# Patient Record
Sex: FEMALE | Race: WHITE | NOT HISPANIC OR LATINO | Employment: UNEMPLOYED | URBAN - METROPOLITAN AREA
[De-identification: names, ages, dates, MRNs, and addresses within clinical notes are randomized per-mention and may not be internally consistent; named-entity substitution may affect disease eponyms.]

---

## 2017-05-24 ENCOUNTER — HISTORICAL (OUTPATIENT)
Dept: ADMINISTRATIVE | Facility: HOSPITAL | Age: 77
End: 2017-05-24

## 2017-05-24 LAB
ABS NEUT (OLG): 5.69 X10(3)/MCL (ref 2.1–9.2)
ALBUMIN SERPL-MCNC: 4.6 GM/DL (ref 3.4–5)
ALBUMIN/GLOB SERPL: 1 RATIO (ref 1–2)
ALP SERPL-CCNC: 55 UNIT/L (ref 20–120)
ALT SERPL-CCNC: 21 UNIT/L
APPEARANCE, UA: CLEAR
AST SERPL-CCNC: 33 UNIT/L
BACTERIA #/AREA URNS AUTO: ABNORMAL /[HPF]
BASOPHILS # BLD AUTO: 0.06 X10(3)/MCL
BASOPHILS NFR BLD AUTO: 1 % (ref 0–1)
BILIRUB SERPL-MCNC: 0.8 MG/DL
BILIRUB UR QL STRIP: NEGATIVE
BILIRUBIN DIRECT+TOT PNL SERPL-MCNC: 0.2 MG/DL
BILIRUBIN DIRECT+TOT PNL SERPL-MCNC: 0.6 MG/DL
BUN SERPL-MCNC: 17 MG/DL (ref 7–25)
CALCIUM SERPL-MCNC: 10 MG/DL (ref 8.4–10.3)
CHLORIDE SERPL-SCNC: 102 MMOL/L (ref 96–110)
CHOLEST SERPL-MCNC: 232 MG/DL
CHOLEST/HDLC SERPL: 4.8 {RATIO} (ref 0–4.4)
CO2 SERPL-SCNC: 30 MMOL/L (ref 24–32)
COLOR UR: YELLOW
CREAT SERPL-MCNC: 0.72 MG/DL (ref 0.7–1.1)
EOSINOPHIL # BLD AUTO: 0.31 X10(3)/MCL
EOSINOPHIL NFR BLD AUTO: 3 % (ref 0–5)
ERYTHROCYTE [DISTWIDTH] IN BLOOD BY AUTOMATED COUNT: 12.8 % (ref 11.5–14.5)
GLOBULIN SER-MCNC: 3.3 GM/ML (ref 2.3–3.5)
GLUCOSE (UA): NORMAL
GLUCOSE SERPL-MCNC: 104 MG/DL (ref 65–99)
HCT VFR BLD AUTO: 45.3 % (ref 35–46)
HDLC SERPL-MCNC: 48 MG/DL
HGB BLD-MCNC: 15.4 GM/DL (ref 12–16)
HGB UR QL STRIP: NEGATIVE
HYALINE CASTS #/AREA URNS LPF: ABNORMAL /[LPF]
IMM GRANULOCYTES # BLD AUTO: 0.06 10*3/UL
IMM GRANULOCYTES NFR BLD AUTO: 1 %
KETONES UR QL STRIP: NEGATIVE
LDLC SERPL CALC-MCNC: 158 MG/DL (ref 0–130)
LEUKOCYTE ESTERASE UR QL STRIP: 250 LEU/UL
LYMPHOCYTES # BLD AUTO: 3.74 X10(3)/MCL
LYMPHOCYTES NFR BLD AUTO: 35 % (ref 15–40)
MCH RBC QN AUTO: 32.1 PG (ref 26–34)
MCHC RBC AUTO-ENTMCNC: 34 GM/DL (ref 31–37)
MCV RBC AUTO: 94.4 FL (ref 80–100)
MONOCYTES # BLD AUTO: 0.81 X10(3)/MCL
MONOCYTES NFR BLD AUTO: 8 % (ref 4–12)
NEUTROPHILS # BLD AUTO: 5.69 X10(3)/MCL
NEUTROPHILS NFR BLD AUTO: 53 X10(3)/MCL
NITRITE UR QL STRIP: NEGATIVE
PH UR STRIP: 6.5 [PH] (ref 4.5–8)
PLATELET # BLD AUTO: 238 X10(3)/MCL (ref 130–400)
PMV BLD AUTO: 12.5 FL (ref 7.4–10.4)
POTASSIUM SERPL-SCNC: 3.8 MMOL/L (ref 3.6–5.2)
PROT SERPL-MCNC: 7.9 GM/DL (ref 6–8)
PROT UR QL STRIP: 10 MG/DL
RBC # BLD AUTO: 4.8 X10(6)/MCL (ref 4–5.2)
RBC #/AREA URNS AUTO: ABNORMAL /[HPF]
SODIUM SERPL-SCNC: 139 MMOL/L (ref 135–146)
SP GR UR STRIP: 1.02 (ref 1–1.03)
SQUAMOUS #/AREA URNS LPF: ABNORMAL /[LPF]
TRIGL SERPL-MCNC: 131 MG/DL
UROBILINOGEN UR STRIP-ACNC: NORMAL
VLDLC SERPL CALC-MCNC: 26 MG/DL
WBC # SPEC AUTO: 10.7 X10(3)/MCL (ref 4.5–11)
WBC #/AREA URNS AUTO: ABNORMAL /HPF

## 2017-12-06 ENCOUNTER — HISTORICAL (OUTPATIENT)
Dept: ADMINISTRATIVE | Facility: HOSPITAL | Age: 77
End: 2017-12-06

## 2018-01-02 ENCOUNTER — HISTORICAL (OUTPATIENT)
Dept: ADMINISTRATIVE | Facility: HOSPITAL | Age: 78
End: 2018-01-02

## 2018-03-27 ENCOUNTER — HISTORICAL (OUTPATIENT)
Dept: ADMINISTRATIVE | Facility: HOSPITAL | Age: 78
End: 2018-03-27

## 2018-03-27 LAB
ALBUMIN SERPL-MCNC: 4.2 GM/DL (ref 3.4–5)
ALBUMIN/GLOB SERPL: 1 RATIO (ref 1–2)
ALP SERPL-CCNC: 53 UNIT/L (ref 45–117)
ALT SERPL-CCNC: 24 UNIT/L (ref 12–78)
AST SERPL-CCNC: 22 UNIT/L (ref 15–37)
BILIRUB SERPL-MCNC: 0.4 MG/DL (ref 0.2–1)
BILIRUBIN DIRECT+TOT PNL SERPL-MCNC: 0.1 MG/DL
BILIRUBIN DIRECT+TOT PNL SERPL-MCNC: 0.3 MG/DL
BUN SERPL-MCNC: 17 MG/DL (ref 7–18)
CALCIUM SERPL-MCNC: 10 MG/DL (ref 8.5–10.1)
CHLORIDE SERPL-SCNC: 107 MMOL/L (ref 98–107)
CHOLEST SERPL-MCNC: 203 MG/DL
CHOLEST/HDLC SERPL: 4.4 {RATIO} (ref 0–4.4)
CK SERPL-CCNC: 85 UNIT/L (ref 26–192)
CO2 SERPL-SCNC: 28 MMOL/L (ref 21–32)
CREAT SERPL-MCNC: 0.7 MG/DL (ref 0.6–1.3)
GLOBULIN SER-MCNC: 3.7 GM/ML (ref 2.3–3.5)
GLUCOSE SERPL-MCNC: 107 MG/DL (ref 74–106)
HDLC SERPL-MCNC: 46 MG/DL
LDLC SERPL CALC-MCNC: 117 MG/DL (ref 0–130)
POTASSIUM SERPL-SCNC: 4.2 MMOL/L (ref 3.5–5.1)
PROT SERPL-MCNC: 7.9 GM/DL (ref 6.4–8.2)
SODIUM SERPL-SCNC: 141 MMOL/L (ref 136–145)
TRIGL SERPL-MCNC: 198 MG/DL
VLDLC SERPL CALC-MCNC: 40 MG/DL

## 2018-09-25 ENCOUNTER — HISTORICAL (OUTPATIENT)
Dept: ADMINISTRATIVE | Facility: HOSPITAL | Age: 78
End: 2018-09-25

## 2018-09-25 LAB
ABS NEUT (OLG): 5.05 X10(3)/MCL (ref 2.1–9.2)
BASOPHILS # BLD AUTO: 0.05 X10(3)/MCL
BASOPHILS NFR BLD AUTO: 0 %
BUN SERPL-MCNC: 13 MG/DL (ref 7–18)
CALCIUM SERPL-MCNC: 10.4 MG/DL (ref 8.5–10.1)
CHLORIDE SERPL-SCNC: 102 MMOL/L (ref 98–107)
CO2 SERPL-SCNC: 29 MMOL/L (ref 21–32)
CREAT SERPL-MCNC: 0.7 MG/DL (ref 0.6–1.3)
CREAT/UREA NIT SERPL: 19
EOSINOPHIL # BLD AUTO: 0.31 X10(3)/MCL
EOSINOPHIL NFR BLD AUTO: 3 %
ERYTHROCYTE [DISTWIDTH] IN BLOOD BY AUTOMATED COUNT: 12.8 % (ref 11.5–14.5)
GLUCOSE SERPL-MCNC: 90 MG/DL (ref 74–106)
HCT VFR BLD AUTO: 42.1 % (ref 35–46)
HGB BLD-MCNC: 14.4 GM/DL (ref 12–16)
IMM GRANULOCYTES # BLD AUTO: 0.04 10*3/UL
IMM GRANULOCYTES NFR BLD AUTO: 0 %
LYMPHOCYTES # BLD AUTO: 3.73 X10(3)/MCL
LYMPHOCYTES NFR BLD AUTO: 38 % (ref 13–40)
MCH RBC QN AUTO: 32.9 PG (ref 26–34)
MCHC RBC AUTO-ENTMCNC: 34.2 GM/DL (ref 31–37)
MCV RBC AUTO: 96.1 FL (ref 80–100)
MONOCYTES # BLD AUTO: 0.73 X10(3)/MCL
MONOCYTES NFR BLD AUTO: 7 % (ref 4–12)
NEUTROPHILS # BLD AUTO: 5.05 X10(3)/MCL
NEUTROPHILS NFR BLD AUTO: 51 X10(3)/MCL
PLATELET # BLD AUTO: 180 X10(3)/MCL (ref 130–400)
PMV BLD AUTO: 13.2 FL (ref 7.4–10.4)
POTASSIUM SERPL-SCNC: 3.9 MMOL/L (ref 3.5–5.1)
RBC # BLD AUTO: 4.38 X10(6)/MCL (ref 4–5.2)
SODIUM SERPL-SCNC: 139 MMOL/L (ref 136–145)
WBC # SPEC AUTO: 9.9 X10(3)/MCL (ref 4.5–11)

## 2018-09-27 ENCOUNTER — HISTORICAL (OUTPATIENT)
Dept: SURGERY | Facility: HOSPITAL | Age: 78
End: 2018-09-27

## 2018-09-27 LAB — POTASSIUM SERPL-SCNC: 4.2 MMOL/L (ref 3.5–5.1)

## 2020-01-02 ENCOUNTER — HISTORICAL (OUTPATIENT)
Dept: INTERNAL MEDICINE | Facility: CLINIC | Age: 80
End: 2020-01-02

## 2020-01-02 LAB
ABS NEUT (OLG): 4.65 X10(3)/MCL (ref 2.1–9.2)
BASOPHILS # BLD AUTO: 0 X10(3)/MCL (ref 0–0.2)
BASOPHILS NFR BLD AUTO: 0 %
BUN SERPL-MCNC: 20 MG/DL (ref 7–18)
CALCIUM SERPL-MCNC: 9.5 MG/DL (ref 8.5–10.1)
CHLORIDE SERPL-SCNC: 109 MMOL/L (ref 98–107)
CHOLEST SERPL-MCNC: 169 MG/DL
CHOLEST/HDLC SERPL: 3.5 {RATIO} (ref 0–4.4)
CO2 SERPL-SCNC: 29 MMOL/L (ref 21–32)
CREAT SERPL-MCNC: 0.8 MG/DL (ref 0.6–1.3)
CREAT UR-MCNC: 180 MG/DL
CREAT/UREA NIT SERPL: 25
DEPRECATED CALCIDIOL+CALCIFEROL SERPL-MC: 59.28 NG/ML (ref 30–80)
EOSINOPHIL # BLD AUTO: 0.4 X10(3)/MCL (ref 0–0.9)
EOSINOPHIL NFR BLD AUTO: 4 %
ERYTHROCYTE [DISTWIDTH] IN BLOOD BY AUTOMATED COUNT: 13.1 % (ref 11.5–14.5)
EST. AVERAGE GLUCOSE BLD GHB EST-MCNC: 117 MG/DL
GLUCOSE SERPL-MCNC: 94 MG/DL (ref 74–106)
HBA1C MFR BLD: 5.7 % (ref 4.2–6.3)
HCT VFR BLD AUTO: 41.7 % (ref 35–46)
HDLC SERPL-MCNC: 48 MG/DL (ref 40–59)
HGB BLD-MCNC: 13.8 GM/DL (ref 12–16)
IMM GRANULOCYTES # BLD AUTO: 0.04 10*3/UL
IMM GRANULOCYTES NFR BLD AUTO: 0 %
LDLC SERPL CALC-MCNC: 94 MG/DL
LYMPHOCYTES # BLD AUTO: 2.6 X10(3)/MCL (ref 0.6–4.6)
LYMPHOCYTES NFR BLD AUTO: 31 %
MCH RBC QN AUTO: 32.9 PG (ref 26–34)
MCHC RBC AUTO-ENTMCNC: 33.1 GM/DL (ref 31–37)
MCV RBC AUTO: 99.5 FL (ref 80–100)
MICROALBUMIN UR-MCNC: 21.8 MG/L (ref 0–19)
MICROALBUMIN/CREAT RATIO PNL UR: 12.1 MCG/MG CR (ref 0–29)
MONOCYTES # BLD AUTO: 0.7 X10(3)/MCL (ref 0.1–1.3)
MONOCYTES NFR BLD AUTO: 8 %
NEUTROPHILS # BLD AUTO: 4.65 X10(3)/MCL (ref 2.1–9.2)
NEUTROPHILS NFR BLD AUTO: 55 %
PLATELET # BLD AUTO: 204 X10(3)/MCL (ref 130–400)
PMV BLD AUTO: 12.9 FL (ref 7.4–10.4)
POTASSIUM SERPL-SCNC: 3.8 MMOL/L (ref 3.5–5.1)
RBC # BLD AUTO: 4.19 X10(6)/MCL (ref 4–5.2)
SODIUM SERPL-SCNC: 139 MMOL/L (ref 136–145)
TRIGL SERPL-MCNC: 137 MG/DL
TSH SERPL-ACNC: 1.67 MIU/L (ref 0.36–3.74)
VLDLC SERPL CALC-MCNC: 27 MG/DL
WBC # SPEC AUTO: 8.4 X10(3)/MCL (ref 4.5–11)

## 2020-02-27 ENCOUNTER — HISTORICAL (OUTPATIENT)
Dept: RADIOLOGY | Facility: HOSPITAL | Age: 80
End: 2020-02-27

## 2021-03-04 ENCOUNTER — HISTORICAL (OUTPATIENT)
Dept: ADMINISTRATIVE | Facility: HOSPITAL | Age: 81
End: 2021-03-04

## 2021-03-04 ENCOUNTER — HISTORICAL (OUTPATIENT)
Dept: INTERNAL MEDICINE | Facility: CLINIC | Age: 81
End: 2021-03-04

## 2021-03-04 LAB
ABS NEUT (OLG): 5.07 X10(3)/MCL (ref 2.1–9.2)
ALBUMIN SERPL-MCNC: 4.2 GM/DL (ref 3.4–4.8)
ALBUMIN/GLOB SERPL: 1.4 RATIO (ref 1.1–2)
ALP SERPL-CCNC: 71 UNIT/L (ref 40–150)
ALT SERPL-CCNC: 19 UNIT/L (ref 0–55)
APPEARANCE, UA: CLEAR
AST SERPL-CCNC: 25 UNIT/L (ref 5–34)
BACTERIA #/AREA URNS AUTO: ABNORMAL /HPF
BASOPHILS # BLD AUTO: 0 X10(3)/MCL (ref 0–0.2)
BASOPHILS NFR BLD AUTO: 0 %
BILIRUB SERPL-MCNC: 0.4 MG/DL
BILIRUB UR QL STRIP: NEGATIVE
BILIRUBIN DIRECT+TOT PNL SERPL-MCNC: 0.2 MG/DL (ref 0–0.5)
BILIRUBIN DIRECT+TOT PNL SERPL-MCNC: 0.2 MG/DL (ref 0–0.8)
BUN SERPL-MCNC: 17.4 MG/DL (ref 9.8–20.1)
CALCIUM SERPL-MCNC: 10.7 MG/DL (ref 8.4–10.2)
CHLORIDE SERPL-SCNC: 101 MMOL/L (ref 98–107)
CHOLEST SERPL-MCNC: 200 MG/DL
CHOLEST/HDLC SERPL: 4 {RATIO} (ref 0–5)
CO2 SERPL-SCNC: 28 MMOL/L (ref 23–31)
COLOR UR: ABNORMAL
CREAT SERPL-MCNC: 0.78 MG/DL (ref 0.55–1.02)
DEPRECATED CALCIDIOL+CALCIFEROL SERPL-MC: 65 NG/ML (ref 30–80)
EOSINOPHIL # BLD AUTO: 0.3 X10(3)/MCL (ref 0–0.9)
EOSINOPHIL NFR BLD AUTO: 3 %
ERYTHROCYTE [DISTWIDTH] IN BLOOD BY AUTOMATED COUNT: 13 % (ref 11.5–14.5)
GLOBULIN SER-MCNC: 2.9 GM/DL (ref 2.4–3.5)
GLUCOSE (UA): NEGATIVE
GLUCOSE SERPL-MCNC: 93 MG/DL (ref 82–115)
HAV IGM SERPL QL IA: NONREACTIVE
HBV CORE IGM SERPL QL IA: NONREACTIVE
HBV SURFACE AG SERPL QL IA: NONREACTIVE
HCT VFR BLD AUTO: 39.6 % (ref 35–46)
HCV AB SERPL QL IA: NONREACTIVE
HDLC SERPL-MCNC: 51 MG/DL (ref 35–60)
HGB BLD-MCNC: 13.1 GM/DL (ref 12–16)
HGB UR QL STRIP: NEGATIVE
HIV 1+2 AB+HIV1 P24 AG SERPL QL IA: NONREACTIVE
HYALINE CASTS #/AREA URNS LPF: ABNORMAL /LPF
IMM GRANULOCYTES # BLD AUTO: 0.05 10*3/UL
IMM GRANULOCYTES NFR BLD AUTO: 0 %
KETONES UR QL STRIP: NEGATIVE
LDLC SERPL CALC-MCNC: 115 MG/DL (ref 50–140)
LEUKOCYTE ESTERASE UR QL STRIP: 75 LEU/UL
LYMPHOCYTES # BLD AUTO: 2.9 X10(3)/MCL (ref 0.6–4.6)
LYMPHOCYTES NFR BLD AUTO: 32 %
MCH RBC QN AUTO: 32.8 PG (ref 26–34)
MCHC RBC AUTO-ENTMCNC: 33.1 GM/DL (ref 31–37)
MCV RBC AUTO: 99.2 FL (ref 80–100)
MONOCYTES # BLD AUTO: 0.8 X10(3)/MCL (ref 0.1–1.3)
MONOCYTES NFR BLD AUTO: 9 %
NEUTROPHILS # BLD AUTO: 5.07 X10(3)/MCL (ref 2.1–9.2)
NEUTROPHILS NFR BLD AUTO: 55 %
NITRITE UR QL STRIP: NEGATIVE
PH UR STRIP: 6.5 [PH] (ref 4.5–8)
PLATELET # BLD AUTO: 202 X10(3)/MCL (ref 130–400)
PMV BLD AUTO: 13.4 FL (ref 7.4–10.4)
POTASSIUM SERPL-SCNC: 4.4 MMOL/L (ref 3.5–5.1)
PROT SERPL-MCNC: 7.1 GM/DL (ref 5.8–7.6)
PROT UR QL STRIP: NEGATIVE
RBC # BLD AUTO: 3.99 X10(6)/MCL (ref 4–5.2)
RBC #/AREA URNS AUTO: ABNORMAL /HPF
SODIUM SERPL-SCNC: 143 MMOL/L (ref 136–145)
SP GR UR STRIP: 1.01 (ref 1–1.03)
SQUAMOUS #/AREA URNS LPF: ABNORMAL /LPF
TRIGL SERPL-MCNC: 169 MG/DL (ref 37–140)
UROBILINOGEN UR STRIP-ACNC: NORMAL
VLDLC SERPL CALC-MCNC: 34 MG/DL
WBC # SPEC AUTO: 9.2 X10(3)/MCL (ref 4.5–11)
WBC #/AREA URNS AUTO: ABNORMAL /HPF

## 2021-07-22 ENCOUNTER — HISTORICAL (OUTPATIENT)
Dept: ADMINISTRATIVE | Facility: HOSPITAL | Age: 81
End: 2021-07-22

## 2021-07-22 LAB
ALBUMIN SERPL-MCNC: 4.6 GM/DL (ref 3.4–4.8)
ALBUMIN/GLOB SERPL: 1.5 RATIO (ref 1.1–2)
ALP SERPL-CCNC: 65 UNIT/L (ref 40–150)
ALT SERPL-CCNC: 18 UNIT/L (ref 0–55)
ANTINUCLEAR ANTIBODY SCREEN (OHS): NEGATIVE
AST SERPL-CCNC: 25 UNIT/L (ref 5–34)
BILIRUB SERPL-MCNC: 0.8 MG/DL
BILIRUBIN DIRECT+TOT PNL SERPL-MCNC: 0.3 MG/DL (ref 0–0.5)
BILIRUBIN DIRECT+TOT PNL SERPL-MCNC: 0.5 MG/DL (ref 0–0.8)
BUN SERPL-MCNC: 19.3 MG/DL (ref 9.8–20.1)
CALCIUM SERPL-MCNC: 11.6 MG/DL (ref 8.4–10.2)
CHLORIDE SERPL-SCNC: 103 MMOL/L (ref 98–107)
CO2 SERPL-SCNC: 28 MMOL/L (ref 23–31)
CREAT SERPL-MCNC: 0.81 MG/DL (ref 0.55–1.02)
CRP SERPL-MCNC: 0.36 MG/DL
DEPRECATED CALCIDIOL+CALCIFEROL SERPL-MC: 61.8 NG/ML (ref 30–80)
DSDNA ANTIBODY (OHS): NEGATIVE
ERYTHROCYTE [SEDIMENTATION RATE] IN BLOOD: 13 MM/HR (ref 0–20)
EST CREAT CLEARANCE SER (OHS): 47.39 ML/MIN
EST. AVERAGE GLUCOSE BLD GHB EST-MCNC: 108.3 MG/DL
FOLATE SERPL-MCNC: 18.6 NG/ML (ref 7–31.4)
GLOBULIN SER-MCNC: 3 GM/DL (ref 2.4–3.5)
GLUCOSE SERPL-MCNC: 97 MG/DL (ref 82–115)
HBA1C MFR BLD: 5.4 %
POTASSIUM SERPL-SCNC: 3.9 MMOL/L (ref 3.5–5.1)
PROT SERPL-MCNC: 7.6 GM/DL (ref 5.8–7.6)
PTH-INTACT SERPL-MCNC: 131.8 PG/ML (ref 8.7–77)
SODIUM SERPL-SCNC: 140 MMOL/L (ref 136–145)
T PALLIDUM AB SER QL: NONREACTIVE
VIT B12 SERPL-MCNC: 914 PG/ML (ref 213–816)

## 2021-08-16 ENCOUNTER — HISTORICAL (OUTPATIENT)
Dept: RADIOLOGY | Facility: HOSPITAL | Age: 81
End: 2021-08-16

## 2021-10-20 ENCOUNTER — HISTORICAL (OUTPATIENT)
Dept: INTERNAL MEDICINE | Facility: CLINIC | Age: 81
End: 2021-10-20

## 2021-10-20 LAB
BUN SERPL-MCNC: 14 MG/DL (ref 9.8–20.1)
CALCIUM SERPL-MCNC: 10.4 MG/DL (ref 8.4–10.2)
CHLORIDE SERPL-SCNC: 111 MMOL/L (ref 98–107)
CO2 SERPL-SCNC: 22 MMOL/L (ref 23–31)
CREAT SERPL-MCNC: 0.74 MG/DL (ref 0.55–1.02)
CREAT UR-MCNC: 188.8 MG/DL (ref 45–106)
CREAT/UREA NIT SERPL: 19
DEPRECATED CALCIDIOL+CALCIFEROL SERPL-MC: 58.4 NG/ML (ref 30–80)
GLUCOSE SERPL-MCNC: 98 MG/DL (ref 82–115)
MICROALBUMIN UR-MCNC: 22.5 MG/L
MICROALBUMIN/CREAT RATIO PNL UR: 11.9 MG/GM CR (ref 0–30)
POTASSIUM SERPL-SCNC: 3.9 MMOL/L (ref 3.5–5.1)
PTH-INTACT SERPL-MCNC: 107.4 PG/ML (ref 8.7–77)
SODIUM SERPL-SCNC: 143 MMOL/L (ref 136–145)
T3FREE SERPL-MCNC: 2.67 PG/ML (ref 1.71–3.71)
T4 FREE SERPL-MCNC: 0.92 NG/DL (ref 0.7–1.48)
TSH SERPL-ACNC: 0.72 UIU/ML (ref 0.35–4.94)

## 2022-02-24 ENCOUNTER — HISTORICAL (OUTPATIENT)
Dept: RADIOLOGY | Facility: HOSPITAL | Age: 82
End: 2022-02-24

## 2022-02-24 ENCOUNTER — HISTORICAL (OUTPATIENT)
Dept: ADMINISTRATIVE | Facility: HOSPITAL | Age: 82
End: 2022-02-24

## 2022-04-10 ENCOUNTER — HISTORICAL (OUTPATIENT)
Dept: ADMINISTRATIVE | Facility: HOSPITAL | Age: 82
End: 2022-04-10
Payer: MEDICARE

## 2022-04-26 VITALS
BODY MASS INDEX: 21.71 KG/M2 | DIASTOLIC BLOOD PRESSURE: 76 MMHG | WEIGHT: 127.19 LBS | SYSTOLIC BLOOD PRESSURE: 125 MMHG | HEIGHT: 64 IN

## 2022-04-30 NOTE — PROGRESS NOTES
Patient:   Guillermina Stewart             MRN: [id number]            FIN: [id number]               Age:   81 years     Sex:  Female     :  1940   Associated Diagnoses:   None   Author:   Cruz Almendarez MD      Reviewed Ms. Stewart's lab results and thyroid ultrasound. Informed her of the TIRAD-5 US results and the need for follow up ultrasound. Also informed her of her high calcium and hyperparathyroidism and the need to f/u with Endocrinology. She said she doesn't need followup for her calcium, PTH, or thyroid nodules. I am concerned that she doesn't have decision-making capacity based on my interaction with her during the last visit. She seemed confused last visit and was not oriented to the year. I'm concerned that she could have Alzeihmer's. After explaining the risks of not addressing the hyperparathyroidism, I asked her if her calcium would get worse or better if we ignored her high PTH. She said it would get better because she's feeling better.     Will need to more fully assess her decision-making capacity at next office visit in 1 month. May need to update her contact information to have her daughter's phone number as primary contact.    Calcium is chronic and moderate; asymptomatic, so doesn't need urgent attention. She's already on bisphosphonate.     Cruz Almendarez PGY 2

## 2022-04-30 NOTE — H&P
* Final Report *  Document Contains Addenda  Ophthalmic Examination Visit *     Patient:   Guillermina Stewart             MRN: [id number]            FIN: [id number]               Age:   78 years     Sex:  Female     :  1940   Associated Diagnoses:   None   Author:   Ralf Leonard MD      Chief Complaint   2018 14:16 CDT      RTC for pupil exam, no eye pain        Health Status   Allergies:    Allergic Reactions (Selected)  Severity Not Documented  Darvon- No reactions were documented.  TraMADol- Vomiting.,    Allergies (2) Active Reaction  Darvon None Documented  traMADol Vomiting     Current medications:  (Selected)   Prescriptions  Prescribed  Celebrate Multivitamin oral capsule: 1 cap(s), Oral, Daily, # 30 cap(s), 3 Refill(s), Pharmacy: [facility]  Celexa 10 mg oral tablet: 10 mg = 1 tab(s), Oral, Daily, # 30 tab(s), 3 Refill(s), Pharmacy: [facility]  Cod Liver Oil oral capsule: 1 cap(s), Oral, Daily, # 30 cap(s), 3 Refill(s), Pharmacy: [facility]  Pravachol 40 mg oral tablet: 40 mg = 1 tab(s), Oral, Daily, # 30 tab(s), 3 Refill(s), Pharmacy: [facility]  aspirin 81 mg oral tablet: 81 mg = 1 tab(s), Oral, Daily, # 30 tab(s), 3 Refill(s), Pharmacy: [facility]  denosumab 60 mg/mL subcutaneous solution: 60 mg = 1 mL, Subcutaneous, q6mo, # 1 mL, 1 Refill(s), Pharmacy: [facility]  hydrochlorothiazide-lisinopril 25 mg-20 mg oral tablet: 1 tab(s), Oral, Daily, # 90 tab(s), 3 Refill(s), Pharmacy: [facility]  syringes and needles: syringes and needles, See Instructions, prescribe 3 month supply of syringes and needles, # 1 EA, 2 Refill(s), Pharmacy: [facility]  Documented Medications  Documented  Centrum Silver oral tablet: 1 tab(s), Oral, Daily, # 30 tab(s), 0 Refill(s)  Vitamin D3 2000 intl units oral capsule: 2,000 IntUnit = 1 cap(s), Oral, BID, 0 Refill(s),     Home Medications (10) Active  aspirin 81 mg oral tablet 81 mg = 1 tab(s), Oral, Daily  Celebrate Multivitamin oral capsule 1 cap(s), Oral, Daily  Celexa 10 mg oral tablet 10 mg = 1 tab(s), Oral, Daily  Centrum Silver oral tablet 1 tab(s), Oral, Daily  Cod Liver Oil oral capsule 1 cap(s), Oral, Daily  denosumab 60 mg/mL subcutaneous solution 60 mg = 1 mL, Subcutaneous, q6mo  hydrochlorothiazide-lisinopril 25 mg-20 mg oral tablet 1 tab(s), Oral, Daily  Pravachol 40 mg oral tablet 40 mg = 1 tab(s), Oral, Daily  syringes and needles See Instructions  Vitamin D3 2000 intl units oral capsule 2,000 IntUnit = 1 cap(s), Oral, BID     Problem list:    All Problems  HTN (hypertension) / SNOMED CT [id number] / Confirmed  Hyperlipidemia / SNOMED CT 40471166 / Confirmed  Tobacco user(  Probable Diagnosis  ) / SNOMED CT 657910869 / Confirmed,    Active Problems (3)  HTN (hypertension)   Hyperlipidemia   Tobacco user(  Probable Diagnosis  )         Histories   Past Medical History:    No active or resolved past medical history items have been selected or recorded.   Family History:    Anxiety.  Father (Tiago Langford, )     Procedure history:    Appendectomy (226760890).  Hysterectomy (529690596).  Hemorrhoidectomy (50902587).   Social History        Social & Psychosocial Habits    Alcohol  2015 Risk Assessment: Denies Alcohol Use    2016  Use: Never    Employment/School  2016  Status: Retired    Exercise  2016  Times per week: 1-2 times/week    Exercise type: Walking    Home/Environment  2015  Lives with: Alone    Living situation: Home/Independent    Alcohol abuse in household: No    Substance abuse in household: No    Smoker in household: Yes    Injuries/Abuse/Neglect in household: No    Feels unsafe at home: No    Family/Friends available to help: Yes    Concern for family members at home: No    Major illness in household: No    Financial concerns: No     Concerns over TV/Computer/Game use: No    Comment: has 3 children - 09/03/2015 12:13 - Elmo BERG, Zheng Tan    Nutrition/Health  09/03/2015  Diet description: regular    Type of diet: Regular    Caffeine intake amount: coffee    Wants to lose weight: No    Sleeping concerns: No    Feels highly stressed: No    Substance Abuse  03/05/2015 Risk Assessment: Denies Substance Abuse    06/22/2016  Use: Never    Tobacco  09/19/2018  Use: Current every day smoker  .        Physical Examination   Measurements from flowsheet : Measurements   9/19/2018 14:16 CDT      Weight Dosing             59.3 kg                             Weight Measured           59.3 kg                             Weight Measured and Calculated in Lbs     130.73 lb                             Height/Length Dosing      165 cm                             Height/Length Measured    165 cm                             BSA Measured              1.65 m2                             Body Mass Index Measured  21.78 kg/m2        Health Maintenance      Health Maintenance     Pending (in the next year)        Due            Bone Density Screening due  07/20/18  and every 2  year(s)           ADL Screening due  09/19/18  and every 1  year(s)           Advance Directive due  09/19/18  and every 1  year(s)           Cognitive Screening due  09/19/18  and every 1  year(s)           Functional Assessment due  09/19/18  and every 1  year(s)        Refused            Pneumococcal Vaccine due  09/19/18  Variable frequency           Tetanus Vaccine due  09/19/18  and every 10  year(s)           Zoster Vaccine due  09/19/18  and every 100  year(s)        Due In Future            Hypertension Management-BMP not due until  03/27/19  and every 1  year(s)           Aspirin Therapy for CVD Prevention not due until  03/27/19  and every 1  year(s)           Hypertension Management-Blood Pressure not due until  07/19/19  and every 1  year(s)           Geriatric Depression  Screening not due until  07/19/19  and every 1  year(s)           Smoking Cessation not due until  09/10/19  and every 1  year(s)     Satisfied (in the past 1 year)        Satisfied            Aspirin Therapy for CVD Prevention on  03/27/18.  Satisfied by Honey Lau MD           Blood Pressure Screening on  07/19/18.  Satisfied by Cedric Garcia RN           Body Mass Index Check on  09/19/18.  Satisfied by Kitty Ulloa LPN           Depression Screening on  07/19/18.  Satisfied by Cedric Garcia RN           Diabetes Screening on  03/27/18.  Satisfied by Dede Feliz           Fall Risk Assessment on  09/19/18.  Satisfied by Kitty Ulloa LPN           Geriatric Depression Screening on  07/19/18.  Satisfied by Cedric Garcia RN           Hypertension Management-BMP on  03/27/18.  Satisfied by Dede Feliz           Hypertension Management-Blood Pressure on  07/19/18.  Satisfied by Cedric Garcia RN           Influenza Vaccine on  09/19/18.  Satisfied by Kitty Ulloa LPN           Lipid Screening on  03/27/18.  Satisfied by Dede Feliz           Obesity Screening on  09/19/18.  Satisfied by Kitty Ulloa LPN           Smoking Cessation on  09/10/18.  Satisfied by Cristina Gutierres LPN           Smoking Cessation (Coronary Artery Disease) on  09/10/18.  Satisfied by Cristina Gutierres LPN        Refused            Influenza Vaccine on  11/08/17.  Recorded for Honey Lau MD  Reason: Patient Refuses           Pneumococcal Vaccine on  07/19/18.  Recorded for Honey Lau MD  Reason: Patient Refuses           Tetanus Vaccine on  07/19/18.  Recorded for Honey Lau MD  Reason: Patient Refuses           Zoster Vaccine on  07/19/18.  Recorded for Honey Lau MD  Reason: Patient Refuses          Impression and Plan   VA cc  OD: CF PH 20/200  OS: 20/40 PH 20/25  Pupil: PERRL no APD OU    Mrx  -2.50 +1.00 x 118 20/100+1  +1.00 + 0.50 x 037  20/30    Tp: 11//11    CVF: Deferred  EOM: full OU, ortho  Pupils: Dilated OU    Slit lamp examination:   Lids/lash/Adnexae: wnl OU  Conjunctiva/sclera: W&Q OU  Cornea: clear OU  Anterior chamber: D&Q OU  Iris: R/R OU  Lens: 3+ NSC OU 1+ CC 1+ PSC // 2+ NSC     DFE: 9/18  Vitreous: wnl OU  Optic disc: CDR 0.2 // 0.3, P/S/H OU  Macula: flat OU  Vessels: WNL OU  Periphery: flat 360 OU; no H/T/D      Testing    OCT Mac: 266 // 281, wnl OU    OCT RNFL: 78//83 all green    K camden:   1.38 D irreg astig at 122  0.68 D irreg astig at 020    Assessment and Plan:    1) Visually significant NSC, OD>OS  - BCVA 20/100 // 20/30 (unable to perform BAT today)  - Risks/benefits/alternatives of surgery discussed with patient; Discussed option of waiting for cataract to worsen prior to surgery.  - denies any trauma   -denies any flomax use   -on aspirin, but no other blood thinners   - does not need trypan  -dilates well   -paperwork done today  -plan for iSert 17.5D for final refraction of -0.27   -plan CE/IOL OD on 9/27/18  -RTC POD #1 9/28/18    RTC POD #1 9/28/18        Addendum by José Miguel Steward MD on September 19, 2018 17:19 CDT (Verified)  Patient seen/discussed  agree with all findings and plan as above    Result type: Ophthalmology Office/Clinic Note  Result date: September 19, 2018 16:36 CDT  Result status: Modified  Result title: Ophthalmic Examination Visit *  Performed by: Ralf Leonard MD on September 19, 2018 16:57 CDT  Verified by: Ralf Leonard MD on September 19, 2018 16:57 CDT  Encounter info: [id number], [facility], Clinic Visit, 9/19/2018 - 9/19/2018    Doc has been moved by HIM specialist.

## 2022-04-30 NOTE — OP NOTE
* Final Report *  Operative Note [facility]     Patient:   Guillermina Stewart             MRN: [id number]            FIN: [id number]               Age:   78 years     Sex:  Female     :  1940   Associated Diagnoses:   None   Author:   Ralf Leonard MD      Operative Note   DATE OF PROCEDURE: 18       PREOPERATIVE DIAGNOSIS: Visually significant cataract of right eye       POSTOPERATIVE DIAGNOSIS: Same       PROCEDURE PERFORMED: Cataract extraction with phacoemulsification and posterior chamber intraocular lens placement right eye       SURGEON: Ralf Leonard MD       STAFF: Arnaud Barroso MD       COMPLICATIONS: None.       BLOOD LOSS: Less than 5 mL.       ANESTHESIA: Monitored anesthesia care.       IMPLANT: iSert 17.5D       PROCEDURE IN DETAIL: After informed consent including the risks, benefits and alternatives, the patient was brought to the Operating Room table and placed in a supine position. Monitored anesthesia care was used throughout the entire procedure without any complications. Once adequate anesthesia was achieved with topical tetracaine, the patient was then prepped and draped in usual sterile fashion for intraocular surgery. A sideport blade was used to make a paracentesis wound. Viscoat was used to fill the anterior chamber. A 2.4 mm keratome blade was then used to make a clear corneal cataract wound.  Cystotome was used to make a tear in the anterior capsular flap, which was continued around with Utrata forceps for continuous curvilinear capsulorrhexis. BSS was then used for hydrodissection and hydrodelineation of lens. The lens was noted to spin freely in the bag. Lens was then removed in a divide and conquer manner with the phacoemulsification handpiece. Irrigation and aspiration handpiece was then used to remove the remaining cortical material. Provisc was then used to fill the capsular bag and the lens as mentioned above was placed in the bag without any complications. Irrigation  aspiration handpiece removed the remaining Provisc and the wounds were hydrated with BSS.The eye was noted to have a good physiological pressure. The lid speculum was removed under microscope without any shallowing. Topical Vigamox and pred fortewere placed in the eye. The eye was then covered with a shield. The patient tolerated the procedure well without any complication. The patient will follow-up with ophthalmology the next day.        Result type: Progress Note-Physician  Result date: September 27, 2018 13:12 CDT  Result status: Auth (Verified)  Result title: Operative Note [facility]  Performed by: Ralf Leonard MD on September 27, 2018 13:15 CDT  Verified by: Ralf Leonard MD on September 27, 2018 13:15 CDT  Encounter info: [id number], [facility], Day Surgery, 9/27/2018 - 9/27/2018

## 2022-04-30 NOTE — H&P
Patient:   Guillermina Stewart             MRN: [id number]            FIN: [id number]               Age:   78 years     Sex:  Female     :  1940   Associated Diagnoses:   None   Author:   Vitor Beverly MD      HPI: PT here for CEIOL OD. Denies changes in health or medications since the patient was last seen in clinic.     ROS: Negative x10    General NAP  HENT atraumatic  Eyes: Cataract OD  Neck: nontender, no masses or thyromegaly  Resp: no distress on room air  Cardio: regular rate  Gastro: no hepatomegaly  Lymph: no lymphadenopathy  MSK: WNL    SLE:Slit Lamp Exam:  L/L/A: wnl OD.  C/S: white and quiet ou  K: clear ou  AC: d/q ou  I: r/r ou,   L: Cataract OD  Ant vit: wnl OU  A/P  1. Visually significant cataract OD  -Pt complains of decreased vision and significant glare OD  -Calcs obatined previously, review calc before selecting lens  -After extensive discussion of RBA, informed consent was signed in clinic and reviewed today  -Plan for CEIOL OD today

## 2022-05-02 NOTE — HISTORICAL [facility] CERNER
This is a historical note converted from Maranda. Formatting and pictures may have been removed.  Please reference Maranda for original formatting and attached multimedia. Chief Complaint  FOLLOW UP AND REFILL OF MEDICATIONS  History of Present Illness  ?Miss Stewart is a 75 yo WF is seen for follow-up visit. ?Patient has a history of osteoporosis. DEXA scan showed severe osteoporosis with maximum T score of -3.4. She was prescribed Prolia in 7/27/16.? She got her 2nd injection in January 2017. ?Her 3rd injection is due in July 2017. Reports improvement in her sciatica. ?States she stopped taking her gabapentin and adjusting multivitamin that it has worked very well for her. States she measures her blood pressure at home and her grandson keeps her blood pressure log. ?However she did not bring her blood pressure log?with her to clinic today. ?Reports compliance with her?blood pressure medication. ?Denies chest pain,?shortness of breath,?palpitations, syncope, claudication,?fever, chills,?bowel or bladder symptoms.? Patient still smokes.? She states she smokes less than a pack?a day. ?Has been smoking since she was 30 years old.? She is not willing to quit.  Review of Systems  Constitutional:?No fever, no chills, no sweats  HEENT:?No blurry vision, no sore throat,?no ear pain, no throat pain  CVS:?No chest pain,?no palpitations,?no ?syncope  RESP:?No cough, no shortness of breath  GI:?No diarrhea, no constipation  :?No dysuria, no hematuria  Musculoskeletal:?no muscle pain, no joint pain  Integumentary:?No rash, no itching  Neuro:?No headaches,?no ?seizures  Psychiatry:?No anxiety, depression  Physical Exam  Vitals & Measurements  T:?36.6? ?C ?(Oral)? HR:?68?(Peripheral)? RR:?20? BP:?143/70? HT:?165?cm? HT:?165?cm? WT:?59.5?kg? WT:?59.5?kg? BMI:?21.85?  Gen:?Alert oriented ?4,?no acute distress  HEENT:?PERRLA, EOMI,?moist oral mucosa,?no pharyngeal erythema,?no carotid bruit, no JVD, no lymphadenopathy  CVS:?Regular  rate, rhythm, no murmur, no peripheral?edema  RESP:?Chest clear to auscultation bilaterally ,?nonlabored respirations  Abdomen:?Soft, nontender, nondistended  Musculoskeletal:?normal range of motion?of the back?and all the limbs,?no deformity  Integumentary:?No rash  Neuro:?Alert oriented ?4,?normal sensory, normal motor function, no focal deficits, cranial nerves II through XII intact,?normal deep tendon reflexes  psychiatry:?Cooperative, appropriate mood and affect  Assessment/Plan  HTN (hypertension)  Ordered:  CBC w/ Auto Diff, Routine collect, 05/24/17 11:18:00 CDT, Blood, Stop date 05/24/17 11:18:00 CDT, Lab Collect, Osteoporosis  HTN (hypertension)  Left sided sciatica, 05/24/17 11:18:00 CDT  Clinic Follow up, *Est. 09/24/17 3:00:00 CDT, Order for future visit, Osteoporosis  HTN (hypertension)  Left sided sciatica, [facility]  Comprehensive Metabolic Panel, Routine collect, 05/24/17 11:18:00 CDT, Blood, Stop date 05/24/17 11:18:00 CDT, Lab Collect, Osteoporosis  HTN (hypertension)  Left sided sciatica, 05/24/17 11:18:00 CDT  Lipid Panel, Routine collect, 05/24/17 11:18:00 CDT, Blood, Stop date 05/24/17 11:18:00 CDT, Lab Collect, Osteoporosis  HTN (hypertension)  Left sided sciatica, 05/24/17 11:18:00 CDT  Urinalysis with Microscopic if Indicated, Routine collect, Urine, 05/24/17 11:18:00 CDT, Stop date 05/24/17 11:18:00 CDT, Nurse collect, Osteoporosis  HTN (hypertension)  Left sided sciatica, 05/24/17 11:18:00 CDT  ?  Orders:  denosumab, 60 mg = 1 mL, Subcutaneous, q6mo, # 1 mL, 1 Refill(s)  hydroCHLOROthiazide-lisinopril, 1 tab(s), Oral, Daily, # 90 tab(s), 3 Refill(s)  pravastatin, 20 mg = 1 tab(s), Oral, Daily, # 30 tab(s), 3 Refill(s)  1. Osteoporosis  -likely secondary to smoking history, age and history of hysterectomy  -first injection of Prolia on 7/27/16,?second injection on 1/27/17 , third injection due?on 7/27/17, gave prescription refill to the patient today  -on Caltrate bid  ?   2.  Left sided sciatica- improved  -able to ambulate well with cane  -stopped taking gabapentin  -states it is well controlled with her multivitamins  ?   3. HTN  -advised to bring her BP log on next visit  -low salt diet  -prescription refill for HCTZ-lisinopril was given to the patient  ?   4. Tobacco use  -advised on cessation  -not willing to quit smoking  ?   5. Hyperlipidemia  - continue pravastatin, prescription refill given to patient  -ordered lipid panel for today  ?  6. Actinic keratoses  -patient not willing to have lesions excised  ?  7. Prevention  -patient does not need MMG, advised on self breast exam  -patient refused pneumococcal vaccine  -patient refused zoster vaccine  -recommend annual flu vaccine, patient refused  -patient has >30 pack year smoking history and is currently smoking, will need imaging for lung cancer screening (no weight loss, cough, hemoptysis, family history of lung cancer)  -takes aspirin daily  -does not need any GYN screening unless she develops any symptoms?like vaginal bleeding or discharge  ?  Patient will go to the lab today for CBC, CMP, lipid panel, UA  Follow up in 4 months  ?   Problem List/Past Medical History  HTN (hypertension)  Hyperlipidemia  Tobacco user(  Probable Diagnosis  )  Historical  No historical problems  Procedure/Surgical History  Appendectomy, Hemorrhoidectomy, Hysterectomy.  Medications  aspirin 81 mg oral tablet, 81 mg, 1 tab(s), Oral, Daily  Caltrate 600 + D oral tablet, 1 tab(s), Oral, BID, 3 refills  Celebrate Multivitamin, Daily  Cod Liver Oil oral capsule, 2 cap(s), Oral, Daily  denosumab 60 mg/mL subcutaneous solution, 60 mg, 1 mL, Subcutaneous, q6mo, 1 refills  hydrochlorothiazide-lisinopril 25 mg-20 mg oral tablet, 1 tab(s), Oral, Daily, 3 refills  Pravastatin 20 mg Oral Tab, 20 mg, 1 tab(s), Oral, Daily, 3 refills  syringes and needles, See Instructions, 2 refills  Allergies  Darvon  traMADol?(Vomiting)  Social History  Alcohol - Denies  Alcohol Use  Never  Employment/School  Retired  Exercise  Exercise frequency: 1-2 times/week. Exercise type: Walking.  Home/Environment  Lives with Alone. Living situation: Home/Independent. Alcohol abuse in household: No. Substance abuse in household: No. Smoker in household: Yes. Injuries/Abuse/Neglect in household: No. Feels unsafe at home: No. Family/Friends available for support: Yes. Concern for family members at home: No. Major illness in household: No. Financial concerns: No. TV/Computer concerns: No.  Nutrition/Health  Type of diet: regular. Regular, Caffeine intake amount: coffee. Wants to lose weight: No. Sleeping concerns: No. Feels highly stressed: No.  Substance Abuse - Denies Substance Abuse  Never  Tobacco  Current every day smoker, Cigarettes  Family History  Anxiety.: Father.      Follow up visit for clinic today. H&P reviewed as above as well as available labs/imaging test results.  Elderly pt doing well overall. Reports compliance with medical rx. On Prolia for severe Osteoporosis.  Still smoking and not ready to quit. Does have AK lesions on skin exam.  Discussed management plans with resident at time of clinic visit.

## 2022-05-02 NOTE — HISTORICAL [facility] CERNER
This is a historical note converted from [employer]. Formatting and pictures may have been removed.  Please reference Cerceline for original formatting and attached multimedia. Chief Complaint  follow up with medication refills  History of Present Illness  Ms. Gauthieris a 80-year-old female with past medical history of osteoporosis, tobacco use, hypertension, depression presented to medicine clinic for a follow-up visit.?Last clinic visit was on 3/2021. At that time she had no complaints, but her daughter was concerned about confusion which the patient denied. She was cutting back on smoking from 1/2 ppd to 5 cigarettes per day.  ?   Today, the patient has no complaints. The patients other daughter is with her today. Says shes noticed intermittent confusion, sometimes forgetting relatives names, forgetting where she puts things in the house.?No gait problems, visual/auditory hallucinations, tremors.  ?  Review of Systems  Constitutional: no fever/chills  ENMT: no nasal congestion/drainage  Respiratory: no cough or shortness of breath  Cardiovascular: no chest pain, no palpitations, no edema  Gastrointestinal: no nausea, vomiting, or diarrhea, no constipation. No abdominal pain  Genitourinary: no dysuria, no urinary frequency or urgency  Hema/Lymph: no abnormal bruising  Integumentary: no skin rash  Musculoskeletal: no muscle or joint pain, no joint swelling  Neurologic: no weakness, numbness  ?  Physical Exam  Vitals & Measurements  T:?36.7? ?C (Oral)? HR:?77(Peripheral)? RR:?16? BP:?131/79?  HT:?162.00?cm? WT:?59.600?kg? BMI:?22.71?  General - Appears comfortable, appropriatley conversive  Mental Status - doesnt remember month and day of her birthday. Doesnt remember the current year. Alert to location and name.  HEENT - No pre/post-auricular, anterior/posterior cervical, or supraclavicular?lymph nodes appreciated; no rhinorrhea  Cardiac - RRR, no murmurs, rubs, or gallops; no edema in LE  Respiratory - breathing  comfortably; clear to ascultation bilaterally  Abdominal - nondistended,?soft, nontender to palpation  Extremities - LE, UE, and joints are nonerythematous and nonswollen  Skin - no rashes or bruises seen on skin  Assessment/Plan  1. Osteoporosis  -likely secondary to smoking history, age and history of hysterectomy  -Prolia injections 7/2016-->1/2017-->7/2017-->1/2018-->7/2018-->1/2019-->12/2019-->5/2020?-->3/2021-->next one for August  -on?OTC vit D supplements, 1000 units daily?cholecalciferol,?and calcium supplements  -lowered 2000-->1000 IU Vitamin D previously  -repeat DEXA scan 2/2020 showed bone mineral density of the lumbar vertebrae in osteopenic range and?of the femurs consistent with osteoporosis; improved from DEXA in 2016  -ordering vitamin D level  ?   Hypercalcemia  -ordering PTH  -if she has hyperparathyroidism, it would be indication for surgery and would refer to endocrinology  ?   Prediabetes  -ordering A1C  ?   2. Left sided sciatica- improved  -able to ambulate well with cane  -stopped taking gabapentin  ?   3. Ischiogluteal bursitis  - taking Tylenol with relief  ?   4. HTN  -advised keep BP log  -low salt diet  -cont with HCTZ-lisinopril  ?   5. Tobacco use  -trying to quit smoking, 5/2020-at 5 cigarettes/day  -not addressed this visit  ?   6. Hyperlipidemia  -continue pravastatin 40mg  -no need to check lipid panel today  ?  7. Psoriasis,?scaly R upper arm/shoulder lesion (possibly seborrheic keratosis vs Actinic keratoses)  -doesnt want to be referred to derm/minor procedure clinic, will monitor  -topical calcitriol didnt help; rash isnt bothering patient  ?  8. Difficulty hearing  -s/p cerumen disimpaction , audiology revealed mild impairment at very high frequencies  -improved  ?  9. Depression-resolved  -has not had depression since about 2015  -denies suicidal and homicidal ideation  -gets angry easily  -discontinued Celexa in past visit per patient request  ?  Forgetfulness  -concern  for early Alzeihmers  -spoke with two different daughters who both raised concern over forgetfullness of relatives names and of where she puts things around her house  -patient not oriented to the month and day of her birthday or to the current year  -no visual/auditory hallucinations, tremors, or gait disturbances  -does have mood swings per daughter  -starting donepezil 5mg daily; will increase dose to 10mg daily at next visit  -ordering B12, Folate, Syphilis oracio, LATOYA, RF, ESR, CRP  -patient refusing MRI Brain  ?  10. Health maintenance  -refuses vaccines; due for tdap, pneumovax, shingrix  -doesnt need routine cancer screenings given age (mammogram, pap smear, low-dose CT). Will discuss with patient next visit to see if she desires screenings or not.  -patient has >30 pack year smoking history  ?  ?  Other patient information  -lives in [place]. Her  passed away over 15 years ago. Grandson passed away 6 years ago. Used to arm-wrestle and likes to watch wrestling on TV. 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 Is stable on her feet.  ?  Patient seen and examined,?reviewed with the resident,?agree with?assessment?and?plan.  ?  Cruz Arshad PGY 2  ?  f/u?3 months  Referrals  Clinic Follow up, *Est. 10/22/21 3:00:00 CDT, Order for future visit, HTN (hypertension)  Osteoporosis  Hyperlipidemia  Forgetfulness  Hypercalcemia, [facility]   Problem List/Past Medical History  Ongoing  Forgetfulness  HTN (hypertension)  Hypercalcemia  Hyperlipidemia  Osteoporosis  Tobacco user(  Probable Diagnosis  )  Historical  No qualifying data  Procedure/Surgical History  Cataract Extraction Phacoemulsification (Right) (09/27/2018)  Extracapsular cataract removal with insertion of intraocular lens prosthesis (1 stage procedure), manual or mechanical technique (eg, irrigation and aspiration or phacoemulsification) (09/27/2018)  IOL Placement (None) (09/27/2018)  Replacement  of Right Lens with Synthetic Substitute, Percutaneous Approach (09/27/2018)  Appendectomy  Hemorrhoidectomy  Hysterectomy   Medications  aspirin 81 mg oral tablet, 81 mg= 1 tab(s), Oral, Daily, 6 refills  calcitriol 3 mcg/g topical ointment, 1 wang, TOP, BID  Centrum Silver oral tablet, 1 tab(s), Oral, Daily  denosumab 60 mg/mL subcutaneous solution, 60 mg= 1 mL, Subcutaneous, q6mo, 5 refills,? ?Not taking  donepezil 5 mg oral tablet, 5 mg= 1 tab(s), Oral, Once a day (at bedtime), 5 refills  hydrochlorothiazide-lisinopril 25 mg-20 mg oral tablet, See Instructions  pravastatin 40 mg oral tablet, See Instructions, 3 refills  syringes and needles, See Instructions, 2 refills  Allergies  Darvon  traMADol?(Vomiting)  Social History  Abuse/Neglect  No, No, Yes, 05/28/2020  Alcohol - Denies Alcohol Use, 03/05/2015  Never, 05/28/2020  Employment/School  Retired, 01/08/2020  Exercise  Exercise duration: 60. Exercise frequency: Daily. Exercise type: Walking., 01/08/2020  Home/Environment  Lives with Children. Living situation: Home/Independent. TV/Computer concerns: No. Single family house, 01/08/2020  Nutrition/Health  Regular, Good, 01/08/2020  Sexual  Gender Identity Identifies as female., 04/22/2019  Substance Use - Denies Substance Abuse, 03/05/2015  Never, 05/28/2020  Tobacco  5-9 cigarettes (between 1/4 to 1/2 pack)/day in last 30 days, Cigarettes, No, 07/22/2021  5-9 cigarettes (between 1/4 to 1/2 pack)/day in last 30 days, Cigarettes, No, 5 per day. 40 year(s). Household tobacco concerns: No., 05/28/2020  Family History  Anxiety.: Father.  Health Maintenance  Health Maintenance  ???Pending?(in the next year)  ??? ??OverDue  ??? ? ? ?Smoking Cessation due??01/01/21??and every 1??year(s)  ??? ??Due?  ??? ? ? ?Depression Screening due??05/28/21??and every 1??year(s)  ??? ? ? ?Medicare Annual Wellness Exam due??07/22/21??and every 1??year(s)  ??? ? ? ?Pneumococcal Vaccine due??07/22/21??Unknown Frequency  ??? ? ? ?Zoster  Vaccine due??07/22/21??Unknown Frequency  ??? ??Refused?  ??? ? ? ?Tetanus Vaccine due??07/22/21??and every 10??year(s)  ??? ??Due In Future?  ??? ? ? ?Obesity Screening not due until??01/01/22??and every 1??year(s)  ??? ? ? ?Advance Directive not due until??01/02/22??and every 1??year(s)  ??? ? ? ?Cognitive Screening not due until??01/02/22??and every 1??year(s)  ??? ? ? ?Fall Risk Assessment not due until??01/02/22??and every 1??year(s)  ??? ? ? ?Functional Assessment not due until??01/02/22??and every 1??year(s)  ??? ? ? ?Diabetes Screening not due until??03/04/22??and every 1??year(s)  ??? ? ? ?Hypertension Management-BMP not due until??03/04/22??and every 1??year(s)  ???Satisfied?(in the past 1 year)  ??? ??Satisfied?  ??? ? ? ?ADL Screening on??07/22/21.??Satisfied by Kade Costello LPN  ??? ? ? ?Advance Directive on??03/04/21.??Satisfied by Kade Costello LPN  ??? ? ? ?Blood Pressure Screening on??07/22/21.??Satisfied by Kade Costello LPN  ??? ? ? ?Body Mass Index Check on??07/22/21.??Satisfied by Kade Costello LPN  ??? ? ? ?Cognitive Screening on??03/04/21.??Satisfied by Kade Costello LPN  ??? ? ? ?Diabetes Screening on??03/04/21.??Satisfied by Cris Gilliam  ??? ? ? ?Fall Risk Assessment on??07/22/21.??Satisfied by Kade Costello LPN  ??? ? ? ?Functional Assessment on??03/04/21.??Satisfied by Kade Costello LPN  ??? ? ? ?Hypertension Management-Blood Pressure on??07/22/21.??Satisfied by Kade Costello LPN  ??? ? ? ?Influenza Vaccine on??03/04/21.??Satisfied by Kade Costello LPN  ??? ? ? ?Lipid Screening on??03/04/21.??Satisfied by Cris Gilliam  ??? ? ? ?Obesity Screening on??07/22/21.??Satisfied by Kade Costello LPN  ??? ??Refused?  ??? ? ? ?Influenza Vaccine on??03/04/21.??Recorded by Kade Costello LPN??Reason: Patient Refuses  ??? ? ? ?Pneumococcal Vaccine on??07/22/21.??Recorded by Kade Costello LPN??Reason: Patient Refuses  ??? ? ? ?Tetanus Vaccine on??07/22/21.??Recorded by  Kade Costello LPN??Reason: Patient Refuses  ??? ? ? ?Zoster Vaccine on??07/22/21.??Recorded by Kade Costello LPN??Reason: Patient Refuses  ?

## 2022-05-02 NOTE — HISTORICAL [facility] CERNER
This is a historical note converted from [employer]. Formatting and pictures may have been removed.  Please reference Cerceline for original formatting and attached multimedia. History of Present Illness  Prior History:  ?   Patient is a 80-year-old female with past medical history of osteoporosis, tobacco use, hypertension, depression presented to medicine clinic for a follow-up visit.?Last clinic visit was on 05/2020. At that time she had no complaints. She was cutting back on smoking from 1/2 ppd to 5 cigarettes per day.  ?   6/2020 Recently went to ED for dizziness; diagnosed with orthostatic hypotension and cystitis based on UA; told to drink more water at home and given Macrobid course.  ?  ?  ?   Interval History  ?   Ms. Kaye daughter?had called the week prior to appointment to ask for a urinalysis since patient was acting confused. Upon further questioning today, daughter says patient seems to have chronic forgetfulness. Denies getting lost, disorientation, losing things. Denies dysuria or urgency; endorses frequency. Patient gets angry easily but has not been having any depression for over 6 years since grandson passed away. No appetite changes, no trouble sleeping, no trouble concentrating. She doesnt remember if she took her Prolia injection last May. Has rash on left shoulder, itches sometimes, does not really bother patient. Walks steadily, is very stable on feet  ?  Review of Systems  Constitutional: no fever/chills  ENMT: no nasal congestion/drainage  Respiratory: no cough or shortness of breath  Cardiovascular: no chest pain, no palpitations, no edema  Gastrointestinal: no nausea, vomiting, or diarrhea, no consitpation. No abdominal pain  Genitourinary: no dysuria, no urinary frequency or urgency  Hema/Lymph: no abnormal bruising  Integumentary: has skin rash on shoulder  Musculoskeletal: no muscle or joint pain, no joint swelling  Neurologic: no weakness, numbness  ?  Physical Exam  Vitals &  Measurements  T:?36.8? ?C (Oral)? HR:?87(Peripheral)? RR:?18? BP:?113/76?  HT:?162.00?cm? WT:?56.900?kg? BMI:?21.68?  General - Appears comfortable, appropriatley conversive  Mental Status - alert and oriented x 3, speaking in logical, relevant sentences  HEENT - No pre/post-auricular, anterior/posterior cervical, or supraclavicular?lymph nodes appreciated; no rhinorrhea  Cardiac - RRR, no murmurs, rubs, or gallops; no edema in LE  Respiratory - breathing comfortably; clear to ascultation bilaterally  Abdominal - nondistended,?soft, nontender to palpation  Extremities - LE, UE, and joints are nonerythematous and nonswollen  Skin - erythematous plaque on left shoulder about [id number]  Assessment/Plan  1. Osteoporosis  -likely secondary to smoking history, age and history of hysterectomy  -Prolia injections 7/2016-->1/2017-->7/2017-->1/2018-->7/2018-->1/2019-->12/2019-->5/2020?-->3/2021  -on?OTC vit D supplements, 2000 units daily?cholecalciferol,?and calcium supplements  -5/2020-lowered vitamin D to 2000 U  -repeat DEXA scan 2/2020 showed bone mineral density of the lumbar vertebrae in osteopenic range and?of the femurs consistent with osteoporosis; improved from DEXA in 2016  -ordering vitamin D level today  -refilling prolia today  ?   2. Left sided sciatica- improved  -able to ambulate well with cane  -stopped taking gabapentin  ?   3. Ischiogluteal bursitis  - taking Tylenol with relief  ?   4. HTN  -advised keep BP log  -low salt diet  -cont with HCTZ-lisinopril  ?   5. Tobacco use  -trying to quit smoking, 5/2020-at 5 cigarettes/day  -not adressed this visit  ?   6. Hyperlipidemia  -continue pravastatin 40mg  -no need to check lipid panel today  -ordering lipid panel today  ?  7. Psoriasis,?scaly R upper arm/shoulder lesion (possibly seborrheic keratosis vs Actinic keratoses)  -doesnt want to be referred to derm/minor procedure clinic, will monitor  -prescribing calcitriol course to see if it helps. Looks like  psoriasis.  ?  8. Difficulty hearing  -s/p cerumen disimpaction , audiology revealed mild impairment at very high frequencies  -improved  ?  9. Depression-resolved  -has not had depression since about 2015  -denies suicidal and homicidal ideation  -gets angry easily  -per patient request, will discontinue Celexa  ?  10. Health maintenance  -patient does not need MMG  -patient refused pneumococcal vaccine  -patient refused zoster vaccine  -patient has >30 pack year smoking history and is currently smoking, refusing lung cancer screening with low dose CT  -does not need any GYN screening unless she develops any symptoms?like vaginal bleeding or discharge  -refusing all vaccines  -will order CBC, CMP,?HIV, Hep C  ?  Other patient information  -lives in [place]. Her  passed away over 15 years ago. Grandson passed away 6 years ago. Used to arm-wrestle and likes to watch wrestling on TV. 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  ?  Cruz Arshad PGY 1  ?  f/u 4 months   Problem List/Past Medical History  Ongoing  HTN (hypertension)  Hyperlipidemia  Osteoporosis  Tobacco user(  Probable Diagnosis  )  Historical  No qualifying data  Procedure/Surgical History  Cataract Extraction Phacoemulsification (Right) (09/27/2018)  Extracapsular cataract removal with insertion of intraocular lens prosthesis (1 stage procedure), manual or mechanical technique (eg, irrigation and aspiration or phacoemulsification) (09/27/2018)  IOL Placement (None) (09/27/2018)  Replacement of Right Lens with Synthetic Substitute, Percutaneous Approach (09/27/2018)  Appendectomy  Hemorrhoidectomy  Hysterectomy   Medications  aspirin 81 mg oral tablet, 81 mg= 1 tab(s), Oral, Daily, 6 refills  Centrum Silver oral tablet, 1 tab(s), Oral, Daily  citalopram 10 mg oral tablet, See Instructions, 6 refills  clindamycin 300 mg oral capsule, 300 mg= 1 cap(s), Oral, TID  Cod Liver Oil oral capsule, 1 cap(s), Oral, Daily, 3  refills  denosumab 60 mg/mL subcutaneous solution, 60 mg= 1 mL, Subcutaneous, q6mo, 5 refills  fluconazole 150 mg oral tablet, 150 mg= 1 tab(s), Oral, Once  hydrochlorothiazide-lisinopril 25 mg-20 mg oral tablet, 1 tab(s), Oral, Daily, 6 refills  Macrobid 100 mg oral capsule, 100 mg= 1 cap(s), Oral, BID  mupirocin 2% topical ointment, 1 wang, TOP, TID  pravastatin 40 mg oral tablet, See Instructions, 2 refills  sulfamethoxazole-trimethoprim 800 mg-160 mg oral tablet, 1 tab(s), Oral, BID  syringes and needles, See Instructions, 2 refills  Vitamin D3 2000 intl units (50 mcg) oral capsule, 2000 IntUnit= 1 cap(s), Oral, Daily, 4 refills  Allergies  Darvon  traMADol?(Vomiting)  Social History  Abuse/Neglect  No, 06/17/2020  No, No, Yes, 05/28/2020  No, 02/21/2020  Alcohol - Denies Alcohol Use, 03/05/2015  Never, 05/28/2020  Employment/School  Retired, 01/08/2020  Exercise  Exercise duration: 60. Exercise frequency: Daily. Exercise type: Walking., 01/08/2020  Home/Environment  Lives with Children. Living situation: Home/Independent. TV/Computer concerns: No. Single family house, 01/08/2020  Nutrition/Health  Regular, Good, 01/08/2020  Sexual  Gender Identity Identifies as female., 04/22/2019  Substance Use - Denies Substance Abuse, 03/05/2015  Never, 05/28/2020  Tobacco  5-9 cigarettes (between 1/4 to 1/2 pack)/day in last 30 days, No, 06/17/2020  5-9 cigarettes (between 1/4 to 1/2 pack)/day in last 30 days, Cigarettes, No, 5 per day. 40 year(s). Household tobacco concerns: No., 05/28/2020  Family History  Anxiety.: Father.  Health Maintenance  Health Maintenance  ???Pending?(in the next year)  ??? ??OverDue  ??? ? ? ?Influenza Vaccine due??10/01/20??and every 1??day(s)  ??? ? ? ?Obesity Screening due??01/01/21??and every 1??year(s)  ??? ? ? ?Smoking Cessation due??01/01/21??and every 1??year(s)  ??? ? ? ?Advance Directive due??01/02/21??and every 1??year(s)  ??? ? ? ?Cognitive Screening due??01/02/21??and every  1??year(s)  ??? ? ? ?Fall Risk Assessment due??01/02/21??and every 1??year(s)  ??? ? ? ?Functional Assessment due??01/02/21??and every 1??year(s)  ??? ??Due?  ??? ? ? ?Medicare Annual Wellness Exam due??03/04/21??and every 1??year(s)  ??? ? ? ?Pneumococcal Vaccine due??03/04/21??Unknown Frequency  ??? ? ? ?Zoster Vaccine due??03/04/21??Unknown Frequency  ??? ??Refused?  ??? ? ? ?Tetanus Vaccine due??03/04/21??and every 10??year(s)  ??? ??Due In Future?  ??? ? ? ?Blood Pressure Screening not due until??05/28/21??and every 1??year(s)  ??? ? ? ?Body Mass Index Check not due until??05/28/21??and every 1??year(s)  ??? ? ? ?Hypertension Management-Blood Pressure not due until??05/28/21??and every 1??year(s)  ??? ? ? ?ADL Screening not due until??05/28/21??and every 1??year(s)  ??? ? ? ?Diabetes Screening not due until??06/17/21??and every 1??year(s)  ??? ? ? ?Hypertension Management-BMP not due until??06/17/21??and every 1??year(s)  ???Satisfied?(in the past 1 year)  ??? ??Satisfied?  ??? ? ? ?ADL Screening on??05/28/20.??Satisfied by Ayesha Luna RN  ??? ? ? ?Blood Pressure Screening on??06/17/20.??Satisfied by Sunitha Dean  ??? ? ? ?Body Mass Index Check on??06/17/20.??Satisfied by Jayla Guillen RN  ??? ? ? ?Depression Screening on??05/28/20.??Satisfied by Ayesha Luna RN  ??? ? ? ?Diabetes Screening on??06/17/20.??Satisfied by Cas Smith Jr.  ??? ? ? ?Fall Risk Assessment on??05/28/20.??Satisfied by Ayesha Luna RN  ??? ? ? ?Functional Assessment on??05/28/20.??Satisfied by Ayesha Luna RN  ??? ? ? ?Hypertension Management-Blood Pressure on??06/17/20.??Satisfied by Sunitha Dean  ??? ? ? ?Obesity Screening on??06/17/20.??Satisfied by Jayla Guillen RN  ??? ? ? ?Smoking Cessation on??05/28/20.??Satisfied by Tami PASTOR, Mark MAYER??Reason: Expectation Satisfied Elsewhere  ?      I have reviewed the patients history, residents ?findings on physical  examination, diagnosis and treatment plan. Care provided was reasonable and necessary.  Restart probably up,?had been off since?May 2020  Hepatitis C screening, vitamin D, CMP, lipid panel?due  Also due for low-dose CT for lung cancer screening  Referred to Saturday Medicare wellness clinic   Called patient. Spoke with her daughter and informed her UA and urine culture did not show any infection. Patient has not had any problems. Daughter mentions, as she did last visit, concern about short-term memory. Will continue to monitor this clinically. Also given persistent leukocyte esterase with negative cultures, will consider doing urine chlamydia test next visit

## 2022-05-02 NOTE — HISTORICAL [facility] CERNER
This is a historical note converted from Cerceline. Formatting and pictures may have been removed.  Please reference Maranda for original formatting and attached multimedia. Chief Complaint  4 MONTH RTC  History of Present Illness  Miss Stewart is a 76 yo WF is seen for follow-up visit. ?Patient has a history of osteoporosis. DEXA scan showed severe osteoporosis with maximum T score of -3.4. She was prescribed Prolia in 7/27/16.? She got her 2nd injection in January 2017. ?Her 4th injection was in Jan 2018. Reports improvement in her sciatica.???Reports compliance with her?blood pressure medication. ?Denies chest pain,?shortness of breath,?palpitations, syncope, claudication,?fever, chills,?bowel or bladder symptoms.? Patient still smokes.? She states she smokes less than a pack?a day. ?Has been smoking since she was 30 years old.? She is not willing to quit. Pt was referred to ENT/audiology for hearing difficulty on last visit. Pt had cerumen disimpaction and audiometry done. Pt states her hearing is much improved now. Reports some muscle aches after her statin dose was increased on her last visit. No other complains.  Review of Systems  Constitutional:?No fever, no chills, no sweats  HEENT:?No blurry vision, no sore throat,?no ear pain, no throat pain  CVS:?No chest pain,?no palpitations,?no ?syncope  RESP:?No cough, no shortness of breath  GI:?No diarrhea, no constipation  :?No dysuria, no hematuria  Musculoskeletal:?no muscle pain, no joint pain  Integumentary:?No rash, no itching  Neuro:?No headaches,?no ?seizures  Psychiatry:?No anxiety, depression  Physical Exam  Vitals & Measurements  T:?37.0? ?C (Oral)? HR:?79(Peripheral)? RR:?18? BP:?135/62?  HT:?165?cm? HT:?165?cm? WT:?61.2?kg? WT:?61.2?kg? BMI:?22.48?  Gen:?Alert oriented ?4,?no acute distress  HEENT:?PERRLA, EOMI,?moist oral mucosa,?no pharyngeal erythema,?no carotid bruit, no JVD, no lymphadenopathy  CVS:?Regular rate, rhythm, no murmur, no  peripheral?edema  RESP:?Chest clear to auscultation bilaterally ,?nonlabored respirations  Abdomen:?Soft, nontender, nondistended  Musculoskeletal:?normal range of motion?of the back?and all the limbs,?no deformity  Integumentary:?No rash, actinic keratosis lesions on the face  Neuro:?Alert oriented ?4,?normal sensory, normal motor function, no focal deficits, cranial nerves II through XII intact,?normal deep tendon reflexes  psychiatry:?Cooperative, appropriate mood and affect  Assessment/Plan  Osteoporosis  Ordered:  Clinic Follow up, *Est. 07/27/18 3:00:00 CDT, Order for future visit, Osteoporosis  HLD (hyperlipidemia), [facility]  Comprehensive Metabolic Panel, Routine collect, 03/27/18 14:07:00 CDT, Blood, Order for future visit, Stop date 03/27/18 14:07:00 CDT, Lab Collect, Osteoporosis  HLD (hyperlipidemia), 03/27/18 14:07:00 CDT  Creatine Kinase, Routine collect, 03/27/18 14:08:00 CDT, Blood, Order for future visit, Stop date 03/27/18 14:08:00 CDT, Lab Collect, Osteoporosis  HLD (hyperlipidemia), 03/27/18 14:08:00 CDT  Lipid Panel, Routine collect, 03/27/18 14:08:00 CDT, Blood, Order for future visit, Stop date 03/27/18 14:08:00 CDT, Lab Collect, Osteoporosis  HLD (hyperlipidemia), 03/27/18 14:08:00 CDT  ?  Orders:  aspirin, 81 mg = 1 tab(s), Oral, Daily, # 30 tab(s), 3 Refill(s), Pharmacy: [facility]  denosumab, 60 mg = 1 mL, Subcutaneous, q6mo, # 1 mL, 1 Refill(s), Pharmacy: [facility]  hydrochlorothiazide-lisinopril, 1 tab(s), Oral, Daily, # 90 tab(s), 3 Refill(s), Pharmacy: [facility] Prescription, syringes and needles, See Instructions, prescribe 3 month supply of syringes and needles, # 1 EA, 2 Refill(s), Pharmacy: [facility]  pravastatin, 40 mg = 1 tab(s), Oral, Daily, # 30 tab(s), 3 Refill(s), Pharmacy: [facility]  ?  1. Osteoporosis  -likely secondary to smoking history, age and history of  hysterectomy  -first injection of Prolia on 7/27/16,?second injection on 1/27/17 , third injection??on 7/27/17, 4th injection January 2018, next injection due in July 2018  -on Caltrate bid  ?   2. Left sided sciatica- improved  -able to ambulate well with cane  -stopped taking gabapentin  -states it is well controlled with her multivitamins  ?   3. HTN  -advised to bring her BP log on next visit  -low salt diet  -cont with HCTZ-lisinopril  ?   4. Tobacco use  -advised on cessation  -not willing to quit smoking  ?   5. Hyperlipidemia  - cont pravastatin 40mg  --5/2017  -repeat lipid panel today along with CK  ?   6. Actinic keratoses  -patient not willing to have lesions excised  ?   7. Difficulty hearing  ???? -s/p cerumen disimpaction , audiology revealed mild impairment at very high frequencies  ???? -improved  ?   8. Health maintainence  -patient does not need MMG, advised on self breast exam  -patient refused pneumococcal vaccine  -patient refused zoster vaccine  -recommend annual flu vaccine, patient refused  -patient has >30 pack year smoking history and is currently smoking, will need imaging for lung cancer screening (no weight loss, cough, hemoptysis, family history of lung cancer)  -takes aspirin daily  -does not need any GYN screening unless she develops any symptoms?like vaginal bleeding or discharge  -patient refused flu shot  ?  ?  Follow up in 4 months   Problem List/Past Medical History  Ongoing  HTN (hypertension)  Hyperlipidemia  Tobacco user(  Probable Diagnosis  )  Historical  No qualifying data  Procedure/Surgical History  Appendectomy, Hemorrhoidectomy, Hysterectomy.  Medications  aspirin 81 mg oral tablet, 81 mg= 1 tab(s), Oral, Daily, 3 refills  Caltrate 600 + D oral tablet, 1 tab(s), Oral, BID, 3 refills  Celebrate Multivitamin oral capsule, 1 cap(s), Oral, Daily, 3 refills  Cod Liver Oil oral capsule, 1 cap(s), Oral, Daily, 3 refills  denosumab 60 mg/mL subcutaneous solution, 60  mg= 1 mL, Subcutaneous, q6mo, 1 refills  hydrochlorothiazide-lisinopril 25 mg-20 mg oral tablet, 1 tab(s), Oral, Daily, 3 refills  Pravachol 40 mg oral tablet, 40 mg= 1 tab(s), Oral, Daily, 3 refills  syringes and needles, See Instructions, 2 refills  Allergies  Darvon  traMADol?(Vomiting)  Social History  Alcohol - Denies Alcohol Use, 03/05/2015  Never, 06/22/2016  Employment/School  Retired, 06/22/2016  Exercise  Self assessment: Fair condition., 11/08/2017  Exercise frequency: 1-2 times/week. Exercise type: Walking., 08/03/2016  Home/Environment  Lives with Alone. Living situation: Home/Independent. Alcohol abuse in household: No. Substance abuse in household: No. Smoker in household: Yes. Injuries/Abuse/Neglect in household: No. Feels unsafe at home: No. Family/Friends available for support: Yes. Concern for family members at home: No. Major illness in household: No. Financial concerns: No. TV/Computer concerns: No., 09/03/2015  Nutrition/Health  Type of diet: regular. Regular, Caffeine intake amount: coffee. Wants to lose weight: No. Sleeping concerns: No. Feels highly stressed: No., 09/03/2015  Substance Abuse - Denies Substance Abuse, 03/05/2015  Never, 06/22/2016  Tobacco  Current every day smoker, Cigarettes, 20 per day., 03/27/2018  Family History  Anxiety.: Father.      PT d/w MEDICINE resident-agree with above assessment and plan-care provided today is appropriate

## 2022-05-14 NOTE — PROGRESS NOTES
Patient:   Guillermina Stewart            MRN: [id number]            FIN: [id number]               Age:   81 years     Sex:  Female     :  1940   Associated Diagnoses:   None   Author:   Tanesha PASTOR, Cruz STINSON      Ordering Urine Ca and Cr to investigate into FHH vs primary hyperparathyroidism. If has primary hyperparathyroidism, then will refer to ENT for parathyroidectomy. She also has apnt with Endocrinology in 2022    Cruz Almendarez PGY 2

## 2022-06-02 ENCOUNTER — OFFICE VISIT (OUTPATIENT)
Dept: FAMILY MEDICINE | Facility: CLINIC | Age: 82
End: 2022-06-02
Payer: MEDICARE

## 2022-06-02 VITALS
SYSTOLIC BLOOD PRESSURE: 118 MMHG | WEIGHT: 130.5 LBS | DIASTOLIC BLOOD PRESSURE: 79 MMHG | HEIGHT: 62 IN | OXYGEN SATURATION: 96 % | TEMPERATURE: 98 F | RESPIRATION RATE: 20 BRPM | BODY MASS INDEX: 24.01 KG/M2 | HEART RATE: 70 BPM

## 2022-06-02 DIAGNOSIS — F02.80 ALZHEIMER DISEASE: Primary | ICD-10-CM

## 2022-06-02 DIAGNOSIS — G30.9 ALZHEIMER DISEASE: Primary | ICD-10-CM

## 2022-06-02 PROBLEM — I10 HYPERTENSION: Status: ACTIVE | Noted: 2022-06-02

## 2022-06-02 PROBLEM — Z72.0 TOBACCO USER: Status: ACTIVE | Noted: 2022-06-02

## 2022-06-02 PROBLEM — R68.89 FORGETFULNESS: Status: ACTIVE | Noted: 2022-06-02

## 2022-06-02 PROBLEM — E78.5 HYPERLIPIDEMIA: Status: ACTIVE | Noted: 2022-06-02

## 2022-06-02 PROBLEM — E83.52 HYPERCALCEMIA: Status: ACTIVE | Noted: 2022-06-02

## 2022-06-02 PROBLEM — M81.0 OSTEOPOROSIS: Status: ACTIVE | Noted: 2022-06-02

## 2022-06-02 PROCEDURE — 99213 OFFICE O/P EST LOW 20 MIN: CPT | Mod: PBBFAC

## 2022-06-02 RX ORDER — DONEPEZIL HYDROCHLORIDE 10 MG/1
10 TABLET, FILM COATED ORAL NIGHTLY
COMMUNITY
Start: 2022-04-01 | End: 2022-10-13 | Stop reason: SDUPTHER

## 2022-06-02 RX ORDER — DENOSUMAB 60 MG/ML
INJECTION SUBCUTANEOUS
COMMUNITY
Start: 2022-02-03 | End: 2023-05-31 | Stop reason: SDUPTHER

## 2022-06-02 RX ORDER — AMLODIPINE BESYLATE 5 MG/1
5 TABLET ORAL DAILY
COMMUNITY
Start: 2022-04-01 | End: 2022-10-13

## 2022-06-02 RX ORDER — LISINOPRIL 20 MG/1
20 TABLET ORAL DAILY
COMMUNITY
Start: 2022-04-01 | End: 2022-10-13 | Stop reason: SDUPTHER

## 2022-06-02 RX ORDER — PRAVASTATIN SODIUM 40 MG/1
40 TABLET ORAL DAILY
COMMUNITY
Start: 2022-04-01 | End: 2023-01-20 | Stop reason: SDUPTHER

## 2022-06-02 NOTE — PROGRESS NOTES
Subjective:       Patient ID: Guillermina Stewart is a 81 y.o. female.    Chief Complaint: Memory Loss and Hypertension    HPI   82yo female PMH osteoporosis, HTN, depression, hyperparathyroidism, hypercalcemia, HLD, memory deficit, tobacco use. Presents to the [facility] for initial visit after referral from PCP Tanesha in [facility]. Per Dr Almendarez's documentation, over last few visits there was concern for developing Alzeihmer's dementia. She was started on donepezil 7/22/21 which was increased 10mg qD. Daughter, Patricia, can be reached by phone number [phone]    Memory deficit:   - two different daughters raised concern over forgetfullness of relatives' names, location of things around her house; pt does not feel she has memory issues but does admit to forgetting where she places things   - patient not oriented to birthdate or current year at last PCP visit , nor does she now   - pt and daughter denied visual/auditory hallucinations, tremors, or gait disturbances at last PCP visit not today   - reported mood swings by daughter that are worsening and has worsened since last visit   -  passes away 15y ago; lives in [place] with her son  -  B12 elevated at 914, Folate WNL, Syphilis/HIV/Hep panel negative, LATOYA negative, RF IgA 1 IgG 3 IgM 0 , ESR 13 WNL, CRP 0.336 WNL, all 7/2021  - no side effects of donepezil 10mg qD    US thyroid 8/2021:   IMPRESSION  - Multiple bilateral thyroid nodules, none of which meet biopsy criteria secondary to size below threshold. Annual surveillance thyroid ultrasound recommended for the subcentimeter TI-RADS 5 nodules.  - Hypoechoic posterior right nodule may be extrathyroidal and could represent parathyroid gland, otherwise relatively indistinct and limited characterization. Additional assessment with nuclear medicine imaging and/or parathyroid 4D protocol CT recommended  - pt refused further work-up of of thyroid and parathyroidism; PCP doubts her decision-making  capacity considering memory and mood issues. Has not followed up with Endocrinology regarding her hyperparathyroidism; per daughter visit is rescheduled in 2022      MRI Brain 2022:   - no restricted diffusion, hemorrhage or edema moderate scattered and patchy T2/FLAIR hyperintensities in the subcortical and periventricular white matter, with prior lacunar infarcts in the basal ganglia, thalami and leland.  - no mass effect or midline shift  - moderate parenchymal volume loss   - basal cisterns are patent  - no hydrocephalus or abnormal extra-axial fluid collection  - major intracranial flow voids are patent  - fluid layering in the left maxillary sinus.  IMPRESSION:  1.  No acute intracranial abnormality identified.  2.  Moderate chronic microvascular ischemic changes and parenchymal  volume loss.    SLUMS , pos screen for dementia  Education level: 8th grade    Nidhi assessment:  No recent falls; 2m ago slipped on wet cemetn, did not go to ER   Appetite is good  Sleep is good; daughter reports 3w ago she was outside at 3am with an unlit cigarette and was unsure of what she was doing, another time wa getting a pen for   11m ago  Bowel/bladder normal and has no complaints  ADLs/iADLS - able to dress herself, needs assistance with bathing, independent of transferring, cleaning - son cooks but she is able to do so for herself, daughter manages finances but always has   No driving issues, no wrecks or tickets  ambulates without issue  Vision is good  memory similar to previous (daughter present for entire exam), christie  lives with sonJacob      Medications:   Centrum MV   Amlodipine 5mg qD   Prolia q6m   Donepezil 10mg   Lisinopril 20mg qD  Pravastatin 40mg qD     Review of Systems    Denies HA, dizziness, vision changes, edema, chest pain/congestion, palpitations, GI/ issues  Objective:      Physical Exam    Blood pressure 118/79, pulse 70, temperature 98.4 °F (36.9 °C), temperature source Oral,  "resp. rate 20, height 5' 1.81" (1.57 m), weight 59.2 kg (130 lb 8.2 oz), SpO2 96 %.    General: no acute distress, accompanied by daughter   CVS: RRR, no murmurs. No carotid bruits Radial pulses 2+ BL , no edema  Resp: intermittent, inspiratory wheezes at all lung areas  GI: normoactive BS in all 4 quadrants, nonTTP  Psych: appropriate and cooperative    MME:   President - does know   Serial 2s - cannot   W-O-R-L-D - doesn't read well   5 word recall - 0/5    Assessment:       1. Forgetfulness        Plan:     -  B12 elevated at 914, Folate WNL, Syphilis/HIV/Hep panel negative, LATOYA negative, RF IgA 1 IgG 3 IgM 0 , ESR 13 WNL, CRP 0.336 WNL, all 7/2021  - MRI brain, US thyroid  as above in HPI  - consider Nemenda XR 7; pt and family will like to discuss, PCP may Rx    RTC on GAC 6m FU     HILDA Stewart MD HOII   [employer]  resident     "

## 2022-06-08 ENCOUNTER — APPOINTMENT (OUTPATIENT)
Dept: LAB | Facility: HOSPITAL | Age: 82
End: 2022-06-08
Attending: STUDENT IN AN ORGANIZED HEALTH CARE EDUCATION/TRAINING PROGRAM
Payer: MEDICARE

## 2022-06-08 DIAGNOSIS — E83.52 HYPERCALCEMIA: Primary | ICD-10-CM

## 2022-06-08 LAB — CALCIUM UR-MCNC: 16.5 MG/DL

## 2022-06-08 PROCEDURE — 82340 ASSAY OF CALCIUM IN URINE: CPT

## 2022-06-23 ENCOUNTER — OFFICE VISIT (OUTPATIENT)
Dept: INTERNAL MEDICINE | Facility: CLINIC | Age: 82
End: 2022-06-23
Payer: MEDICARE

## 2022-06-23 ENCOUNTER — LAB VISIT (OUTPATIENT)
Dept: LAB | Facility: HOSPITAL | Age: 82
End: 2022-06-23
Attending: STUDENT IN AN ORGANIZED HEALTH CARE EDUCATION/TRAINING PROGRAM
Payer: MEDICARE

## 2022-06-23 VITALS
HEIGHT: 64 IN | BODY MASS INDEX: 22.35 KG/M2 | WEIGHT: 130.94 LBS | DIASTOLIC BLOOD PRESSURE: 73 MMHG | HEART RATE: 67 BPM | RESPIRATION RATE: 18 BRPM | TEMPERATURE: 98 F | SYSTOLIC BLOOD PRESSURE: 130 MMHG

## 2022-06-23 DIAGNOSIS — R79.9 ABNORMAL FINDING OF BLOOD CHEMISTRY, UNSPECIFIED: ICD-10-CM

## 2022-06-23 DIAGNOSIS — E83.52 HYPERCALCEMIA: Primary | ICD-10-CM

## 2022-06-23 DIAGNOSIS — E04.1 THYROID NODULE: ICD-10-CM

## 2022-06-23 DIAGNOSIS — I10 HYPERTENSION, UNSPECIFIED TYPE: ICD-10-CM

## 2022-06-23 DIAGNOSIS — R93.3 ABNORMAL FINDINGS ON DIAGNOSTIC IMAGING OF OTHER PARTS OF DIGESTIVE TRACT: ICD-10-CM

## 2022-06-23 DIAGNOSIS — E21.3 HYPERPARATHYROIDISM: ICD-10-CM

## 2022-06-23 DIAGNOSIS — M81.0 OSTEOPOROSIS, UNSPECIFIED OSTEOPOROSIS TYPE, UNSPECIFIED PATHOLOGICAL FRACTURE PRESENCE: ICD-10-CM

## 2022-06-23 DIAGNOSIS — R68.89 FORGETFULNESS: ICD-10-CM

## 2022-06-23 DIAGNOSIS — E78.5 HYPERLIPIDEMIA, UNSPECIFIED HYPERLIPIDEMIA TYPE: ICD-10-CM

## 2022-06-23 DIAGNOSIS — E83.52 HYPERCALCEMIA: ICD-10-CM

## 2022-06-23 LAB
ALBUMIN SERPL-MCNC: 4 GM/DL (ref 3.4–4.8)
ALBUMIN/GLOB SERPL: 1.2 RATIO (ref 1.1–2)
ALP SERPL-CCNC: 60 UNIT/L (ref 40–150)
ALT SERPL-CCNC: 15 UNIT/L (ref 0–55)
APPEARANCE UR: CLEAR
AST SERPL-CCNC: 27 UNIT/L (ref 5–34)
BACTERIA #/AREA URNS AUTO: ABNORMAL /HPF
BASOPHILS # BLD AUTO: 0.05 X10(3)/MCL (ref 0–0.2)
BASOPHILS NFR BLD AUTO: 0.6 %
BILIRUB UR QL STRIP.AUTO: NEGATIVE MG/DL
BILIRUBIN DIRECT+TOT PNL SERPL-MCNC: 0.4 MG/DL
BUN SERPL-MCNC: 18.2 MG/DL (ref 9.8–20.1)
CALCIUM SERPL-MCNC: 9.9 MG/DL (ref 8.4–10.2)
CHLORIDE SERPL-SCNC: 110 MMOL/L (ref 98–107)
CHOLEST SERPL-MCNC: 175 MG/DL
CHOLEST/HDLC SERPL: 4 {RATIO} (ref 0–5)
CO2 SERPL-SCNC: 26 MMOL/L (ref 23–31)
COLOR UR AUTO: ABNORMAL
CREAT SERPL-MCNC: 0.79 MG/DL (ref 0.55–1.02)
CREAT UR-MCNC: 145.9 MG/DL (ref 47–110)
DEPRECATED CALCIDIOL+CALCIFEROL SERPL-MC: 46.7 NG/ML (ref 30–80)
EOSINOPHIL # BLD AUTO: 0.33 X10(3)/MCL (ref 0–0.9)
EOSINOPHIL NFR BLD AUTO: 4 %
ERYTHROCYTE [DISTWIDTH] IN BLOOD BY AUTOMATED COUNT: 13.2 % (ref 11.5–17)
EST. AVERAGE GLUCOSE BLD GHB EST-MCNC: 111.2 MG/DL
GLOBULIN SER-MCNC: 3.4 GM/DL (ref 2.4–3.5)
GLUCOSE SERPL-MCNC: 90 MG/DL (ref 82–115)
GLUCOSE UR QL STRIP.AUTO: NORMAL MG/DL
HBA1C MFR BLD: 5.5 %
HCT VFR BLD AUTO: 43.5 % (ref 37–47)
HDLC SERPL-MCNC: 39 MG/DL (ref 35–60)
HGB BLD-MCNC: 14.1 GM/DL (ref 12–16)
HYALINE CASTS #/AREA URNS LPF: ABNORMAL /LPF
IMM GRANULOCYTES # BLD AUTO: 0.04 X10(3)/MCL (ref 0–0.02)
IMM GRANULOCYTES NFR BLD AUTO: 0.5 % (ref 0–0.43)
KETONES UR QL STRIP.AUTO: NEGATIVE MG/DL
LDLC SERPL CALC-MCNC: 87 MG/DL (ref 50–140)
LEUKOCYTE ESTERASE UR QL STRIP.AUTO: 250 UNIT/L
LYMPHOCYTES # BLD AUTO: 2.89 X10(3)/MCL (ref 0.6–4.6)
LYMPHOCYTES NFR BLD AUTO: 35.4 %
MCH RBC QN AUTO: 31.4 PG (ref 27–31)
MCHC RBC AUTO-ENTMCNC: 32.4 MG/DL (ref 33–36)
MCV RBC AUTO: 96.9 FL (ref 80–94)
MONOCYTES # BLD AUTO: 0.79 X10(3)/MCL (ref 0.1–1.3)
MONOCYTES NFR BLD AUTO: 9.7 %
MUCOUS THREADS URNS QL MICRO: ABNORMAL /LPF
NEUTROPHILS # BLD AUTO: 4.1 X10(3)/MCL (ref 2.1–9.2)
NEUTROPHILS NFR BLD AUTO: 49.8 %
NITRITE UR QL STRIP.AUTO: NEGATIVE
NRBC BLD AUTO-RTO: 0 %
PH UR STRIP.AUTO: 5.5 [PH]
PLATELET # BLD AUTO: 208 X10(3)/MCL (ref 130–400)
PMV BLD AUTO: 12.3 FL (ref 9.4–12.4)
POTASSIUM SERPL-SCNC: 3.9 MMOL/L (ref 3.5–5.1)
PROT SERPL-MCNC: 7.4 GM/DL (ref 5.8–7.6)
PROT UR QL STRIP.AUTO: NEGATIVE MG/DL
PTH-INTACT SERPL-MCNC: 146.6 PG/ML (ref 8.7–77)
RBC # BLD AUTO: 4.49 X10(6)/MCL (ref 4.2–5.4)
RBC #/AREA URNS AUTO: ABNORMAL /HPF
RBC UR QL AUTO: NEGATIVE UNIT/L
SODIUM SERPL-SCNC: 143 MMOL/L (ref 136–145)
SP GR UR STRIP.AUTO: 1.03
SQUAMOUS #/AREA URNS LPF: ABNORMAL /HPF
TRIGL SERPL-MCNC: 246 MG/DL (ref 37–140)
UROBILINOGEN UR STRIP-ACNC: ABNORMAL MG/DL
VLDLC SERPL CALC-MCNC: 49 MG/DL
WBC # SPEC AUTO: 8.2 X10(3)/MCL (ref 4.5–11.5)
WBC #/AREA URNS AUTO: ABNORMAL /HPF

## 2022-06-23 PROCEDURE — 99214 PR OFFICE/OUTPT VISIT, EST, LEVL IV, 30-39 MIN: ICD-10-PCS | Mod: S$PBB,,, | Performed by: PSYCHIATRY & NEUROLOGY

## 2022-06-23 PROCEDURE — 85025 COMPLETE CBC W/AUTO DIFF WBC: CPT

## 2022-06-23 PROCEDURE — 36415 COLL VENOUS BLD VENIPUNCTURE: CPT

## 2022-06-23 PROCEDURE — 99214 OFFICE O/P EST MOD 30 MIN: CPT | Mod: S$PBB,,, | Performed by: PSYCHIATRY & NEUROLOGY

## 2022-06-23 PROCEDURE — 82570 ASSAY OF URINE CREATININE: CPT

## 2022-06-23 PROCEDURE — 83970 ASSAY OF PARATHORMONE: CPT

## 2022-06-23 PROCEDURE — 80053 COMPREHEN METABOLIC PANEL: CPT

## 2022-06-23 PROCEDURE — 82306 VITAMIN D 25 HYDROXY: CPT

## 2022-06-23 PROCEDURE — 87088 URINE BACTERIA CULTURE: CPT

## 2022-06-23 PROCEDURE — 80061 LIPID PANEL: CPT

## 2022-06-23 PROCEDURE — 83036 HEMOGLOBIN GLYCOSYLATED A1C: CPT

## 2022-06-23 PROCEDURE — 81001 URINALYSIS AUTO W/SCOPE: CPT

## 2022-06-23 PROCEDURE — 99214 OFFICE O/P EST MOD 30 MIN: CPT | Mod: PBBFAC

## 2022-06-23 NOTE — PROGRESS NOTES
INTERNAL MEDICINE RESIDENT CLINIC  CLINIC NOTE    Patient Name: Guillermina Stewart  YOB: 1940    PRESENTING HISTORY       History of Present Illness:  Ms. Guillermina Stewart is a 81 y.o. female w/ an active medical problem list including osteoporosis, tobacco use, HTN, depression, hyperparathyroidism who presents to the IM clinic for follow-up visit. Last seen on 02/2022. Since last visit, she was seen in Geriatrics clinic and diagnosed with early Alzheimer's. Consider adding Namenda 7mg XR.    Today:  Ms. Stewart is doing well. Occasionallly has trouble remembering names. Is a little forgetful with household objects. Is independent with bathing, eating, clothing herself, and managing her money. Daughter helps with checkbook b/c patient doesn't know how to write a check. Had 1 fall after slipping on wet concrete but overall steady on her feet. No CP, SOB, Abdominal pain, diarrhea    ROS   As above    PAST HISTORY:     No past medical history on file.    No past surgical history on file.    No family history on file.    Social History     Socioeconomic History    Marital status:     Number of children: 3   Occupational History    Occupation: retired   Tobacco Use    Smoking status: Heavy Tobacco Smoker     Packs/day: 0.50     Years: 60.00     Pack years: 30.00     Types: Cigarettes    Smokeless tobacco: Never Used   Substance and Sexual Activity    Alcohol use: Not Currently    Drug use: Not Currently    Sexual activity: Not Currently     Partners: Male       MEDICATIONS & ALLERGIES:     Current Outpatient Medications on File Prior to Visit   Medication Sig    amLODIPine (NORVASC) 5 MG tablet Take 5 mg by mouth once daily.    donepeziL (ARICEPT) 10 MG tablet Take 10 mg by mouth nightly.    folic acid/multivit-min/lutein (CENTRUM SILVER ORAL) Take by mouth.    lisinopriL (PRINIVIL,ZESTRIL) 20 MG tablet Take 20 mg by mouth once daily.    pravastatin (PRAVACHOL) 40 MG tablet Take 40 mg by mouth  once daily.    PROLIA 60 mg/mL Syrg SMARTSI Milliliter(s) SUB-Q Twice a Year     No current facility-administered medications on file prior to visit.       Review of patient's allergies indicates:   Allergen Reactions    Propoxyphene     Tramadol Nausea And Vomiting       OBJECTIVE:   Vital Signs:  There were no vitals filed for this visit.    No results found for this or any previous visit (from the past 24 hour(s)).      Physical Exam    General - Appears comfortable, appropriatley conversive   Mental Status - alert and oriented x 3, speaking in logical, relevant sentences   HEENT - no rhinorrhea   Cardiac - RRR, no murmurs, rubs, or gallops; no edema in LE   Respiratory - breathing comfortably; clear to ascultation bilaterally   Abdominal - nondistended, soft, nontender to palpation   Extremities - LE, UE, and joints are nonerythematous and nonswollen   Skin - no rashes or bruises seen on skin      Laboratory  Lab Results   Component Value Date    WBC 9.2 2021    HGB 13.1 2021    HCT 39.6 2021    MCV 99.2 2021     2021     @[id number](GLU,NA,K,Cl,CO2,BUN,Creatinine,Calcium,MG)@  No results found for: INR, PROTIME   Lab Results   Component Value Date    HGBA1C 5.4 2021     No results for input(s): POCTGLUCOSE in the last 72 hours.      ASSESSMENT & PLAN:     Osteoporosis  -likely secondary to smoking history, age and history of hysterectomy; now also has hyperparathyroidism  -Prolia injections started 2016, q6months. No need for drug holiday for Prolia  -continue Vit D 800U daily. In prior d/c'd her OTC vit D b/c it may have had calcium in it  -ordering DEXA 2022 overall the same as DEXA 2 years ago. Shows osteoporosis. Overall, improved from   -Continue Prolia    Hyperparathyroidism  -has osteoporosis so meets criteria for surgery  -Has Endocrinology appointment scheduled in 2022  -CT Parathyroid study - right-sided 7mm parathryoid nodule  -ordering  urine Cr and 24-hour Ca to check for FHH. If normal, will refer to ENT for parathyroidectomy    TIRADS 5 thyroid nodules  -8/2021 - has TIRADS 5 subcentimeter nodules to small to biopsy-rec is for repeat US in 1 year. Will order repeat US  -has Endocrinology appointment in 8/2022    Forgetfulness, Early Alzheimer's  -Evaluated in Geriatrics clinic. Has early Alzheimer's   -spoke with two different daughters who both raised concern over forgetfulness of relatives' names and of where she puts things around her house  -patient not oriented to the month and day of her birthday or to the current year; no visual/auditory hallucinations, tremors, or gait disturbances  -does have mood swings per daughter  -continue donepazil 10mg daily; consider adding memantine 7mg XL-family wishes to hold off  -B12, Folate, Syphilis oracio, LATOYA, RF, ESR, CRP are normal  -hypercalcemia may be contributing to confusion  -MRI Brain- no reversible, biological causes of her forgetfullness. Microvascular changes  -Assessed medical decision-making capacity and she does not have capacity. Her oldest daughter, Patricia, would be the medical decision maker. Her number is in the chart    Prediabetes  -ordering A1C    HTN  -advised keep BP log  -low salt diet  -Continue lisionpril 20mg daily and amlodipine 5mg daily  -d/c'd HCTZ in past b/c hypercalcemia    Tobacco use  -trying to quit smoking, has cut back from 1 ppd to 1/2 ppd  -not addressed this visit    Hyperlipidemia  -continue pravastatin 40mg    Psoriasis, scaly R upper arm/shoulder lesion (possibly seborrheic keratosis vs Actinic keratoses)  -doesn't want to be referred to derm/minor procedure clinic, will monitor  -topical calcitriol didn't help; rash isn't bothering patient    Difficulty hearing  -s/p cerumen disimpaction , audiology revealed mild impairment at very high frequencies  -improved    Depression-resolved  -has not had depression since about 2015  -denies suicidal and homicidal  ideation  -gets angry easily  -discontinued Celexa in past visit per patient request    Health maintenance  -refuses vaccines; due for tdap, pneumovax, shingrix, flu - patient refusing vaccines  -doesn't need routine cancer screenings given age (mammogram, pap smear, low-dose CT).  -patient has >30 pack year smoking history  -ordering CBC, CMP, A1C, Vit D, Lipid panel (non-fasting), PTH, Urine Ca, Urine Cr, UA    Other patient information  [id number]- lives in [place]. Her  passed away over 15 years ago. Grandson passed away. Used to arm-wrestle and likes to watch wrestling on TV. Was working still, cleaning peoples houses. She has 2 daughters and 1 son.  [id number] -her son is now living with her. She is active around the house doing chores. She goes to her friend's house and helps with the dishes there too.    RTC in 4 months with labs    Staff attestation to follow    Cruz Almendarez MD  Internal Medicine PGY-2

## 2022-06-25 LAB — BACTERIA UR CULT: NORMAL

## 2022-08-07 DIAGNOSIS — E83.52 HYPERCALCEMIA: Primary | ICD-10-CM

## 2022-08-08 NOTE — PROGRESS NOTES
Called and spoke with pts daughter. She is listed in pts chart as her main contact. Pt needed to cancel her Endo appt, but will call them today to reschedule. Pt will complete 24 urine on Sunday and turn in on Monday morning. Informed her of all results and she verbalized 100% understanding.

## 2022-08-29 ENCOUNTER — HOSPITAL ENCOUNTER (OUTPATIENT)
Dept: RADIOLOGY | Facility: HOSPITAL | Age: 82
Discharge: HOME | End: 2022-08-29
Attending: STUDENT IN AN ORGANIZED HEALTH CARE EDUCATION/TRAINING PROGRAM
Payer: MEDICARE

## 2022-08-29 DIAGNOSIS — E04.1 THYROID NODULE: ICD-10-CM

## 2022-08-29 PROCEDURE — 76536 US EXAM OF HEAD AND NECK: CPT | Mod: TC

## 2022-09-05 DIAGNOSIS — E83.52 HYPERCALCEMIA: Primary | ICD-10-CM

## 2022-09-07 ENCOUNTER — TELEPHONE (OUTPATIENT)
Dept: FAMILY MEDICINE | Facility: CLINIC | Age: 82
End: 2022-09-07
Payer: MEDICARE

## 2022-09-07 NOTE — TELEPHONE ENCOUNTER
----- Message from Cruz Almendarez MD sent at 9/5/2022  3:42 PM CDT -----  Thyroid ultrasound is the same as last year. There are some nodules that simply need to be monitored with repeat ultrasound later. I had also ordered a 24-hour urine sample, but I don't see that it was collected. I will place another 24-hour urine collection order; please call patient and remind her to get it done. Thanks.

## 2022-09-07 NOTE — TELEPHONE ENCOUNTER
"Pt's daughter called, mom's  and name verified. Pt results given to Leila and she verbalized 100% understanding. Informed about 24 hour urine collection. Leila reported her mom-"Guillermina is refusing to do it and she can't make her do it." Leila reported Mrs. Sarmiento is not going to be collecting an 24 hour urine. No further questions or concerns noted. Call ended.  "

## 2022-10-10 ENCOUNTER — LAB VISIT (OUTPATIENT)
Dept: LAB | Facility: HOSPITAL | Age: 82
End: 2022-10-10
Attending: STUDENT IN AN ORGANIZED HEALTH CARE EDUCATION/TRAINING PROGRAM
Payer: MEDICARE

## 2022-10-10 DIAGNOSIS — R79.9 ABNORMAL FINDING OF BLOOD CHEMISTRY, UNSPECIFIED: ICD-10-CM

## 2022-10-10 DIAGNOSIS — E83.52 HYPERCALCEMIA: ICD-10-CM

## 2022-10-10 DIAGNOSIS — I10 HYPERTENSION, UNSPECIFIED TYPE: ICD-10-CM

## 2022-10-10 DIAGNOSIS — M81.0 OSTEOPOROSIS, UNSPECIFIED OSTEOPOROSIS TYPE, UNSPECIFIED PATHOLOGICAL FRACTURE PRESENCE: ICD-10-CM

## 2022-10-10 DIAGNOSIS — E78.5 HYPERLIPIDEMIA, UNSPECIFIED HYPERLIPIDEMIA TYPE: ICD-10-CM

## 2022-10-10 LAB
ALBUMIN SERPL-MCNC: 4 GM/DL (ref 3.4–4.8)
ALBUMIN/GLOB SERPL: 1.2 RATIO (ref 1.1–2)
ALP SERPL-CCNC: 103 UNIT/L (ref 40–150)
ALT SERPL-CCNC: 14 UNIT/L (ref 0–55)
APPEARANCE UR: CLEAR
AST SERPL-CCNC: 22 UNIT/L (ref 5–34)
BACTERIA #/AREA URNS AUTO: ABNORMAL /HPF
BASOPHILS # BLD AUTO: 0.04 X10(3)/MCL (ref 0–0.2)
BASOPHILS NFR BLD AUTO: 0.6 %
BILIRUB UR QL STRIP.AUTO: NEGATIVE MG/DL
BILIRUBIN DIRECT+TOT PNL SERPL-MCNC: 0.7 MG/DL
BUN SERPL-MCNC: 12.6 MG/DL (ref 9.8–20.1)
CALCIUM SERPL-MCNC: 9.3 MG/DL (ref 8.4–10.2)
CHLORIDE SERPL-SCNC: 110 MMOL/L (ref 98–107)
CHOLEST SERPL-MCNC: 168 MG/DL
CHOLEST/HDLC SERPL: 4 {RATIO} (ref 0–5)
CO2 SERPL-SCNC: 24 MMOL/L (ref 23–31)
COLOR UR AUTO: YELLOW
CREAT SERPL-MCNC: 0.7 MG/DL (ref 0.55–1.02)
DEPRECATED CALCIDIOL+CALCIFEROL SERPL-MC: 39.4 NG/ML (ref 30–80)
EOSINOPHIL # BLD AUTO: 0.28 X10(3)/MCL (ref 0–0.9)
EOSINOPHIL NFR BLD AUTO: 3.9 %
ERYTHROCYTE [DISTWIDTH] IN BLOOD BY AUTOMATED COUNT: 13.2 % (ref 11.5–17)
GFR SERPLBLD CREATININE-BSD FMLA CKD-EPI: >60 MLS/MIN/1.73/M2
GLOBULIN SER-MCNC: 3.3 GM/DL (ref 2.4–3.5)
GLUCOSE SERPL-MCNC: 93 MG/DL (ref 82–115)
GLUCOSE UR QL STRIP.AUTO: NORMAL MG/DL
HCT VFR BLD AUTO: 46 % (ref 37–47)
HDLC SERPL-MCNC: 46 MG/DL (ref 35–60)
HGB BLD-MCNC: 15.3 GM/DL (ref 12–16)
HYALINE CASTS #/AREA URNS LPF: ABNORMAL /LPF
IMM GRANULOCYTES # BLD AUTO: 0.03 X10(3)/MCL (ref 0–0.04)
IMM GRANULOCYTES NFR BLD AUTO: 0.4 %
KETONES UR QL STRIP.AUTO: NEGATIVE MG/DL
LDLC SERPL CALC-MCNC: 98 MG/DL (ref 50–140)
LEUKOCYTE ESTERASE UR QL STRIP.AUTO: 250 UNIT/L
LYMPHOCYTES # BLD AUTO: 2.33 X10(3)/MCL (ref 0.6–4.6)
LYMPHOCYTES NFR BLD AUTO: 32.9 %
MCH RBC QN AUTO: 31.4 PG (ref 27–31)
MCHC RBC AUTO-ENTMCNC: 33.3 MG/DL (ref 33–36)
MCV RBC AUTO: 94.5 FL (ref 80–94)
MONOCYTES # BLD AUTO: 0.59 X10(3)/MCL (ref 0.1–1.3)
MONOCYTES NFR BLD AUTO: 8.3 %
MUCOUS THREADS URNS QL MICRO: ABNORMAL /LPF
NEUTROPHILS # BLD AUTO: 3.8 X10(3)/MCL (ref 2.1–9.2)
NEUTROPHILS NFR BLD AUTO: 53.9 %
NITRITE UR QL STRIP.AUTO: NEGATIVE
NRBC BLD AUTO-RTO: 0 %
PH UR STRIP.AUTO: 6.5 [PH]
PLATELET # BLD AUTO: 222 X10(3)/MCL (ref 130–400)
PMV BLD AUTO: 12.9 FL (ref 7.4–10.4)
POTASSIUM SERPL-SCNC: 3.9 MMOL/L (ref 3.5–5.1)
PROT SERPL-MCNC: 7.3 GM/DL (ref 5.8–7.6)
PROT UR QL STRIP.AUTO: ABNORMAL MG/DL
PTH-INTACT SERPL-MCNC: 236.8 PG/ML (ref 8.7–77)
RBC # BLD AUTO: 4.87 X10(6)/MCL (ref 4.2–5.4)
RBC #/AREA URNS AUTO: ABNORMAL /HPF
RBC UR QL AUTO: NEGATIVE UNIT/L
SODIUM SERPL-SCNC: 142 MMOL/L (ref 136–145)
SP GR UR STRIP.AUTO: 1.02
SQUAMOUS #/AREA URNS LPF: ABNORMAL /HPF
TRIGL SERPL-MCNC: 120 MG/DL (ref 37–140)
UROBILINOGEN UR STRIP-ACNC: NORMAL MG/DL
VLDLC SERPL CALC-MCNC: 24 MG/DL
WBC # SPEC AUTO: 7.1 X10(3)/MCL (ref 4.5–11.5)
WBC #/AREA URNS AUTO: ABNORMAL /HPF

## 2022-10-10 PROCEDURE — 82306 VITAMIN D 25 HYDROXY: CPT

## 2022-10-10 PROCEDURE — 80061 LIPID PANEL: CPT

## 2022-10-10 PROCEDURE — 80053 COMPREHEN METABOLIC PANEL: CPT

## 2022-10-10 PROCEDURE — 36415 COLL VENOUS BLD VENIPUNCTURE: CPT

## 2022-10-10 PROCEDURE — 85025 COMPLETE CBC W/AUTO DIFF WBC: CPT

## 2022-10-10 PROCEDURE — 83970 ASSAY OF PARATHORMONE: CPT

## 2022-10-10 PROCEDURE — 81001 URINALYSIS AUTO W/SCOPE: CPT

## 2022-10-13 ENCOUNTER — OFFICE VISIT (OUTPATIENT)
Dept: INTERNAL MEDICINE | Facility: CLINIC | Age: 82
End: 2022-10-13
Payer: MEDICARE

## 2022-10-13 VITALS
HEART RATE: 67 BPM | TEMPERATURE: 98 F | DIASTOLIC BLOOD PRESSURE: 67 MMHG | BODY MASS INDEX: 21.62 KG/M2 | SYSTOLIC BLOOD PRESSURE: 116 MMHG | HEIGHT: 64 IN | WEIGHT: 126.63 LBS

## 2022-10-13 DIAGNOSIS — E83.52 HYPERCALCEMIA: ICD-10-CM

## 2022-10-13 DIAGNOSIS — R68.89 FORGETFULNESS: ICD-10-CM

## 2022-10-13 DIAGNOSIS — E04.1 THYROID NODULE: ICD-10-CM

## 2022-10-13 DIAGNOSIS — I10 HYPERTENSION, UNSPECIFIED TYPE: ICD-10-CM

## 2022-10-13 DIAGNOSIS — E21.3 HYPERPARATHYROIDISM: ICD-10-CM

## 2022-10-13 DIAGNOSIS — E78.5 HYPERLIPIDEMIA, UNSPECIFIED HYPERLIPIDEMIA TYPE: Primary | ICD-10-CM

## 2022-10-13 DIAGNOSIS — R73.03 PREDIABETES: ICD-10-CM

## 2022-10-13 DIAGNOSIS — Z00.00 HEALTHCARE MAINTENANCE: ICD-10-CM

## 2022-10-13 DIAGNOSIS — Z72.0 TOBACCO USER: ICD-10-CM

## 2022-10-13 DIAGNOSIS — M81.0 OSTEOPOROSIS, UNSPECIFIED OSTEOPOROSIS TYPE, UNSPECIFIED PATHOLOGICAL FRACTURE PRESENCE: ICD-10-CM

## 2022-10-13 PROCEDURE — 99213 OFFICE O/P EST LOW 20 MIN: CPT | Mod: PBBFAC

## 2022-10-13 RX ORDER — LISINOPRIL 10 MG/1
10 TABLET ORAL DAILY
Qty: 90 TABLET | Refills: 2 | Status: SHIPPED | OUTPATIENT
Start: 2022-10-13 | End: 2022-12-01 | Stop reason: DRUGHIGH

## 2022-10-13 RX ORDER — MULTIVIT-MIN/FOLIC ACID/LUTEIN 400-250MCG
1 TABLET,CHEWABLE ORAL DAILY
Qty: 90 TABLET | Refills: 3 | Status: SHIPPED | OUTPATIENT
Start: 2022-10-13 | End: 2023-03-30

## 2022-10-13 RX ORDER — CALCIUM CARB/VITAMIN D3/VIT K1 500-500-40
400 TABLET,CHEWABLE ORAL DAILY
Qty: 90 CAPSULE | Refills: 3 | Status: SHIPPED | OUTPATIENT
Start: 2022-10-13 | End: 2023-03-30

## 2022-10-13 RX ORDER — DONEPEZIL HYDROCHLORIDE 10 MG/1
10 TABLET, FILM COATED ORAL NIGHTLY
Qty: 90 TABLET | Refills: 2 | Status: SHIPPED | OUTPATIENT
Start: 2022-10-13 | End: 2022-12-01

## 2022-10-13 NOTE — PROGRESS NOTES
[facility]  CLINIC NOTE    Patient Name: Guillermina Stewart  YOB: 1940    PRESENTING HISTORY       History of Present Illness:  Ms. Guillermina Stewart is a 82 y.o. female who is coming in for a follow up visit. Today she is doing well. Her son, who lives with her, and daughter have noticed some progression in her forgetfullness. She is bathing and clothing herself; has good hygeine. She has her baseline appetitie. She drives and doesn't get lost. She is forgetful though. Has trouble remembering names. Today she did not remember the year she was born and didn't know the current year. Mentions some dysuria and is currently being treated for a UTI by an urgent care. She did have two falls last month and had felt dizzy that day. The family has stopped her norvasc.    ROS  No CP, SOB, abdominal pain. Does have some dysuria    PAST HISTORY:     No past medical history on file.    No past surgical history on file.    No family history on file.    Social History     Socioeconomic History    Marital status:     Number of children: 3   Occupational History    Occupation: retired   Tobacco Use    Smoking status: Light Smoker     Packs/day: 0.25     Years: 60.00     Pack years: 15.00     Types: Cigarettes    Smokeless tobacco: Never   Substance and Sexual Activity    Alcohol use: Not Currently    Drug use: Never    Sexual activity: Not Currently     Partners: Male     Social Determinants of Health     Financial Resource Strain: Low Risk     Difficulty of Paying Living Expenses: Not hard at all   Food Insecurity: No Food Insecurity    Worried About Running Out of Food in the Last Year: Never true    Ran Out of Food in the Last Year: Never true   Transportation Needs: No Transportation Needs    Lack of Transportation (Medical): No    Lack of Transportation (Non-Medical): No   Physical Activity: Inactive    Days of Exercise per Week: 0 days    Minutes of Exercise per Session: 0 min   Stress: No  "Stress Concern Present    Feeling of Stress : Not at all   Social Connections: Socially Isolated    Frequency of Communication with Friends and Family: More than three times a week    Frequency of Social Gatherings with Friends and Family: More than three times a week    Attends Voodoo Services: Never    Active Member of Clubs or Organizations: No    Attends Club or Organization Meetings: Never    Marital Status:    Housing Stability: Low Risk     Unable to Pay for Housing in the Last Year: No    Number of Places Lived in the Last Year: 1    Unstable Housing in the Last Year: No       MEDICATIONS & ALLERGIES:     Current Outpatient Medications on File Prior to Visit   Medication Sig    donepeziL (ARICEPT) 10 MG tablet Take 10 mg by mouth nightly.    lisinopriL (PRINIVIL,ZESTRIL) 20 MG tablet Take 20 mg by mouth once daily.    pravastatin (PRAVACHOL) 40 MG tablet Take 40 mg by mouth once daily.    PROLIA 60 mg/mL Syrg SMARTSI Milliliter(s) SUB-Q Twice a Year    folic acid/multivit-min/lutein (CENTRUM SILVER ORAL) Take by mouth.    [DISCONTINUED] amLODIPine (NORVASC) 5 MG tablet Take 5 mg by mouth once daily.     No current facility-administered medications on file prior to visit.       Review of patient's allergies indicates:   Allergen Reactions    Propoxyphene     Tramadol Nausea And Vomiting       OBJECTIVE:   Vital Signs:  Vitals:    10/13/22 1051   BP: 116/67   Pulse: 67   Temp: 98.3 °F (36.8 °C)   Weight: 57.4 kg (126 lb 9.6 oz)   Height: 5' 4" (1.626 m)       No results found for this or any previous visit (from the past 24 hour(s)).      Physical Exam    General - Appears comfortable, appropriatley conversive   Mental Status - alert and oriented x 2  HEENT - no rhinorrhea   Cardiac - RRR, no murmurs, rubs, or gallops; no edema in LE   Respiratory - breathing comfortably; clear to ascultation bilaterally   Abdominal - nondistended, soft, nontender to palpation   Extremities - LE, UE, and joints " are nonerythematous and nonswollen   Skin - no rashes or bruises seen on skin        Laboratory  Lab Results   Component Value Date    WBC 7.1 10/10/2022    HGB 15.3 10/10/2022    HCT 46.0 10/10/2022    MCV 94.5 (H) 10/10/2022     10/10/2022     @[id number](GLU,NA,K,Cl,CO2,BUN,Creatinine,Calcium,MG)@  No results found for: INR, PROTIME  Lab Results   Component Value Date    HGBA1C 5.5 06/23/2022     No results for input(s): POCTGLUCOSE in the last 72 hours.        ASSESSMENT & PLAN:       Osteoporosis  -likely secondary to smoking history, age and history of hysterectomy; now also has hyperparathyroidism  -Prolia injections started 7/2016, q6months. Continue  -continue Vit D 800U daily.   -repeat DEXA scan 2/2020, improved from DEXA 2016   -had two falls last month. Will decrease BP meds    Hyperparathyroidism  -has osteoporosis so meets criteria for surgery  -Had denied Endocrinology appointment before. I told her children that they should bring her in for an Endocrinology appointment even if she doesn't want to since she has dementia. They are agreeable. Will message Endocrinology clinic  -Ordering CMP, Mg, Ph, PTH, Vitamin D level    TIRADS 5 thyroid nodules  -has TIRADS 5 subcentimeter nodules to small to biopsy-rec is for repeat US in 1 year.  -ordering repeat thyroid US    Concern for early Alzheimer's  -continue donepazil 10mg daily  -B12, Folate, Syphilis oracio, LATOYA, RF, ESR, CRP are normal  -Assessed medical decision-making capacity and she does not have capacity. Her oldest daughter, Patricia, would be the medical decision maker. Her number is in the chart  -follow up with Geriatric clinic in December    Prediabetes  -A1C most recently not in prediabetic range  -will recheck A1C next visit    HTN  -advised keep BP log  -low salt diet  -She is no longer taking norvasc  -Will decrease lisinopril from 20 to 10mg daily  -d/c'd HCTZ in past b/c hypercalcemia    Tobacco use  -is smokiing < 1/2 ppd  -not  addressed this visit    Hyperlipidemia  -continue pravastatin 40mg    Depression-resolved  -has not had depression since about 2015  -denies suicidal and homicidal ideation  -gets angry easily  -discontinued Celexa in past visit per patient request    Left sided sciatica- improved  -able to ambulate well with cane  -stopped taking gabapentin    Ischiogluteal bursitis - improved  - taking Tylenol with relief  -not mentioned today    Health maintenance  -due for tdap, pneumovax, shingrix, flu - patient refusing vaccines  -doesn't need routine cancer screenings given age (mammogram, pap smear, low-dose CT). Will discuss pros/cons of mammogram with her children next visit  -patient has >30 pack year smoking history    Other patient information  2021-lives in [place]. Her  passed away over 15 years ago. Grandson passed away. Used to arm-wrestle and likes to watch wrestling on TV. 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 She has 2 daughters and 1 son.  2022-currently lives with her son. She does drive and visit friends' houses.        RTC in 4    Discussed with Dr. Garrett  - staff attestation to follow    Cruz Almendarez MD  Internal Medicine PGY-3

## 2022-10-21 ENCOUNTER — HOSPITAL ENCOUNTER (OUTPATIENT)
Dept: RADIOLOGY | Facility: HOSPITAL | Age: 82
Discharge: HOME | End: 2022-10-21
Attending: STUDENT IN AN ORGANIZED HEALTH CARE EDUCATION/TRAINING PROGRAM
Payer: MEDICARE

## 2022-10-21 DIAGNOSIS — E04.1 THYROID NODULE: ICD-10-CM

## 2022-10-21 PROCEDURE — 76536 US EXAM OF HEAD AND NECK: CPT | Mod: TC

## 2022-11-13 ENCOUNTER — OFFICE VISIT (OUTPATIENT)
Dept: URGENT CARE | Facility: CLINIC | Age: 82
End: 2022-11-13
Payer: MEDICARE

## 2022-11-13 ENCOUNTER — HOSPITAL ENCOUNTER (OUTPATIENT)
Dept: RADIOLOGY | Facility: HOSPITAL | Age: 82
Discharge: HOME | End: 2022-11-13
Attending: NURSE PRACTITIONER
Payer: MEDICARE

## 2022-11-13 ENCOUNTER — HOSPITAL ENCOUNTER (EMERGENCY)
Facility: HOSPITAL | Age: 82
Discharge: HOME | End: 2022-11-14
Attending: EMERGENCY MEDICINE
Payer: MEDICARE

## 2022-11-13 VITALS
DIASTOLIC BLOOD PRESSURE: 73 MMHG | HEART RATE: 73 BPM | TEMPERATURE: 98 F | WEIGHT: 127.63 LBS | OXYGEN SATURATION: 98 % | HEIGHT: 64 IN | RESPIRATION RATE: 20 BRPM | SYSTOLIC BLOOD PRESSURE: 169 MMHG | BODY MASS INDEX: 21.79 KG/M2

## 2022-11-13 VITALS
SYSTOLIC BLOOD PRESSURE: 130 MMHG | WEIGHT: 127 LBS | BODY MASS INDEX: 21.79 KG/M2 | RESPIRATION RATE: 18 BRPM | TEMPERATURE: 98 F | HEART RATE: 64 BPM | DIASTOLIC BLOOD PRESSURE: 77 MMHG | OXYGEN SATURATION: 95 %

## 2022-11-13 DIAGNOSIS — M25.551 RIGHT HIP PAIN: ICD-10-CM

## 2022-11-13 DIAGNOSIS — M54.50 ACUTE LEFT-SIDED LOW BACK PAIN WITHOUT SCIATICA: Primary | ICD-10-CM

## 2022-11-13 DIAGNOSIS — M54.50 LOW BACK PAIN, UNSPECIFIED BACK PAIN LATERALITY, UNSPECIFIED CHRONICITY, UNSPECIFIED WHETHER SCIATICA PRESENT: ICD-10-CM

## 2022-11-13 DIAGNOSIS — R53.1 WEAKNESS: ICD-10-CM

## 2022-11-13 DIAGNOSIS — M54.9 BACK PAIN, UNSPECIFIED BACK LOCATION, UNSPECIFIED BACK PAIN LATERALITY, UNSPECIFIED CHRONICITY: Primary | ICD-10-CM

## 2022-11-13 LAB
ALBUMIN SERPL-MCNC: 3.4 GM/DL (ref 3.4–4.8)
ALBUMIN/GLOB SERPL: 1.1 RATIO (ref 1.1–2)
ALP SERPL-CCNC: 96 UNIT/L (ref 40–150)
ALT SERPL-CCNC: 11 UNIT/L (ref 0–55)
APPEARANCE UR: CLEAR
AST SERPL-CCNC: 17 UNIT/L (ref 5–34)
BACTERIA #/AREA URNS AUTO: ABNORMAL /HPF
BASOPHILS # BLD AUTO: 0.04 X10(3)/MCL (ref 0–0.2)
BASOPHILS NFR BLD AUTO: 0.3 %
BILIRUB UR QL STRIP.AUTO: NEGATIVE MG/DL
BILIRUBIN DIRECT+TOT PNL SERPL-MCNC: 1 MG/DL
BUN SERPL-MCNC: 12.1 MG/DL (ref 9.8–20.1)
CALCIUM SERPL-MCNC: 8.7 MG/DL (ref 8.4–10.2)
CHLORIDE SERPL-SCNC: 105 MMOL/L (ref 98–107)
CO2 SERPL-SCNC: 24 MMOL/L (ref 23–31)
COLOR UR AUTO: YELLOW
CREAT SERPL-MCNC: 0.7 MG/DL (ref 0.55–1.02)
EOSINOPHIL # BLD AUTO: 0.06 X10(3)/MCL (ref 0–0.9)
EOSINOPHIL NFR BLD AUTO: 0.5 %
ERYTHROCYTE [DISTWIDTH] IN BLOOD BY AUTOMATED COUNT: 13.2 % (ref 11.5–17)
FLUAV AG UPPER RESP QL IA.RAPID: NOT DETECTED
FLUBV AG UPPER RESP QL IA.RAPID: NOT DETECTED
GFR SERPLBLD CREATININE-BSD FMLA CKD-EPI: >60 MLS/MIN/1.73/M2
GLOBULIN SER-MCNC: 3 GM/DL (ref 2.4–3.5)
GLUCOSE SERPL-MCNC: 120 MG/DL (ref 82–115)
GLUCOSE UR QL STRIP.AUTO: NEGATIVE MG/DL
HCT VFR BLD AUTO: 40.4 % (ref 37–47)
HGB BLD-MCNC: 13.6 GM/DL (ref 12–16)
IMM GRANULOCYTES # BLD AUTO: 0.07 X10(3)/MCL (ref 0–0.04)
IMM GRANULOCYTES NFR BLD AUTO: 0.5 %
KETONES UR QL STRIP.AUTO: NEGATIVE MG/DL
LEUKOCYTE ESTERASE UR QL STRIP.AUTO: ABNORMAL UNIT/L
LYMPHOCYTES # BLD AUTO: 2.53 X10(3)/MCL (ref 0.6–4.6)
LYMPHOCYTES NFR BLD AUTO: 19.7 %
MCH RBC QN AUTO: 31.4 PG (ref 27–31)
MCHC RBC AUTO-ENTMCNC: 33.7 MG/DL (ref 33–36)
MCV RBC AUTO: 93.3 FL (ref 80–94)
MONOCYTES # BLD AUTO: 1.3 X10(3)/MCL (ref 0.1–1.3)
MONOCYTES NFR BLD AUTO: 10.1 %
NEUTROPHILS # BLD AUTO: 8.8 X10(3)/MCL (ref 2.1–9.2)
NEUTROPHILS NFR BLD AUTO: 68.9 %
NITRITE UR QL STRIP.AUTO: NEGATIVE
NRBC BLD AUTO-RTO: 0 %
PH UR STRIP.AUTO: 6 [PH]
PLATELET # BLD AUTO: 205 X10(3)/MCL (ref 130–400)
PMV BLD AUTO: 12.2 FL (ref 7.4–10.4)
POTASSIUM SERPL-SCNC: 3.6 MMOL/L (ref 3.5–5.1)
PROT SERPL-MCNC: 6.4 GM/DL (ref 5.8–7.6)
PROT UR QL STRIP.AUTO: ABNORMAL MG/DL
RBC # BLD AUTO: 4.33 X10(6)/MCL (ref 4.2–5.4)
RBC #/AREA URNS AUTO: <5 /HPF
RBC UR QL AUTO: NEGATIVE UNIT/L
SARS-COV-2 RNA RESP QL NAA+PROBE: NOT DETECTED
SODIUM SERPL-SCNC: 137 MMOL/L (ref 136–145)
SP GR UR STRIP.AUTO: 1.02 (ref 1–1.03)
SQUAMOUS #/AREA URNS AUTO: <5 /HPF
TROPONIN I SERPL-MCNC: <0.01 NG/ML (ref 0–0.04)
UROBILINOGEN UR STRIP-ACNC: 1 MG/DL
WBC # SPEC AUTO: 12.8 X10(3)/MCL (ref 4.5–11.5)
WBC #/AREA URNS AUTO: 14 /HPF

## 2022-11-13 PROCEDURE — 99214 PR OFFICE/OUTPT VISIT, EST, LEVL IV, 30-39 MIN: ICD-10-PCS | Mod: S$PBB,,, | Performed by: NURSE PRACTITIONER

## 2022-11-13 PROCEDURE — 0240U COVID/FLU A&B PCR: CPT | Performed by: EMERGENCY MEDICINE

## 2022-11-13 PROCEDURE — 80053 COMPREHEN METABOLIC PANEL: CPT | Performed by: EMERGENCY MEDICINE

## 2022-11-13 PROCEDURE — 84484 ASSAY OF TROPONIN QUANT: CPT | Performed by: EMERGENCY MEDICINE

## 2022-11-13 PROCEDURE — 72100 X-RAY EXAM L-S SPINE 2/3 VWS: CPT | Mod: TC

## 2022-11-13 PROCEDURE — 99215 OFFICE O/P EST HI 40 MIN: CPT | Mod: PBBFAC,25 | Performed by: NURSE PRACTITIONER

## 2022-11-13 PROCEDURE — 81001 URINALYSIS AUTO W/SCOPE: CPT | Performed by: EMERGENCY MEDICINE

## 2022-11-13 PROCEDURE — 99214 OFFICE O/P EST MOD 30 MIN: CPT | Mod: S$PBB,,, | Performed by: NURSE PRACTITIONER

## 2022-11-13 PROCEDURE — 99285 EMERGENCY DEPT VISIT HI MDM: CPT | Mod: 25,27

## 2022-11-13 PROCEDURE — 73502 X-RAY EXAM HIP UNI 2-3 VIEWS: CPT | Mod: TC,RT

## 2022-11-13 PROCEDURE — 85025 COMPLETE CBC W/AUTO DIFF WBC: CPT | Performed by: EMERGENCY MEDICINE

## 2022-11-13 PROCEDURE — 87088 URINE BACTERIA CULTURE: CPT | Performed by: EMERGENCY MEDICINE

## 2022-11-13 NOTE — PROGRESS NOTES
"Subjective:       Patient ID: Guillermina Stewart is a 82 y.o. female.    Vitals:  height is 5' 4.02" (1.626 m) and weight is 57.9 kg (127 lb 9.6 oz). Her oral temperature is 98.2 °F (36.8 °C). Her blood pressure is 169/73 (abnormal) and her pulse is 73. Her respiration is 20 and oxygen saturation is 98%.     Chief Complaint: Back Pain (Pt sts started yesterday. Daughter sts she asked pt if she had fallen pt sts she does not know/remember )    Patient is an 82-year-old female, here today for back pain.  Daughter brought patient to the office, helped answer questions.  States that patient is not accurate historian, history of dementia.  Patient's daughter voices concern of fall, states patient has told family members yes and no when asked if she fell.  Family members have not witnessed to fall.  Patient's daughter states that she thinks she is limping on her right side. Unsure if pt is having any issues urinating.  not taking anything for pain.       Constitution: Negative.   Cardiovascular: Negative.    Respiratory: Negative.     Genitourinary: Negative.    Musculoskeletal:  Positive for back pain.   Neurological:  Negative for numbness and tingling.     Objective:      Physical Exam   Constitutional: She is oriented to person, place, and time. She appears well-developed.   HENT:   Head: Normocephalic.   Eyes: Conjunctivae and EOM are normal. Pupils are equal, round, and reactive to light.   Neck: Neck supple.   Cardiovascular: Normal rate, regular rhythm and normal heart sounds.   Pulmonary/Chest: Effort normal and breath sounds normal.   Abdominal: Bowel sounds are normal. She exhibits no distension. Soft. There is no abdominal tenderness. There is no left CVA tenderness and no right CVA tenderness.   Musculoskeletal: Normal range of motion.         General: Normal range of motion.        Arms:    Neurological: She is alert and oriented to person, place, and time.   Skin: Skin is warm and dry.   Psychiatric: Her " behavior is normal.   Vitals reviewed.      Assessment:       1. Back pain, unspecified back location, unspecified back pain laterality, unspecified chronicity    2. Low back pain, unspecified back pain laterality, unspecified chronicity, unspecified whether sciatica present    3. Right hip pain               XR LUMBAR SPINE 2 OR 3 VIEWS     CLINICAL HISTORY:  Low back pain, unspecified     TECHNIQUE:  Three views of the lumbar spine.     COMPARISON:  None     FINDINGS:  Degenerative changes with facet joint hypertrophy.  Vertebral body height appears relatively well maintained.  There is loss of disc space at L3-L4.  Further evaluation is limited secondary to overlying osseous structures and calcific disease within the aorta.     Impression:     Moderate to severe degenerative changes with no acute fracture identified.Further evaluation is limited secondary to overlying osseous structures and calcific disease within the aorta.        Electronically signed by: Melo Guzman  Date:                                            11/13/2022  Time:                                           17:20     Plan:           XR R hip pending, will notify of abnormal results.   May take tylenol otc as directed for pain.   Pt unable to provide urine sample in office. Given supplies to obtain urine specimen at home. Pt's daughter understands she is to push fluids this evening and if pt is unable to urinate within 6 hours she is to take her to the ER.       Back pain, unspecified back location, unspecified back pain laterality, unspecified chronicity  -     Cancel: POCT Urinalysis, Dipstick, Automated, W/O Scope    Low back pain, unspecified back pain laterality, unspecified chronicity, unspecified whether sciatica present  -     XR LUMBAR SPINE 2 OR 3 VIEWS  -     Urinalysis, Reflex to Urine Culture Urine, Clean Catch; Future; Expected date: 11/13/2022    Right hip pain  -     Cancel: X-Ray Hip 2 or 3 views Right (with Pelvis when  performed)  -     X-Ray Hip 2 or 3 views Right (with Pelvis when performed); Future; Expected date: 11/13/2022  -     X-Ray Hip 2 or 3 views Right (with Pelvis when performed)

## 2022-11-14 NOTE — ED PROVIDER NOTES
Encounter Date: 11/13/2022    SCRIBE #1 NOTE: I, Ronaldo Johnson, am scribing for, and in the presence of,  Dr. Lewis. I have scribed the following portions of the note - Other sections scribed: HPI, ROS, Physical Exam, MDM, Attending.     History     Chief Complaint   Patient presents with    Weakness    Altered Mental Status     Was seen at [facility] clinic for weakness, back pain, increasing confusion.  Was unable to obtain urine sample, suspected UTI      83 y/o CF with history of HTN, HLD and dementia presents to ED for back pain onset yesterday.  Pt's family member reports she needed to be held up while ambulating today.  Pt says she does not have any back pain currently.  Family member also reports generalized weakness and confusion.  Pt was seen at [facility] earlier, but urine was unable to be obtained.  Pt took 2x Tylenol just PTA.  She denies abdominal pain, and family member denies any bladder incontinence.    The history is provided by the patient and a relative.   Altered Mental Status  This is a new problem. The current episode started yesterday. The problem occurs constantly. The problem has not changed since onset.Pertinent negatives include no chest pain, no abdominal pain, no headaches and no shortness of breath.   Review of patient's allergies indicates:   Allergen Reactions    Propoxyphene     Tramadol Nausea And Vomiting     Past Medical History:   Diagnosis Date    Hyperlipidemia     Hypertension      No past surgical history on file.  Family History   Family history unknown: Yes     Social History     Tobacco Use    Smoking status: Light Smoker     Packs/day: 0.25     Years: 60.00     Pack years: 15.00     Types: Cigarettes    Smokeless tobacco: Never   Substance Use Topics    Alcohol use: Not Currently    Drug use: Never     Review of Systems   Constitutional:  Negative for activity change, chills, diaphoresis, fatigue and fever.        Generalized weakness   HENT:  Negative for congestion, ear pain,  sinus pain and sore throat.    Eyes:  Negative for visual disturbance.   Respiratory:  Negative for cough, shortness of breath, wheezing and stridor.    Cardiovascular:  Negative for chest pain, palpitations and leg swelling.   Gastrointestinal:  Negative for abdominal pain, constipation, diarrhea, nausea, rectal pain and vomiting.   Genitourinary:  Negative for dysuria and hematuria.   Musculoskeletal:  Positive for back pain. Negative for arthralgias and myalgias.   Skin:  Negative for rash.   Neurological:  Negative for dizziness, syncope, weakness, numbness and headaches.        Denies bladder incontinence   Psychiatric/Behavioral:  Positive for confusion.    All other systems reviewed and are negative.    Physical Exam     Initial Vitals [11/13/22 2115]   BP Pulse Resp Temp SpO2   (!) 143/81 63 18 97.7 °F (36.5 °C) 95 %      MAP       --         Physical Exam    Nursing note and vitals reviewed.  Constitutional: No distress.   HENT:   Head: Normocephalic and atraumatic.   Eyes: EOM are normal.   Neck: Trachea normal. Neck supple.   Normal range of motion.  Cardiovascular:  Normal rate and regular rhythm.           No murmur heard.  Pulmonary/Chest: Breath sounds normal. No respiratory distress.   Abdominal: Abdomen is soft. Bowel sounds are normal. She exhibits no distension and no mass. There is no abdominal tenderness.   No pulsatile mass There is no rebound and no guarding.   Musculoskeletal:         General: Normal range of motion.      Cervical back: Normal range of motion and neck supple.      Lumbar back: Normal range of motion.      Comments: No thoracic or lumbar spine tenderness, step-offs or deformities      Neurological: She is alert and oriented to person, place, and time. She has normal strength.   Skin: Skin is warm and dry. No rash noted.   Psychiatric: She has a normal mood and affect.       ED Course   Procedures  Labs Reviewed   URINALYSIS - Abnormal; Notable for the following components:        Result Value    Protein, UA 1+ (*)     Leukocyte Esterase, UA 2+ (*)     All other components within normal limits   URINALYSIS, MICROSCOPIC - Abnormal; Notable for the following components:    WBC, UA 14 (*)     All other components within normal limits   COMPREHENSIVE METABOLIC PANEL - Abnormal; Notable for the following components:    Glucose Level 120 (*)     All other components within normal limits   CBC WITH DIFFERENTIAL - Abnormal; Notable for the following components:    WBC 12.8 (*)     MCH 31.4 (*)     MPV 12.2 (*)     IG# 0.07 (*)     All other components within normal limits   COVID/FLU A&B PCR - Normal    Narrative:     The Xpert Xpress SARS-CoV-2/FLU/RSV plus is a rapid, multiplexed real-time PCR test intended for the simultaneous qualitative detection and differentiation of SARS-CoV-2, Influenza A, Influenza B, and respiratory syncytial virus (RSV) viral RNA in either nasopharyngeal swab or nasal swab specimens.         TROPONIN I - Normal   CULTURE, URINE   CBC W/ AUTO DIFFERENTIAL    Narrative:     The following orders were created for panel order CBC auto differential.  Procedure                               Abnormality         Status                     ---------                               -----------         ------                     CBC with Differential[[id number]]        Abnormal            Final result                 Please view results for these tests on the individual orders.          Imaging Results              CT Abdomen Pelvis  Without Contrast (Preliminary result)  Result time 11/14/22 00:25:25      Preliminary result by Jeronimo Wadsworth MD (11/14/22 00:25:25)                   Narrative:    START OF REPORT:  Technique: CT of the abdomen and pelvis was performed with axial images as well as sagittal and coronal reconstruction images without intravenous contrast.    Comparison: None available.    Clinical History: Confusion, weakness, poss uti.    Dosage Information: Automated  Exposure Control was utilized.    Findings:  Technical notes: Mild motion artifact is seen.  Lines and Tubes: None.  Thorax:  Lungs: There is mild nonspecific dependent change at the lung bases. Moderate streaky opacity is present at the visualized lung bases, consistent with atelectasis. No focal infiltrate or consolidation is seen.  Pleura: No effusions or thickening are seen. No pneumothorax is seen in the visualized lung bases.  Heart: Mild cardiomegaly is seen.  Abdomen:  Abdominal Wall: There is a subtle uncomplicated umbilical hernia which contains mesenteric fat.  Liver: The liver appears unremarkable.  Biliary System: No intrahepatic or extrahepatic biliary duct dilatation is seen.  Gallbladder: The gallbladder appears unremarkable.  Pancreas: Moderate pancreatic atrophy is seen.  Spleen: The spleen appears unremarkable.  Adrenals: The adrenal glands appear unremarkable.  Kidneys: The kidneys appear unremarkable with no stones cysts masses or hydronephrosis.  Aorta: There is moderate calcification of the abdominal aorta and its branches. There is an infrarenal abdominal supra iliac aortic aneurysm, measuring 4.3 cm in diameter.  IVC: Unremarkable.  Bowel:  Esophagus: There is a small hiatal hernia. There appears to be mild thickening versus underdistention of the distal esophagus. Correlate clinically as regards possible reflux esophagitis.  Stomach: The stomach appears unremarkable.  Duodenum: Unremarkable appearing duodenum.  Small Bowel: The small bowel appears unremarkable.  Colon: There is moderate stool in the colon which could reflect an element of constipation. Multiple diverticula are seen in the sigmoid colon. No associated inflammatory stranding is seen to suggest diverticulitis.  Appendix: The appendix is not identified but no inflammatory changes are seen in the right lower quadrant to suggest appendicitis.  Peritoneum: No intraperitoneal free air or ascites is seen.    Pelvis:  Bladder: The  bladder appears unremarkable.  Female:  Uterus: The uterus is not identified.  Ovaries: The ovaries are not identified. No adnexal masses are seen.    Bony structures:  Dorsal Spine: There is mild spondylosis of the visualized dorsal spine.  Bony Pelvis: The visualized bony structures of the pelvis appear unremarkable.      Impression:  1. There is an infrarenal abdominal supra iliac aortic aneurysm, measuring 4.3 cm in diameter. Correlate with clinical and laboratory findings as regards further evaluation and follow-up.  2. Details and other findings as discussed above.                          Preliminary result by Interface, Rad Results In (11/14/22 00:25:25)                   Narrative:    START OF REPORT:  Technique: CT of the abdomen and pelvis was performed with axial images as well as sagittal and coronal reconstruction images without intravenous contrast.    Comparison: None available.    Clinical History: Confusion, weakness, poss uti.    Dosage Information: Automated Exposure Control was utilized.    Findings:  Technical notes: Mild motion artifact is seen.  Lines and Tubes: None.  Thorax:  Lungs: There is mild nonspecific dependent change at the lung bases. Moderate streaky opacity is present at the visualized lung bases, consistent with atelectasis. No focal infiltrate or consolidation is seen.  Pleura: No effusions or thickening are seen. No pneumothorax is seen in the visualized lung bases.  Heart: Mild cardiomegaly is seen.  Abdomen:  Abdominal Wall: There is a subtle uncomplicated umbilical hernia which contains mesenteric fat.  Liver: The liver appears unremarkable.  Biliary System: No intrahepatic or extrahepatic biliary duct dilatation is seen.  Gallbladder: The gallbladder appears unremarkable.  Pancreas: Moderate pancreatic atrophy is seen.  Spleen: The spleen appears unremarkable.  Adrenals: The adrenal glands appear unremarkable.  Kidneys: The kidneys appear unremarkable with no stones cysts  masses or hydronephrosis.  Aorta: There is moderate calcification of the abdominal aorta and its branches. There is an infrarenal abdominal supra iliac aortic aneurysm, measuring 4.3 cm in diameter.  IVC: Unremarkable.  Bowel:  Esophagus: There is a small hiatal hernia. There appears to be mild thickening versus underdistention of the distal esophagus. Correlate clinically as regards possible reflux esophagitis.  Stomach: The stomach appears unremarkable.  Duodenum: Unremarkable appearing duodenum.  Small Bowel: The small bowel appears unremarkable.  Colon: There is moderate stool in the colon which could reflect an element of constipation. Multiple diverticula are seen in the sigmoid colon. No associated inflammatory stranding is seen to suggest diverticulitis.  Appendix: The appendix is not identified but no inflammatory changes are seen in the right lower quadrant to suggest appendicitis.  Peritoneum: No intraperitoneal free air or ascites is seen.    Pelvis:  Bladder: The bladder appears unremarkable.  Female:  Uterus: The uterus is not identified.  Ovaries: The ovaries are not identified. No adnexal masses are seen.    Bony structures:  Dorsal Spine: There is mild spondylosis of the visualized dorsal spine.  Bony Pelvis: The visualized bony structures of the pelvis appear unremarkable.      Impression:  1. There is an infrarenal abdominal supra iliac aortic aneurysm, measuring 4.3 cm in diameter. Correlate with clinical and laboratory findings as regards further evaluation and follow-up.  2. Details and other findings as discussed above.                                         X-Ray Chest AP Portable (In process)                      Medications - No data to display  Medical Decision Making:   Clinical Tests:   Lab Tests: Ordered and Reviewed  Radiological Study: Ordered and Reviewed        Scribe Attestation:   Scribe #1: I performed the above scribed service and the documentation accurately describes the  services I performed. I attest to the accuracy of the note.    Attending Attestation:           Physician Attestation for Scribe:  Physician Attestation Statement for Scribe #1: I, reviewed documentation, as scribed by Ronaldo Johnson in my presence, and it is both accurate and complete.           ED Course as of 11/14/22 0040 Mon Nov 14, 2022   0039 Patient denies any complaints at this time.  No back pain.  She denies any abdominal pain. [KG]      ED Course User Index  [KG] Yasmani Lewis MD                 Clinical Impression:   Final diagnoses:  [R53.1] Weakness  [M54.50] Acute left-sided low back pain without sciatica (Primary)        ED Disposition Condition    Discharge Stable          ED Prescriptions    None       Follow-up Information       Follow up With Specialties Details Why Contact Info    Cruz Almendarez MD Internal Medicine In 3 days  [address] [address]  [phone]      [facility] - Emergency Dept Emergency Medicine  As needed, If symptoms worsen [address] [id number] [phone]             Yasmani Lewis MD  11/14/22 0040

## 2022-11-15 LAB — BACTERIA UR CULT: NORMAL

## 2022-12-01 ENCOUNTER — OFFICE VISIT (OUTPATIENT)
Dept: FAMILY MEDICINE | Facility: CLINIC | Age: 82
End: 2022-12-01
Payer: MEDICARE

## 2022-12-01 VITALS
OXYGEN SATURATION: 98 % | TEMPERATURE: 98 F | RESPIRATION RATE: 20 BRPM | DIASTOLIC BLOOD PRESSURE: 80 MMHG | SYSTOLIC BLOOD PRESSURE: 154 MMHG | HEART RATE: 71 BPM | BODY MASS INDEX: 22.1 KG/M2 | HEIGHT: 64 IN | WEIGHT: 129.44 LBS

## 2022-12-01 DIAGNOSIS — R68.89 FORGETFULNESS: Primary | ICD-10-CM

## 2022-12-01 DIAGNOSIS — I10 HYPERTENSION, UNSPECIFIED TYPE: ICD-10-CM

## 2022-12-01 DIAGNOSIS — F03.90 DEMENTIA, UNSPECIFIED DEMENTIA SEVERITY, UNSPECIFIED DEMENTIA TYPE, UNSPECIFIED WHETHER BEHAVIORAL, PSYCHOTIC, OR MOOD DISTURBANCE OR ANXIETY: ICD-10-CM

## 2022-12-01 PROCEDURE — 99214 OFFICE O/P EST MOD 30 MIN: CPT | Mod: PBBFAC

## 2022-12-01 RX ORDER — LISINOPRIL 20 MG/1
20 TABLET ORAL DAILY
Qty: 90 TABLET | Refills: 3 | Status: ON HOLD | OUTPATIENT
Start: 2022-12-01 | End: 2023-05-25 | Stop reason: HOSPADM

## 2022-12-01 RX ORDER — MEMANTINE HYDROCHLORIDE 21 MG/1
21 CAPSULE, EXTENDED RELEASE ORAL DAILY
Qty: 30 EACH | Refills: 5 | Status: SHIPPED | OUTPATIENT
Start: 2022-12-29 | End: 2023-06-21 | Stop reason: SDUPTHER

## 2022-12-01 RX ORDER — MEMANTINE HYDROCHLORIDE 14 MG/1
14 CAPSULE, EXTENDED RELEASE ORAL DAILY
Qty: 14 CAPSULE | Refills: 0 | Status: SHIPPED | OUTPATIENT
Start: 2022-12-15 | End: 2022-12-29

## 2022-12-01 RX ORDER — MEMANTINE HYDROCHLORIDE 7 MG/1
7 CAPSULE, EXTENDED RELEASE ORAL DAILY
Qty: 14 CAPSULE | Refills: 0 | Status: SHIPPED | OUTPATIENT
Start: 2022-12-01 | End: 2022-12-15

## 2022-12-01 NOTE — PROGRESS NOTES
Subjective:       Patient ID: Guillermina Stewart is a 82 y.o. female.    Chief Complaint: Follow-up and Dementia    HPI   80yo female PMH osteoporosis, HTN, depression, hyperparathyroidism, hypercalcemia, HLD, memory deficit, tobacco use. Presents to the [facility] for follow up for forgetfulness. Discussed starting memantine at last visit, but family wanted to discuss prior to starting the new medication.     Today, no major new issues. Daughter reports increasing forgetfulness. No falls. States mood swings vary but have decreased in frequency.     Memory deficit:    - two different daughters raised concern over forgetfullness of relatives' names, location of things around her house; pt does not feel she has memory issues but does admit to forgetting where she places things, daughter at today's visit says forgetfulness worsening  - patient not oriented to birthdate or current year  - pt and daughter denied visual/auditory hallucinations, tremors, or gait disturbances  - reported mood swings by daughter, not as bad as before  -  passes away 15y ago; lives in [place] with her son  -  B12 elevated at 914, Folate WNL, Syphilis/HIV/Hep panel negative, LATOYA negative, RF IgA 1 IgG 3 IgM 0 , ESR 13 WNL, CRP 0.336 WNL, all 7/2021  - no side effects of donepezil 10mg qD    US thyroid 8/2021:   IMPRESSION  - Multiple bilateral thyroid nodules, none of which meet biopsy criteria secondary to size below threshold. Annual surveillance thyroid ultrasound recommended for the subcentimeter TI-RADS 5 nodules.  - Hypoechoic posterior right nodule may be extrathyroidal and could represent parathyroid gland, otherwise relatively indistinct and limited characterization. Additional assessment with nuclear medicine imaging and/or parathyroid 4D protocol CT recommended  - pt refused further work-up of of thyroid and parathyroidism; PCP doubts her decision-making capacity considering memory and mood issues. Has not followed up  "with Endocrinology regarding her hyperparathyroidism; per daughter visit is rescheduled in 8/2022      MRI Brain 2/2022:   - no restricted diffusion, hemorrhage or edema moderate scattered and patchy T2/FLAIR hyperintensities in the subcortical and periventricular white matter, with prior lacunar infarcts in the basal ganglia, thalami and leland.  - no mass effect or midline shift  - moderate parenchymal volume loss   - basal cisterns are patent  - no hydrocephalus or abnormal extra-axial fluid collection  - major intracranial flow voids are patent  - fluid layering in the left maxillary sinus.  IMPRESSION:  1.  No acute intracranial abnormality identified.  2.  Moderate chronic microvascular ischemic changes and parenchymal  volume loss.    SLUMS 5/30, pos screen for dementia as of 6/2022  Education level: 8th grade    Nidhi assessment:   No recent falls  Appetite is good  Sleep is good  Bowel/bladder normal and has no complaints  ADLs/iADLS - able to dress herself, needs assistance with bathing, independent of transferring, cleaning - son cooks but she is able to do so for herself, daughter manages finances but always has   No driving issues, no wrecks or tickets  Ambulates without issue  Vision is good  Memory similar to previous (daughter present for entire exam),   Pleasant mood at times of visit, daughter reports mood swings at times  Lives with sonJacob      Medications:   Centrum MV   Amlodipine 5mg qD   Prolia q6m   Donepezil 10mg   Lisinopril 20mg qD  Pravastatin 40mg qD     Review of Systems    Denies HA, dizziness, vision changes, edema, chest pain/congestion, palpitations, GI/ issues  Objective:      Physical Exam    Blood pressure (!) 150/78, pulse 71, temperature 97.7 °F (36.5 °C), temperature source Oral, resp. rate 20, height 5' 4.02" (1.626 m), weight 58.7 kg (129 lb 6.6 oz), SpO2 98 %.    General: no acute distress, accompanied by daughter   CVS: regular rate, 2+ radial pulses bilaterally, no " edema  Resp: effort normal, no respiratory distress on room air  GI: soft, non-tender, non-distended  Psych: appropriate and cooperative    MME as of 6/2022:  President - does know   Serial 2s - cannot   W-O-R-L-D - doesn't read well   5 word recall - 0/5    Assessment:       1. Forgetfulness    2. Dementia, unspecified dementia severity, unspecified dementia type, unspecified whether behavioral, psychotic, or mood disturbance or anxiety          Plan:     -  B12 elevated at 914, Folate WNL, Syphilis/HIV/Hep panel negative, LATOYA negative, RF IgA 1 IgG 3 IgM 0 , ESR 13 WNL, CRP 0.336 WNL, all 7/2021  - MRI brain, US thyroid  as above in HPI  - Discontinue donepezil  - Start Namenda XR 7 for 2 weeks, titrate every 2 weeks up to 21 mg daily    RTC in 3 months.    Cony Marinelli MD  [facility], HO-1  12/1/2022

## 2022-12-09 ENCOUNTER — TELEPHONE (OUTPATIENT)
Dept: INTERNAL MEDICINE | Facility: CLINIC | Age: 82
End: 2022-12-09
Payer: MEDICARE

## 2022-12-09 NOTE — TELEPHONE ENCOUNTER
----- Message from Cruz Almendarez MD sent at 12/9/2022 10:58 AM CST -----  Ultrasound shows thyroid nodules that were present on the prior ultrasound. Similar to the ultrasound last year, the nodules are small and do not require any further workup. We will repeat an ultrasound next year. Please inform the patient

## 2022-12-09 NOTE — TELEPHONE ENCOUNTER
Contacted patient ID and  verified by Leila, patient's daughter. Patient's daughter informed of thyroid u/s results and verbalized complete understanding.

## 2023-01-20 DIAGNOSIS — E78.5 HYPERLIPIDEMIA, UNSPECIFIED HYPERLIPIDEMIA TYPE: Primary | ICD-10-CM

## 2023-01-20 RX ORDER — PRAVASTATIN SODIUM 40 MG/1
40 TABLET ORAL DAILY
Qty: 90 TABLET | Refills: 1 | Status: SHIPPED | OUTPATIENT
Start: 2023-01-20 | End: 2023-06-22 | Stop reason: SDUPTHER

## 2023-01-30 ENCOUNTER — LAB VISIT (OUTPATIENT)
Dept: LAB | Facility: HOSPITAL | Age: 83
End: 2023-01-30
Attending: STUDENT IN AN ORGANIZED HEALTH CARE EDUCATION/TRAINING PROGRAM
Payer: MEDICARE

## 2023-01-30 DIAGNOSIS — E78.5 HYPERLIPIDEMIA, UNSPECIFIED HYPERLIPIDEMIA TYPE: ICD-10-CM

## 2023-01-30 DIAGNOSIS — Z72.0 TOBACCO USER: ICD-10-CM

## 2023-01-30 DIAGNOSIS — M81.0 OSTEOPOROSIS, UNSPECIFIED OSTEOPOROSIS TYPE, UNSPECIFIED PATHOLOGICAL FRACTURE PRESENCE: ICD-10-CM

## 2023-01-30 DIAGNOSIS — R73.03 PREDIABETES: ICD-10-CM

## 2023-01-30 DIAGNOSIS — E83.52 HYPERCALCEMIA: ICD-10-CM

## 2023-01-30 DIAGNOSIS — I10 HYPERTENSION, UNSPECIFIED TYPE: ICD-10-CM

## 2023-01-30 DIAGNOSIS — E04.1 THYROID NODULE: ICD-10-CM

## 2023-01-30 LAB
ALBUMIN SERPL-MCNC: 4.1 G/DL (ref 3.4–4.8)
ALBUMIN/GLOB SERPL: 1.1 RATIO (ref 1.1–2)
ALP SERPL-CCNC: 74 UNIT/L (ref 40–150)
ALT SERPL-CCNC: 10 UNIT/L (ref 0–55)
AST SERPL-CCNC: 17 UNIT/L (ref 5–34)
BASOPHILS # BLD AUTO: 0.04 X10(3)/MCL (ref 0–0.2)
BASOPHILS NFR BLD AUTO: 0.5 %
BILIRUBIN DIRECT+TOT PNL SERPL-MCNC: 0.9 MG/DL
BUN SERPL-MCNC: 17.8 MG/DL (ref 9.8–20.1)
CALCIUM SERPL-MCNC: 10 MG/DL (ref 8.4–10.2)
CHLORIDE SERPL-SCNC: 108 MMOL/L (ref 98–107)
CHOLEST SERPL-MCNC: 194 MG/DL
CHOLEST/HDLC SERPL: 4 {RATIO} (ref 0–5)
CO2 SERPL-SCNC: 27 MMOL/L (ref 23–31)
CREAT SERPL-MCNC: 0.83 MG/DL (ref 0.55–1.02)
DEPRECATED CALCIDIOL+CALCIFEROL SERPL-MC: 31.6 NG/ML (ref 30–80)
EOSINOPHIL # BLD AUTO: 0.14 X10(3)/MCL (ref 0–0.9)
EOSINOPHIL NFR BLD AUTO: 1.9 %
ERYTHROCYTE [DISTWIDTH] IN BLOOD BY AUTOMATED COUNT: 13.4 % (ref 11.5–17)
EST. AVERAGE GLUCOSE BLD GHB EST-MCNC: 116.9 MG/DL
GFR SERPLBLD CREATININE-BSD FMLA CKD-EPI: >60 MLS/MIN/1.73/M2
GLOBULIN SER-MCNC: 3.7 GM/DL (ref 2.4–3.5)
GLUCOSE SERPL-MCNC: 93 MG/DL (ref 82–115)
HBA1C MFR BLD: 5.7 %
HCT VFR BLD AUTO: 48.8 % (ref 37–47)
HDLC SERPL-MCNC: 54 MG/DL (ref 35–60)
HGB BLD-MCNC: 15.8 GM/DL (ref 12–16)
IMM GRANULOCYTES # BLD AUTO: 0.04 X10(3)/MCL (ref 0–0.04)
IMM GRANULOCYTES NFR BLD AUTO: 0.5 %
LDLC SERPL CALC-MCNC: 116 MG/DL (ref 50–140)
LYMPHOCYTES # BLD AUTO: 2.29 X10(3)/MCL (ref 0.6–4.6)
LYMPHOCYTES NFR BLD AUTO: 30.5 %
MCH RBC QN AUTO: 30.4 PG
MCHC RBC AUTO-ENTMCNC: 32.4 MG/DL (ref 33–36)
MCV RBC AUTO: 93.8 FL (ref 80–94)
MONOCYTES # BLD AUTO: 0.49 X10(3)/MCL (ref 0.1–1.3)
MONOCYTES NFR BLD AUTO: 6.5 %
NEUTROPHILS # BLD AUTO: 4.52 X10(3)/MCL (ref 2.1–9.2)
NEUTROPHILS NFR BLD AUTO: 60.1 %
NRBC BLD AUTO-RTO: 0 %
PLATELET # BLD AUTO: 216 X10(3)/MCL (ref 130–400)
PMV BLD AUTO: 12.4 FL (ref 7.4–10.4)
POTASSIUM SERPL-SCNC: 3.9 MMOL/L (ref 3.5–5.1)
PROT SERPL-MCNC: 7.8 GM/DL (ref 5.8–7.6)
RBC # BLD AUTO: 5.2 X10(6)/MCL (ref 4.2–5.4)
SODIUM SERPL-SCNC: 143 MMOL/L (ref 136–145)
TRIGL SERPL-MCNC: 120 MG/DL (ref 37–140)
TSH SERPL-ACNC: 1.34 UIU/ML (ref 0.35–4.94)
VLDLC SERPL CALC-MCNC: 24 MG/DL
WBC # SPEC AUTO: 7.5 X10(3)/MCL (ref 4.5–11.5)

## 2023-01-30 PROCEDURE — 80053 COMPREHEN METABOLIC PANEL: CPT

## 2023-01-30 PROCEDURE — 36415 COLL VENOUS BLD VENIPUNCTURE: CPT

## 2023-01-30 PROCEDURE — 80061 LIPID PANEL: CPT

## 2023-01-30 PROCEDURE — 82306 VITAMIN D 25 HYDROXY: CPT

## 2023-01-30 PROCEDURE — 83036 HEMOGLOBIN GLYCOSYLATED A1C: CPT

## 2023-01-30 PROCEDURE — 85025 COMPLETE CBC W/AUTO DIFF WBC: CPT

## 2023-01-30 PROCEDURE — 84443 ASSAY THYROID STIM HORMONE: CPT

## 2023-02-01 ENCOUNTER — APPOINTMENT (OUTPATIENT)
Dept: LAB | Facility: HOSPITAL | Age: 83
End: 2023-02-01
Attending: STUDENT IN AN ORGANIZED HEALTH CARE EDUCATION/TRAINING PROGRAM
Payer: MEDICAID

## 2023-02-01 DIAGNOSIS — I10 HYPERTENSION, UNSPECIFIED TYPE: ICD-10-CM

## 2023-02-01 LAB
APPEARANCE UR: CLEAR
BACTERIA #/AREA URNS AUTO: ABNORMAL /HPF
BILIRUB UR QL STRIP.AUTO: NEGATIVE MG/DL
COLOR UR AUTO: YELLOW
GLUCOSE UR QL STRIP.AUTO: NORMAL MG/DL
HYALINE CASTS #/AREA URNS LPF: ABNORMAL /LPF
KETONES UR QL STRIP.AUTO: NEGATIVE MG/DL
LEUKOCYTE ESTERASE UR QL STRIP.AUTO: 500 UNIT/L
MUCOUS THREADS URNS QL MICRO: ABNORMAL /LPF
NITRITE UR QL STRIP.AUTO: NEGATIVE
PH UR STRIP.AUTO: 7 [PH]
PROT UR QL STRIP.AUTO: ABNORMAL MG/DL
RBC #/AREA URNS AUTO: ABNORMAL /HPF
RBC UR QL AUTO: NEGATIVE UNIT/L
SP GR UR STRIP.AUTO: 1.02
SQUAMOUS #/AREA URNS LPF: ABNORMAL /HPF
UROBILINOGEN UR STRIP-ACNC: NORMAL MG/DL
WBC #/AREA URNS AUTO: ABNORMAL /HPF

## 2023-02-01 PROCEDURE — 81001 URINALYSIS AUTO W/SCOPE: CPT

## 2023-02-01 PROCEDURE — 87186 SC STD MICRODIL/AGAR DIL: CPT

## 2023-02-01 PROCEDURE — 87088 URINE BACTERIA CULTURE: CPT

## 2023-02-02 ENCOUNTER — OFFICE VISIT (OUTPATIENT)
Dept: INTERNAL MEDICINE | Facility: CLINIC | Age: 83
End: 2023-02-02
Payer: MEDICAID

## 2023-02-02 VITALS
WEIGHT: 128.38 LBS | BODY MASS INDEX: 21.92 KG/M2 | RESPIRATION RATE: 18 BRPM | HEART RATE: 80 BPM | DIASTOLIC BLOOD PRESSURE: 81 MMHG | OXYGEN SATURATION: 97 % | SYSTOLIC BLOOD PRESSURE: 149 MMHG | TEMPERATURE: 98 F | HEIGHT: 64 IN

## 2023-02-02 DIAGNOSIS — E83.52 HYPERCALCEMIA: ICD-10-CM

## 2023-02-02 DIAGNOSIS — E21.3 HYPERPARATHYROIDISM: ICD-10-CM

## 2023-02-02 DIAGNOSIS — E78.5 HYPERLIPIDEMIA, UNSPECIFIED HYPERLIPIDEMIA TYPE: ICD-10-CM

## 2023-02-02 DIAGNOSIS — F03.90 DEMENTIA, UNSPECIFIED DEMENTIA SEVERITY, UNSPECIFIED DEMENTIA TYPE, UNSPECIFIED WHETHER BEHAVIORAL, PSYCHOTIC, OR MOOD DISTURBANCE OR ANXIETY: Primary | ICD-10-CM

## 2023-02-02 DIAGNOSIS — I10 HYPERTENSION, UNSPECIFIED TYPE: ICD-10-CM

## 2023-02-02 DIAGNOSIS — M81.0 OSTEOPOROSIS, UNSPECIFIED OSTEOPOROSIS TYPE, UNSPECIFIED PATHOLOGICAL FRACTURE PRESENCE: ICD-10-CM

## 2023-02-02 PROCEDURE — 99213 OFFICE O/P EST LOW 20 MIN: CPT | Mod: PBBFAC

## 2023-02-02 RX ORDER — ASPIRIN 81 MG/1
81 TABLET ORAL DAILY
Qty: 90 TABLET | Refills: 1
Start: 2023-02-02 | End: 2023-06-22 | Stop reason: SDUPTHER

## 2023-02-02 RX ORDER — CEFDINIR 300 MG/1
300 CAPSULE ORAL 2 TIMES DAILY
COMMUNITY
Start: 2023-01-27 | End: 2023-03-30

## 2023-02-02 RX ORDER — MEMANTINE HYDROCHLORIDE 7 MG/1
7 CAPSULE, EXTENDED RELEASE ORAL
COMMUNITY
Start: 2022-12-01 | End: 2023-02-02

## 2023-02-02 NOTE — PROGRESS NOTES
INTERNAL MEDICINE RESIDENT CLINIC  CLINIC NOTE    Patient Name: Guillermina Stewart  YOB: 1940    PRESENTING HISTORY       History of Present Illness:  Ms. Guillermina Stewart is a 82 y.o. female who is coming in for a follow up visit. Today she is doing well. She continues to live with her son. Her two daughters are present today with her. They think she has been more forgetful. She stopped smoking cigarettes because she forgets to smoke. She has been talking less unless someone iniates talking to her first. She has less of a temper than before per her daughters. She eats OK and hasn't had any falls. She has not wandered out of the house or left the stove on. The daughters don't have any safety concerns. Of note a week-or-so ago, she was more confused than usual and was having dysuria, so they went to a walk-in clinic and she was diagnosed with a UTI. Was prescribed cefdinir which she's still taking. Dysuria has resolved, and confusion has resolved; she's back to baseline.    ROS  Constitutional: no fever/chills  EENT: no sore throat, ear pain, sinus pain/congestion, nasal congestion/drainage  Respiratory: no cough, no wheezing, no shortness of breath  Cardiovascular: no chest pain, no palpitations, no edema  Gastrointestinal: no nausea, vomiting, or diarrhea. No abdominal pain  Genitourinary: no dysuria, no urinary frequency or urgency, no hematuria  Integumentary: no skin rash or abnormal lesion  Neurologic: no headache, no dizziness, no weakness or numbness      PAST HISTORY:     Past Medical History:   Diagnosis Date    Hyperlipidemia     Hypertension        No past surgical history on file.    Family History   Family history unknown: Yes       Social History     Socioeconomic History    Marital status:     Number of children: 3   Occupational History    Occupation: retired   Tobacco Use    Smoking status: Former     Packs/day: 0.25     Years: 60.00     Pack years: 15.00     Types: Cigarettes     Smokeless tobacco: Never    Tobacco comments:     Quit smoking around 2022   Substance and Sexual Activity    Alcohol use: Not Currently    Drug use: Never    Sexual activity: Not Currently     Partners: Male     Social Determinants of Health     Financial Resource Strain: Low Risk     Difficulty of Paying Living Expenses: Not hard at all   Food Insecurity: No Food Insecurity    Worried About Running Out of Food in the Last Year: Never true    Ran Out of Food in the Last Year: Never true   Transportation Needs: No Transportation Needs    Lack of Transportation (Medical): No    Lack of Transportation (Non-Medical): No   Physical Activity: Inactive    Days of Exercise per Week: 0 days    Minutes of Exercise per Session: 0 min   Stress: No Stress Concern Present    Feeling of Stress : Not at all   Social Connections: Socially Isolated    Frequency of Communication with Friends and Family: More than three times a week    Frequency of Social Gatherings with Friends and Family: More than three times a week    Attends Latter day Services: Never    Active Member of Clubs or Organizations: No    Attends Club or Organization Meetings: Never    Marital Status:    Housing Stability: Low Risk     Unable to Pay for Housing in the Last Year: No    Number of Places Lived in the Last Year: 1    Unstable Housing in the Last Year: No       MEDICATIONS & ALLERGIES:     Current Outpatient Medications on File Prior to Visit   Medication Sig    cholecalciferol, vitamin D3, 10 mcg (400 unit) Cap capsule Take 1 capsule (400 Units total) by mouth once daily.    lisinopriL (PRINIVIL,ZESTRIL) 20 MG tablet Take 1 tablet (20 mg total) by mouth once daily.    memantine (NAMENDA XR) 21 mg CSpX Take 21 mg by mouth once daily.    pravastatin (PRAVACHOL) 40 MG tablet Take 1 tablet (40 mg total) by mouth once daily.    PROLIA 60 mg/mL Syrg SMARTSI Milliliter(s) SUB-Q Twice a Year    cefdinir (OMNICEF) 300 MG capsule Take 300 mg by  "mouth 2 (two) times daily.    mv-min-folic acid-lutein (CENTRUM SILVER) 400-250 mcg Chew Take 1 tablet by mouth once daily. (Patient not taking: Reported on 2/2/2023)    [DISCONTINUED] memantine (NAMENDA) 7 mg CSpX Take 7 mg by mouth.     No current facility-administered medications on file prior to visit.       Review of patient's allergies indicates:   Allergen Reactions    Propoxyphene     Tramadol Nausea And Vomiting       OBJECTIVE:   Vital Signs:  Vitals:    02/02/23 1459   BP: (!) 149/81   Pulse: 80   Resp: 18   Temp: 98.1 °F (36.7 °C)   SpO2: 97%   Weight: 58.2 kg (128 lb 6.4 oz)   Height: 5' 4" (1.626 m)       No results found for this or any previous visit (from the past 24 hour(s)).      Physical Exam    General - Appears comfortable, appropriatley conversive   Mental Status - alert and oriented to converstaion. Orientation to person, place, time not assessed  HEENT - no rhinorrhea   Cardiac - RRR, no murmurs, rubs, or gallops; no edema in LE   Respiratory - breathing comfortably; clear to ascultation bilaterally   Abdominal - nondistended, soft, nontender to palpation   Extremities - LE, UE, and joints are nonerythematous and nonswollen   Skin - no rashes or bruises seen on skin        Laboratory  Lab Results   Component Value Date    WBC 7.5 01/30/2023    HGB 15.8 01/30/2023    HCT 48.8 (H) 01/30/2023    MCV 93.8 01/30/2023     01/30/2023     @PLFMVHXFN34(GLU,NA,K,Cl,CO2,BUN,Creatinine,Calcium,MG)@  No results found for: INR, PROTIME  Lab Results   Component Value Date    HGBA1C 5.7 01/30/2023     No results for input(s): POCTGLUCOSE in the last 72 hours.        ASSESSMENT & PLAN:     UTI  -resolved. Complete course of cefdinir  -obtained urine culture yesterday, after she'd already been on Abx started by urgent care. Will monitor for signs of growth  -if symtpoms return, may try macrobid    Osteoporosis  -likely secondary to smoking history, age and history of hysterectomy; now also has " hyperparathyroidism  -Prolia injections started 7/2016, q6months. Continue  -continue Vit D 800U daily.   -repeat DEXA scan 2/2020, improved from DEXA 2016     Hyperparathyroidism  -has osteoporosis so meets criteria for surgery  -Endocrinology appointment scheduled for August 2023    TIRADS 5 thyroid nodules  -has TIRADS 5 subcentimeter nodules to small to biopsy-rec is for repeat US in 1 year.  -will get repeat US in 10/2023    Dementia  Concern for Alzheimer's  -continue memantine 21mg daily  -B12, Folate, Syphilis oracio, LATOYA, RF, ESR, CRP are normal  -Assessed medical decision-making capacity and she does not have capacity. Her oldest daughter, Patricia, would be the medical decision maker. Her number is in the chart  -continue following with Geriatrics clinic      HTN  -advised keep BP log  -low salt diet  -She is no longer taking norvasc  -continue lisinopril 20mg daily  -d/c'd HCTZ in past b/c hypercalcemia    Tobacco use  -is smokiing < 1/2 ppd  -not addressed this visit    Hyperlipidemia  -continue pravastatin 40mg    Abdominal Aortic Aneurysm  -Inferior abdominal aortic aneurysm 4.3 cm. - noted on CT scan 11/2022  -will get Abdominal US later this year to monitor    Health maintenance  -due for tdap, pneumovax, shingrix, flu - patient refusing vaccines  -doesn't need routine cancer screenings given age (mammogram, pap smear, low-dose CT).   -patient has >30 pack year smoking history    Other patient information  2021-lives in [place]. Her  passed away over 15 years ago. Grandson passed away. Used to arm-wrestle and likes to watch wrestling on TV. 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 She has 2 daughters and 1 son.  2022-currently lives with her son. She does drive and visit friends' houses.    RTC in 4 months    Cruz Almendarez MD  Internal Medicine PGY-3

## 2023-02-02 NOTE — PROGRESS NOTES
I have reviewed the notes, assessment, and management plan and discussed same performed by resident, I concur with the documentation of Guillermina Stewart.

## 2023-02-04 LAB — BACTERIA UR CULT: ABNORMAL

## 2023-05-12 ENCOUNTER — HOSPITAL ENCOUNTER (EMERGENCY)
Facility: HOSPITAL | Age: 83
Discharge: HOME | End: 2023-05-12
Attending: EMERGENCY MEDICINE
Payer: MEDICARE

## 2023-05-12 VITALS
TEMPERATURE: 98 F | RESPIRATION RATE: 18 BRPM | HEIGHT: 63 IN | HEART RATE: 70 BPM | BODY MASS INDEX: 23.83 KG/M2 | SYSTOLIC BLOOD PRESSURE: 155 MMHG | DIASTOLIC BLOOD PRESSURE: 88 MMHG | WEIGHT: 134.5 LBS | OXYGEN SATURATION: 98 %

## 2023-05-12 DIAGNOSIS — R53.1 WEAKNESS: ICD-10-CM

## 2023-05-12 DIAGNOSIS — N39.0 URINARY TRACT INFECTION WITHOUT HEMATURIA, SITE UNSPECIFIED: Primary | ICD-10-CM

## 2023-05-12 LAB
ALBUMIN SERPL-MCNC: 3.6 G/DL (ref 3.4–4.8)
ALBUMIN/GLOB SERPL: 1.1 RATIO (ref 1.1–2)
ALP SERPL-CCNC: 102 UNIT/L (ref 40–150)
ALT SERPL-CCNC: 12 UNIT/L (ref 0–55)
APPEARANCE UR: CLEAR
AST SERPL-CCNC: 13 UNIT/L (ref 5–34)
BACTERIA #/AREA URNS AUTO: ABNORMAL /HPF
BASOPHILS # BLD AUTO: 0.04 X10(3)/MCL
BASOPHILS NFR BLD AUTO: 0.4 %
BILIRUB UR QL STRIP.AUTO: NEGATIVE MG/DL
BILIRUBIN DIRECT+TOT PNL SERPL-MCNC: 0.7 MG/DL
BNP BLD-MCNC: 11.5 PG/ML
BUN SERPL-MCNC: 13.4 MG/DL (ref 9.8–20.1)
CALCIUM SERPL-MCNC: 10.3 MG/DL (ref 8.4–10.2)
CHLORIDE SERPL-SCNC: 107 MMOL/L (ref 98–107)
CO2 SERPL-SCNC: 25 MMOL/L (ref 23–31)
COLOR UR: ABNORMAL
CREAT SERPL-MCNC: 0.73 MG/DL (ref 0.55–1.02)
CRP SERPL-MCNC: 24.3 MG/L
EOSINOPHIL # BLD AUTO: 0.42 X10(3)/MCL (ref 0–0.9)
EOSINOPHIL NFR BLD AUTO: 4.2 %
ERYTHROCYTE [DISTWIDTH] IN BLOOD BY AUTOMATED COUNT: 13.3 % (ref 11.5–17)
FLUAV AG UPPER RESP QL IA.RAPID: NOT DETECTED
FLUBV AG UPPER RESP QL IA.RAPID: NOT DETECTED
GFR SERPLBLD CREATININE-BSD FMLA CKD-EPI: >60 MLS/MIN/1.73/M2
GLOBULIN SER-MCNC: 3.2 GM/DL (ref 2.4–3.5)
GLUCOSE SERPL-MCNC: 87 MG/DL (ref 82–115)
GLUCOSE UR QL STRIP.AUTO: NORMAL MG/DL
HCT VFR BLD AUTO: 40.4 % (ref 37–47)
HGB BLD-MCNC: 13.5 G/DL (ref 12–16)
HYALINE CASTS #/AREA URNS LPF: ABNORMAL /LPF
IMM GRANULOCYTES # BLD AUTO: 0.06 X10(3)/MCL (ref 0–0.04)
IMM GRANULOCYTES NFR BLD AUTO: 0.6 %
KETONES UR QL STRIP.AUTO: NEGATIVE MG/DL
LACTATE SERPL-SCNC: 0.9 MMOL/L (ref 0.5–2.2)
LEUKOCYTE ESTERASE UR QL STRIP.AUTO: 75 UNIT/L
LIPASE SERPL-CCNC: 20 U/L
LYMPHOCYTES # BLD AUTO: 1.51 X10(3)/MCL (ref 0.6–4.6)
LYMPHOCYTES NFR BLD AUTO: 15.3 %
MCH RBC QN AUTO: 31.6 PG (ref 27–31)
MCHC RBC AUTO-ENTMCNC: 33.4 G/DL (ref 33–36)
MCV RBC AUTO: 94.6 FL (ref 80–94)
MONOCYTES # BLD AUTO: 0.94 X10(3)/MCL (ref 0.1–1.3)
MONOCYTES NFR BLD AUTO: 9.5 %
MUCOUS THREADS URNS QL MICRO: ABNORMAL /LPF
NEUTROPHILS # BLD AUTO: 6.92 X10(3)/MCL (ref 2.1–9.2)
NEUTROPHILS NFR BLD AUTO: 70 %
NITRITE UR QL STRIP.AUTO: NEGATIVE
NRBC BLD AUTO-RTO: 0 %
PH UR STRIP.AUTO: 6.5 [PH]
PLATELET # BLD AUTO: 181 X10(3)/MCL (ref 130–400)
PLATELETS.RETICULATED NFR BLD AUTO: 10.5 % (ref 0.9–11.2)
PMV BLD AUTO: 13 FL (ref 7.4–10.4)
POTASSIUM SERPL-SCNC: 3.9 MMOL/L (ref 3.5–5.1)
PROT SERPL-MCNC: 6.8 GM/DL (ref 5.8–7.6)
PROT UR QL STRIP.AUTO: NEGATIVE MG/DL
RBC # BLD AUTO: 4.27 X10(6)/MCL (ref 4.2–5.4)
RBC #/AREA URNS AUTO: ABNORMAL /HPF
RBC UR QL AUTO: NEGATIVE UNIT/L
SARS-COV-2 RNA RESP QL NAA+PROBE: NOT DETECTED
SODIUM SERPL-SCNC: 140 MMOL/L (ref 136–145)
SP GR UR STRIP.AUTO: 1.01
SQUAMOUS #/AREA URNS LPF: ABNORMAL /HPF
TROPONIN I SERPL-MCNC: 0.01 NG/ML (ref 0–0.04)
UROBILINOGEN UR STRIP-ACNC: NORMAL MG/DL
WBC # SPEC AUTO: 9.89 X10(3)/MCL (ref 4.5–11.5)
WBC #/AREA URNS AUTO: ABNORMAL /HPF

## 2023-05-12 PROCEDURE — 86140 C-REACTIVE PROTEIN: CPT | Performed by: EMERGENCY MEDICINE

## 2023-05-12 PROCEDURE — 83690 ASSAY OF LIPASE: CPT | Performed by: EMERGENCY MEDICINE

## 2023-05-12 PROCEDURE — 80053 COMPREHEN METABOLIC PANEL: CPT | Performed by: EMERGENCY MEDICINE

## 2023-05-12 PROCEDURE — 85025 COMPLETE CBC W/AUTO DIFF WBC: CPT | Performed by: EMERGENCY MEDICINE

## 2023-05-12 PROCEDURE — 84484 ASSAY OF TROPONIN QUANT: CPT | Performed by: EMERGENCY MEDICINE

## 2023-05-12 PROCEDURE — 96361 HYDRATE IV INFUSION ADD-ON: CPT

## 2023-05-12 PROCEDURE — 99285 EMERGENCY DEPT VISIT HI MDM: CPT | Mod: 25

## 2023-05-12 PROCEDURE — 63600175 PHARM REV CODE 636 W HCPCS: Performed by: EMERGENCY MEDICINE

## 2023-05-12 PROCEDURE — 96365 THER/PROPH/DIAG IV INF INIT: CPT

## 2023-05-12 PROCEDURE — 83605 ASSAY OF LACTIC ACID: CPT | Performed by: EMERGENCY MEDICINE

## 2023-05-12 PROCEDURE — 25000003 PHARM REV CODE 250: Performed by: EMERGENCY MEDICINE

## 2023-05-12 PROCEDURE — 96372 THER/PROPH/DIAG INJ SC/IM: CPT | Performed by: EMERGENCY MEDICINE

## 2023-05-12 PROCEDURE — 81001 URINALYSIS AUTO W/SCOPE: CPT | Performed by: EMERGENCY MEDICINE

## 2023-05-12 PROCEDURE — 87040 BLOOD CULTURE FOR BACTERIA: CPT | Performed by: EMERGENCY MEDICINE

## 2023-05-12 PROCEDURE — 83880 ASSAY OF NATRIURETIC PEPTIDE: CPT | Performed by: EMERGENCY MEDICINE

## 2023-05-12 PROCEDURE — 93005 ELECTROCARDIOGRAM TRACING: CPT

## 2023-05-12 PROCEDURE — 0240U COVID/FLU A&B PCR: CPT | Performed by: EMERGENCY MEDICINE

## 2023-05-12 RX ORDER — DEXTROSE 40 %
30 GEL (GRAM) ORAL
Status: CANCELLED | OUTPATIENT
Start: 2023-05-12

## 2023-05-12 RX ORDER — SULFAMETHOXAZOLE AND TRIMETHOPRIM 800; 160 MG/1; MG/1
1 TABLET ORAL 2 TIMES DAILY
Qty: 14 TABLET | Refills: 0 | Status: ON HOLD | OUTPATIENT
Start: 2023-05-12 | End: 2023-05-25 | Stop reason: HOSPADM

## 2023-05-12 RX ORDER — DEXTROSE 40 %
15 GEL (GRAM) ORAL
Status: CANCELLED | OUTPATIENT
Start: 2023-05-12

## 2023-05-12 RX ORDER — NALOXONE HCL 0.4 MG/ML
0.02 VIAL (ML) INJECTION
Status: CANCELLED | OUTPATIENT
Start: 2023-05-12

## 2023-05-12 RX ORDER — ZIPRASIDONE MESYLATE 20 MG/ML
20 INJECTION, POWDER, LYOPHILIZED, FOR SOLUTION INTRAMUSCULAR
Status: COMPLETED | OUTPATIENT
Start: 2023-05-12 | End: 2023-05-12

## 2023-05-12 RX ORDER — GLUCAGON 1 MG
1 KIT INJECTION
Status: CANCELLED | OUTPATIENT
Start: 2023-05-12

## 2023-05-12 RX ORDER — SODIUM CHLORIDE 0.9 % (FLUSH) 0.9 %
10 SYRINGE (ML) INJECTION EVERY 12 HOURS PRN
Status: CANCELLED | OUTPATIENT
Start: 2023-05-12

## 2023-05-12 RX ORDER — ENOXAPARIN SODIUM 100 MG/ML
40 INJECTION SUBCUTANEOUS EVERY 24 HOURS
Status: CANCELLED | OUTPATIENT
Start: 2023-05-12

## 2023-05-12 RX ADMIN — SODIUM CHLORIDE 1830 ML: 9 INJECTION, SOLUTION INTRAVENOUS at 02:05

## 2023-05-12 RX ADMIN — CEFTRIAXONE SODIUM 1 G: 1 INJECTION, POWDER, FOR SOLUTION INTRAMUSCULAR; INTRAVENOUS at 05:05

## 2023-05-12 RX ADMIN — ZIPRASIDONE MESYLATE 20 MG: 20 INJECTION, POWDER, LYOPHILIZED, FOR SOLUTION INTRAMUSCULAR at 05:05

## 2023-05-12 NOTE — ED PROVIDER NOTES
Encounter Date: 5/12/2023       History     Chief Complaint   Patient presents with    Fatigue     Pt w recent dx of uti co gen weakness, dizziness and near fall x 3 today.  Pt denies CP/SOB.  NO FEVER. NO DYSURIA.  TAKING RX MEDS. VSS.  Ekg obtained.     Dizziness     Patient with baseline dementia limiting history; family observes weakness for which taken to clinic in [place] on 5/9 at which time diagnosed uti and started on nitrofurantoin. Symptoms progressive since then, family reports she is now stumbling and typically ambulates stably at baseline. Patient without nausea/vomiting but family endorsing poor po intake. Does appear clear that uti (if the cause) is not improving with po antibiotics.       Neurologic Problem  The primary symptoms include dizziness. Primary symptoms do not include headaches, loss of consciousness, altered mental status, seizures, visual change, paresthesias, focal weakness, loss of sensation, speech change, memory loss, nausea or vomiting. The symptoms began several days ago. The episode lasted 5 days. The symptoms are worsening. The neurological symptoms are diffuse.   She describes the dizziness as lightheadedness. The dizziness began 2 to 5 days ago. The dizziness has been gradually worsening since its onset. Associated with: diagnosed uti on 5/9, being treated with outpatient antibiotics ; Dizziness also occurs with weakness. Dizziness does not occur with blurred vision, tinnitus, hearing loss, nausea, vomiting or diaphoresis.   Additional symptoms include weakness. Additional symptoms do not include pain, lower back pain, leg pain, loss of balance or tinnitus. Medical issues do not include seizures or cerebral vascular accident. Workup history does not include MRI, CT scan or EEG.   Review of patient's allergies indicates:   Allergen Reactions    Propoxyphene     Tramadol Nausea And Vomiting     Past Medical History:   Diagnosis Date    HLD (hyperlipidemia)     HTN (hypertension)      Memory loss     Osteoporosis      Past Surgical History:   Procedure Laterality Date    hemorroidectomy      HYSTERECTOMY       Family History   Problem Relation Age of Onset    Anxiety disorder Father      Social History     Tobacco Use    Smoking status: Every Day     Packs/day: 0.50     Years: 60.00     Pack years: 30.00     Types: Cigarettes    Smokeless tobacco: Never   Substance Use Topics    Alcohol use: Not Currently    Drug use: Never     Review of Systems   Constitutional:  Negative for diaphoresis.   HENT:  Negative for tinnitus.    Eyes:  Negative for blurred vision.   Gastrointestinal:  Negative for nausea and vomiting.   Neurological:  Positive for dizziness and weakness. Negative for speech change, focal weakness, seizures, loss of consciousness, headaches, paresthesias and loss of balance.   Psychiatric/Behavioral:  Negative for memory loss.    All other systems reviewed and are negative.    Physical Exam     Initial Vitals [05/12/23 1308]   BP Pulse Resp Temp SpO2   (!) 145/80 64 18 98.8 °F (37.1 °C) 96 %      MAP       --         Physical Exam    Nursing note and vitals reviewed.  Constitutional: She appears well-developed and well-nourished. She is not diaphoretic. No distress.   HENT:   Head: Normocephalic and atraumatic.   Right Ear: External ear normal.   Left Ear: External ear normal.   Eyes: EOM are normal. Pupils are equal, round, and reactive to light. Right eye exhibits no discharge. Left eye exhibits no discharge.   Neck: Neck supple. No thyromegaly present. No tracheal deviation present.   Normal range of motion.  Cardiovascular:  Normal rate, regular rhythm, normal heart sounds and intact distal pulses.     Exam reveals no gallop and no friction rub.       No murmur heard.  Pulmonary/Chest: Breath sounds normal. No stridor. No respiratory distress. She has no wheezes. She has no rhonchi. She has no rales.   Abdominal: Abdomen is soft. Bowel sounds are normal. She exhibits no  distension. There is no abdominal tenderness. There is no rebound and no guarding.   Musculoskeletal:         General: No tenderness or edema. Normal range of motion.      Cervical back: Normal range of motion and neck supple.     Neurological: She is alert and oriented to person, place, and time. She has normal strength. No cranial nerve deficit or sensory deficit. GCS score is 15. GCS eye subscore is 4. GCS verbal subscore is 5. GCS motor subscore is 6.   Skin: Skin is warm and dry. Capillary refill takes less than 2 seconds. No rash and no abscess noted. No erythema. No pallor.   Psychiatric: She has a normal mood and affect. Her behavior is normal. Judgment and thought content normal.       ED Course   Procedures  Labs Reviewed   COMPREHENSIVE METABOLIC PANEL - Abnormal; Notable for the following components:       Result Value    Calcium Level Total 10.3 (*)     All other components within normal limits   URINALYSIS, REFLEX TO URINE CULTURE - Abnormal; Notable for the following components:    Leukocyte Esterase, UA 75 (*)     WBC, UA 6-10 (*)     Bacteria, UA Trace (*)     Squamous Epithelial Cells, UA Trace (*)     Mucous, UA Trace (*)     All other components within normal limits   C-REACTIVE PROTEIN - Abnormal; Notable for the following components:    C-Reactive Protein 24.30 (*)     All other components within normal limits   CBC WITH DIFFERENTIAL - Abnormal; Notable for the following components:    MCV 94.6 (*)     MCH 31.6 (*)     MPV 13.0 (*)     IG# 0.06 (*)     All other components within normal limits   LIPASE - Normal   LACTIC ACID, PLASMA - Normal   TROPONIN I - Normal   B-TYPE NATRIURETIC PEPTIDE - Normal   COVID/FLU A&B PCR - Normal    Narrative:     The Xpert Xpress SARS-CoV-2/FLU/RSV plus is a rapid, multiplexed real-time PCR test intended for the simultaneous qualitative detection and differentiation of SARS-CoV-2, Influenza A, Influenza B, and respiratory syncytial virus (RSV) viral RNA in  either nasopharyngeal swab or nasal swab specimens.         BLOOD CULTURE [facility]   BLOOD CULTURE [facility]   CBC W/ AUTO DIFFERENTIAL    Narrative:     The following orders were created for panel order CBC auto differential.  Procedure                               Abnormality         Status                     ---------                               -----------         ------                     CBC with Differential[[id number]]        Abnormal            Final result                 Please view results for these tests on the individual orders.   EXTRA TUBES    Narrative:     The following orders were created for panel order EXTRA TUBES.  Procedure                               Abnormality         Status                     ---------                               -----------         ------                     Light Blue Top Hold[[id number]]                              In process                 Gold Top Hold[[id number]]                                    In process                 Pink Top Hold[[id number]]                                    In process                   Please view results for these tests on the individual orders.   LIGHT BLUE TOP HOLD   GOLD TOP HOLD   PINK TOP HOLD     EKG Readings: (Independently Interpreted)   NSR @ 76, non acute and non ischemic appearing ;   ECG Results              EKG 12-lead (Weakness) Age > 50 (Final result)  Result time 05/12/23 14:54:57      Final result by Interface, Lab In [facility] (05/12/23 14:54:57)                   Narrative:    Test Reason : R53.1,    Vent. Rate : 076 BPM     Atrial Rate : 076 BPM     P-R Int : 174 ms          QRS Dur : 080 ms      QT Int : 410 ms       P-R-T Axes : 054 042 033 degrees     QTc Int : 461 ms    Normal sinus rhythm  Confirmed by Bret PASTOR, Flaquito ([id number]) on 5/12/2023 2:54:45 PM    Referred By:             Confirmed By:Flaquito Queen MD                      Wet Read by Stephen Tobias MD (05/12/23 14:50:56, [facility],  Emergency Medicine)    NSR @ 76, non acute and non ischemic appearing ;                                  Imaging Results              CT Head Without Contrast (Final result)  Result time 05/12/23 15:34:34      Final result by Rosmery Cardozo MD (05/12/23 15:34:34)                   Impression:      No appreciable acute intracranial abnormality.    Periventricular and subcortical white matter changes most compatible with chronic small vessel ischemic disease.      Electronically signed by: Rosmery Cardozo  Date:    05/12/2023  Time:    15:34               Narrative:    EXAMINATION:  CT HEAD WITHOUT CONTRAST    CLINICAL HISTORY:  Mental status change, unknown cause;    TECHNIQUE:  Low dose axial CT images obtained throughout the head without intravenous contrast.  Axial, sagittal and coronal reconstructions were performed and interpreted.    DLP: 2672 mGycm    All CT scans at this location are performed using dose optimization techniques as appropriate to a performed exam including the following automated exposure control, adjustment of the mA and/or kV according to patient size and/or use of iterative reconstruction technique    COMPARISON:  CT head 08/16/2021    FINDINGS:  BRAIN: Gray white differentiation is maintained. Unchanged hypodensity at the right internal capsule/basal ganglia.  Periventricular and subcortical white matter changes most compatible with chronic small vessel ischemic disease.  Moderate cerebral atrophy.  No hemorrhage. No edema. No mass effect or midline shift.  Empty sella..    VENTRICLES: Unchanged symmetric choroid plexus calcifications.  No ventriculomegaly.    EXTRA-AXIAL: Expansion of the extra-axial spaces related to cerebral atrophy.  No appreciable hematoma.    BONES: Calvarium is intact.    SINUSES AND MASTOIDS: Visualized paranasal sinuses and mastoid air cells are clear.                                       X-Ray Chest AP Portable (Final result)  Result time 05/12/23  15:27:38      Final result by Rosmery Cardozo MD (05/12/23 15:27:38)                   Impression:      No acute cardiopulmonary abnormality.      Electronically signed by: Rosmery Cardozo  Date:    05/12/2023  Time:    15:27               Narrative:    EXAMINATION:  XR CHEST AP PORTABLE    CLINICAL HISTORY:  Weakness    TECHNIQUE:  Single frontal view of the chest was performed.    COMPARISON:  11/13/2022    FINDINGS:  LINES AND TUBES: EKG/telemetry leads overlie the chest.    MEDIASTINUM AND SILVIA: The cardiac silhouette is normal. Aortic atherosclerosis.    LUNGS: Mild basilar scarring.    PLEURA:No pleural effusion. No pneumothorax.    BONES: No acute osseous abnormality.                        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); no acute focal abnormal ;                                  X-Rays:   Independently Interpreted Readings:   Chest X-Ray: Compatible with fibrosis in known long term smoking history; no acute focal abnormal ;   Head CT: No appreciable acute intracranial abnormality   Medications   sodium chloride 0.9% bolus 1,830 mL 1,830 mL (0 mLs Intravenous Stopped 5/12/23 1639)   cefTRIAXone (ROCEPHIN) 1 g in dextrose 5 % in water (D5W) 5 % 50 mL IVPB (MB+) (0 g Intravenous Stopped 5/12/23 1824)   ziprasidone injection 20 mg (20 mg Intramuscular Given 5/12/23 1721)     Medical Decision Making:   History:   Old Medical Records: I decided to obtain old medical records.  Old Records Summarized: other records.       <> Summary of Records: Compatible with history as provided by patient  Initial Assessment:   Presents with attentive family members and features most compatible with uti, failing out patient mgt with po antibiotics  Differential Diagnosis:   Uti / urosepsis, frances, nstemi, cva/tia, others  Clinical Tests:   Lab Tests: Ordered and  Reviewed  Radiological Study: Ordered and Reviewed  Medical Tests: Ordered and Reviewed  ED Management:  Stable in the ED. Risk found sufficient to warrant expanded evaluation with objective data. The data are found compatible with uti, not responsive with po antibiotics. Did opt to consult medicine team who agree admission could benefit patient and extensive discussion with family and patient who does strongly prefer an out patient course. Given no overt features concerning for sepsis and deterioration find the request appropriate. Do plan change to an alternative po medicine. Have dosed patient with iv antibiotics in the ED. Home in stable condition with return precautions, anticipatory guidance, return precautions.            ED Course as of 05/12/23 1838   Fri May 12, 2023   1450 Negative lactate ; [CT]   1503 Reassuring hemogram ; [CT]   1520 Negative bnp ; [CT]   1520 Negative troponin ; [CT]   1523 Reassuring chemistries ; [CT]   1523 Quite elevated CRP ; [CT]   1524 Negative lipase ; [CT]   1639 Ua compatible with uti ; [CT]   1639 Respiratory pathogens negative by pcr ; [CT]      ED Course User Index  [CT] Stephen Tobias MD                   Clinical Impression:   Final diagnoses:  [R53.1] Weakness  [N39.0] Urinary tract infection without hematuria, site unspecified (Primary)        ED Disposition Condition    Discharge Stable          ED Prescriptions       Medication Sig Dispense Start Date End Date Auth. Provider    sulfamethoxazole-trimethoprim 800-160mg (BACTRIM DS) 800-160 mg Tab Take 1 tablet by mouth 2 (two) times daily. for 7 days 14 tablet 5/12/2023 5/19/2023 Stephen Tobias MD          Follow-up Information       Follow up With Specialties Details Why Contact Info    [facility] Emergency Medicine  As needed, If symptoms worsen [address] [facility] [address] [phone]             Stephen Tobias MD  05/12/23 1838

## 2023-05-17 LAB
BACTERIA BLD CULT: NORMAL
BACTERIA BLD CULT: NORMAL

## 2023-05-18 ENCOUNTER — HOSPITAL ENCOUNTER (INPATIENT)
Facility: HOSPITAL | Age: 83
LOS: 6 days | Discharge: HOME HEALTH | DRG: 194 | End: 2023-05-25
Attending: EMERGENCY MEDICINE | Admitting: INTERNAL MEDICINE
Payer: MEDICARE

## 2023-05-18 DIAGNOSIS — J18.9 PNEUMONIA OF RIGHT LOWER LOBE DUE TO INFECTIOUS ORGANISM: ICD-10-CM

## 2023-05-18 DIAGNOSIS — K59.00 CONSTIPATION, UNSPECIFIED CONSTIPATION TYPE: ICD-10-CM

## 2023-05-18 DIAGNOSIS — J18.9 CAP (COMMUNITY ACQUIRED PNEUMONIA): Primary | ICD-10-CM

## 2023-05-18 DIAGNOSIS — R41.82 ALTERED MENTAL STATUS, UNSPECIFIED ALTERED MENTAL STATUS TYPE: ICD-10-CM

## 2023-05-18 DIAGNOSIS — R41.0 DELIRIUM: ICD-10-CM

## 2023-05-18 DIAGNOSIS — F03.90 DEMENTIA, UNSPECIFIED DEMENTIA SEVERITY, UNSPECIFIED DEMENTIA TYPE, UNSPECIFIED WHETHER BEHAVIORAL, PSYCHOTIC, OR MOOD DISTURBANCE OR ANXIETY: ICD-10-CM

## 2023-05-18 DIAGNOSIS — R07.9 CHEST PAIN: ICD-10-CM

## 2023-05-18 LAB
ALBUMIN SERPL-MCNC: 3.7 G/DL (ref 3.4–4.8)
ALBUMIN/GLOB SERPL: 1 RATIO (ref 1.1–2)
ALP SERPL-CCNC: 118 UNIT/L (ref 40–150)
ALT SERPL-CCNC: 11 UNIT/L (ref 0–55)
APPEARANCE UR: CLEAR
AST SERPL-CCNC: 22 UNIT/L (ref 5–34)
BACTERIA #/AREA URNS AUTO: NORMAL /HPF
BASOPHILS # BLD AUTO: 0.03 X10(3)/MCL
BASOPHILS NFR BLD AUTO: 0.2 %
BILIRUB UR QL STRIP.AUTO: NEGATIVE MG/DL
BILIRUBIN DIRECT+TOT PNL SERPL-MCNC: 0.6 MG/DL
BUN SERPL-MCNC: 10.9 MG/DL (ref 9.8–20.1)
CALCIUM SERPL-MCNC: 10.5 MG/DL (ref 8.4–10.2)
CHLORIDE SERPL-SCNC: 102 MMOL/L (ref 98–107)
CO2 SERPL-SCNC: 23 MMOL/L (ref 23–31)
COLOR UR: YELLOW
CREAT SERPL-MCNC: 1.07 MG/DL (ref 0.55–1.02)
EOSINOPHIL # BLD AUTO: 0.03 X10(3)/MCL (ref 0–0.9)
EOSINOPHIL NFR BLD AUTO: 0.2 %
ERYTHROCYTE [DISTWIDTH] IN BLOOD BY AUTOMATED COUNT: 13.8 % (ref 11.5–17)
FLUAV AG UPPER RESP QL IA.RAPID: NOT DETECTED
FLUBV AG UPPER RESP QL IA.RAPID: NOT DETECTED
GFR SERPLBLD CREATININE-BSD FMLA CKD-EPI: 52 MLS/MIN/1.73/M2
GLOBULIN SER-MCNC: 3.7 GM/DL (ref 2.4–3.5)
GLUCOSE SERPL-MCNC: 111 MG/DL (ref 82–115)
GLUCOSE UR QL STRIP.AUTO: NEGATIVE MG/DL
HCT VFR BLD AUTO: 39.8 % (ref 37–47)
HGB BLD-MCNC: 13.3 G/DL (ref 12–16)
IMM GRANULOCYTES # BLD AUTO: 0.16 X10(3)/MCL (ref 0–0.04)
IMM GRANULOCYTES NFR BLD AUTO: 1 %
KETONES UR QL STRIP.AUTO: NEGATIVE MG/DL
LACTATE SERPL-SCNC: 1.7 MMOL/L (ref 0.5–2.2)
LEUKOCYTE ESTERASE UR QL STRIP.AUTO: NEGATIVE UNIT/L
LYMPHOCYTES # BLD AUTO: 2.64 X10(3)/MCL (ref 0.6–4.6)
LYMPHOCYTES NFR BLD AUTO: 16.7 %
MCH RBC QN AUTO: 32.3 PG (ref 27–31)
MCHC RBC AUTO-ENTMCNC: 33.4 G/DL (ref 33–36)
MCV RBC AUTO: 96.6 FL (ref 80–94)
MONOCYTES # BLD AUTO: 1.7 X10(3)/MCL (ref 0.1–1.3)
MONOCYTES NFR BLD AUTO: 10.8 %
NEUTROPHILS # BLD AUTO: 11.25 X10(3)/MCL (ref 2.1–9.2)
NEUTROPHILS NFR BLD AUTO: 71.1 %
NITRITE UR QL STRIP.AUTO: NEGATIVE
NRBC BLD AUTO-RTO: 0 %
PH UR STRIP.AUTO: 7.5 [PH]
PLATELET # BLD AUTO: 216 X10(3)/MCL (ref 130–400)
PMV BLD AUTO: 12.9 FL (ref 7.4–10.4)
POTASSIUM SERPL-SCNC: 4.8 MMOL/L (ref 3.5–5.1)
PROT SERPL-MCNC: 7.4 GM/DL (ref 5.8–7.6)
PROT UR QL STRIP.AUTO: NEGATIVE MG/DL
RBC # BLD AUTO: 4.12 X10(6)/MCL (ref 4.2–5.4)
RBC #/AREA URNS AUTO: <5 /HPF
RBC UR QL AUTO: NEGATIVE UNIT/L
RSV A 5' UTR RNA NPH QL NAA+PROBE: NOT DETECTED
SARS-COV-2 RNA RESP QL NAA+PROBE: NOT DETECTED
SODIUM SERPL-SCNC: 137 MMOL/L (ref 136–145)
SP GR UR STRIP.AUTO: 1.01 (ref 1–1.03)
SQUAMOUS #/AREA URNS AUTO: <5 /HPF
UROBILINOGEN UR STRIP-ACNC: 1 MG/DL
WBC # SPEC AUTO: 15.81 X10(3)/MCL (ref 4.5–11.5)
WBC #/AREA URNS AUTO: <5 /HPF

## 2023-05-18 PROCEDURE — 96372 THER/PROPH/DIAG INJ SC/IM: CPT | Performed by: INTERNAL MEDICINE

## 2023-05-18 PROCEDURE — 96366 THER/PROPH/DIAG IV INF ADDON: CPT

## 2023-05-18 PROCEDURE — 25000003 PHARM REV CODE 250: Performed by: EMERGENCY MEDICINE

## 2023-05-18 PROCEDURE — 85025 COMPLETE CBC W/AUTO DIFF WBC: CPT | Performed by: NURSE PRACTITIONER

## 2023-05-18 PROCEDURE — G0378 HOSPITAL OBSERVATION PER HR: HCPCS

## 2023-05-18 PROCEDURE — 63600175 PHARM REV CODE 636 W HCPCS: Performed by: NURSE PRACTITIONER

## 2023-05-18 PROCEDURE — 25000003 PHARM REV CODE 250: Performed by: INTERNAL MEDICINE

## 2023-05-18 PROCEDURE — 87798 DETECT AGENT NOS DNA AMP: CPT | Performed by: INTERNAL MEDICINE

## 2023-05-18 PROCEDURE — 87040 BLOOD CULTURE FOR BACTERIA: CPT | Performed by: INTERNAL MEDICINE

## 2023-05-18 PROCEDURE — 80053 COMPREHEN METABOLIC PANEL: CPT | Performed by: NURSE PRACTITIONER

## 2023-05-18 PROCEDURE — 96375 TX/PRO/DX INJ NEW DRUG ADDON: CPT

## 2023-05-18 PROCEDURE — 96365 THER/PROPH/DIAG IV INF INIT: CPT

## 2023-05-18 PROCEDURE — 83605 ASSAY OF LACTIC ACID: CPT | Performed by: EMERGENCY MEDICINE

## 2023-05-18 PROCEDURE — S5010 5% DEXTROSE AND 0.45% SALINE: HCPCS | Performed by: INTERNAL MEDICINE

## 2023-05-18 PROCEDURE — 25000003 PHARM REV CODE 250: Performed by: NURSE PRACTITIONER

## 2023-05-18 PROCEDURE — 0241U COVID/RSV/FLU A&B PCR: CPT | Performed by: INTERNAL MEDICINE

## 2023-05-18 PROCEDURE — 81001 URINALYSIS AUTO W/SCOPE: CPT | Performed by: NURSE PRACTITIONER

## 2023-05-18 PROCEDURE — 96361 HYDRATE IV INFUSION ADD-ON: CPT

## 2023-05-18 PROCEDURE — 63600175 PHARM REV CODE 636 W HCPCS: Performed by: INTERNAL MEDICINE

## 2023-05-18 PROCEDURE — 96367 TX/PROPH/DG ADDL SEQ IV INF: CPT

## 2023-05-18 PROCEDURE — 99285 EMERGENCY DEPT VISIT HI MDM: CPT | Mod: 25

## 2023-05-18 RX ORDER — ACETAMINOPHEN 500 MG
1000 TABLET ORAL EVERY 6 HOURS PRN
Status: DISCONTINUED | OUTPATIENT
Start: 2023-05-18 | End: 2023-05-25 | Stop reason: HOSPADM

## 2023-05-18 RX ORDER — ENOXAPARIN SODIUM 100 MG/ML
40 INJECTION SUBCUTANEOUS EVERY 24 HOURS
Status: DISCONTINUED | OUTPATIENT
Start: 2023-05-19 | End: 2023-05-25 | Stop reason: HOSPADM

## 2023-05-18 RX ORDER — GUAIFENESIN/DEXTROMETHORPHAN 100-10MG/5
5 SYRUP ORAL EVERY 4 HOURS PRN
Status: DISCONTINUED | OUTPATIENT
Start: 2023-05-18 | End: 2023-05-25 | Stop reason: HOSPADM

## 2023-05-18 RX ORDER — TALC
6 POWDER (GRAM) TOPICAL NIGHTLY PRN
Status: DISCONTINUED | OUTPATIENT
Start: 2023-05-18 | End: 2023-05-25 | Stop reason: HOSPADM

## 2023-05-18 RX ORDER — MAG HYDROX/ALUMINUM HYD/SIMETH 200-200-20
30 SUSPENSION, ORAL (FINAL DOSE FORM) ORAL 4 TIMES DAILY PRN
Status: DISCONTINUED | OUTPATIENT
Start: 2023-05-18 | End: 2023-05-25 | Stop reason: HOSPADM

## 2023-05-18 RX ORDER — LISINOPRIL 20 MG/1
20 TABLET ORAL DAILY
Status: DISCONTINUED | OUTPATIENT
Start: 2023-05-19 | End: 2023-05-21

## 2023-05-18 RX ORDER — BENZONATATE 100 MG/1
200 CAPSULE ORAL 3 TIMES DAILY PRN
Status: DISCONTINUED | OUTPATIENT
Start: 2023-05-18 | End: 2023-05-25 | Stop reason: HOSPADM

## 2023-05-18 RX ORDER — POLYETHYLENE GLYCOL 3350 17 G/17G
17 POWDER, FOR SOLUTION ORAL 2 TIMES DAILY PRN
Status: DISCONTINUED | OUTPATIENT
Start: 2023-05-18 | End: 2023-05-20

## 2023-05-18 RX ORDER — ASPIRIN 81 MG/1
81 TABLET ORAL DAILY
Status: DISCONTINUED | OUTPATIENT
Start: 2023-05-19 | End: 2023-05-25 | Stop reason: HOSPADM

## 2023-05-18 RX ORDER — PRAVASTATIN SODIUM 40 MG/1
40 TABLET ORAL DAILY
Status: DISCONTINUED | OUTPATIENT
Start: 2023-05-19 | End: 2023-05-25 | Stop reason: HOSPADM

## 2023-05-18 RX ORDER — ACETAMINOPHEN 325 MG/1
650 TABLET ORAL EVERY 4 HOURS PRN
Status: DISCONTINUED | OUTPATIENT
Start: 2023-05-18 | End: 2023-05-25 | Stop reason: HOSPADM

## 2023-05-18 RX ORDER — MEMANTINE HYDROCHLORIDE 5 MG/1
10 TABLET ORAL 2 TIMES DAILY
Status: DISCONTINUED | OUTPATIENT
Start: 2023-05-18 | End: 2023-05-25 | Stop reason: HOSPADM

## 2023-05-18 RX ORDER — DEXTROSE MONOHYDRATE AND SODIUM CHLORIDE 5; .45 G/100ML; G/100ML
INJECTION, SOLUTION INTRAVENOUS CONTINUOUS
Status: ACTIVE | OUTPATIENT
Start: 2023-05-18 | End: 2023-05-19

## 2023-05-18 RX ORDER — HALOPERIDOL 5 MG/ML
5 INJECTION INTRAMUSCULAR ONCE
Status: COMPLETED | OUTPATIENT
Start: 2023-05-18 | End: 2023-05-18

## 2023-05-18 RX ORDER — AMOXICILLIN 250 MG
2 CAPSULE ORAL 2 TIMES DAILY PRN
Status: DISCONTINUED | OUTPATIENT
Start: 2023-05-18 | End: 2023-05-20

## 2023-05-18 RX ORDER — DIPHENHYDRAMINE HYDROCHLORIDE 50 MG/ML
25 INJECTION INTRAMUSCULAR; INTRAVENOUS ONCE
Status: COMPLETED | OUTPATIENT
Start: 2023-05-18 | End: 2023-05-18

## 2023-05-18 RX ORDER — SODIUM CHLORIDE 0.9 % (FLUSH) 0.9 %
10 SYRINGE (ML) INJECTION
Status: DISCONTINUED | OUTPATIENT
Start: 2023-05-18 | End: 2023-05-25 | Stop reason: HOSPADM

## 2023-05-18 RX ORDER — ONDANSETRON 2 MG/ML
4 INJECTION INTRAMUSCULAR; INTRAVENOUS EVERY 4 HOURS PRN
Status: DISCONTINUED | OUTPATIENT
Start: 2023-05-18 | End: 2023-05-25 | Stop reason: HOSPADM

## 2023-05-18 RX ADMIN — SODIUM CHLORIDE 500 ML: 9 INJECTION, SOLUTION INTRAVENOUS at 03:05

## 2023-05-18 RX ADMIN — MEMANTINE 10 MG: 5 TABLET ORAL at 09:05

## 2023-05-18 RX ADMIN — CEFTRIAXONE SODIUM 1 G: 1 INJECTION, POWDER, FOR SOLUTION INTRAMUSCULAR; INTRAVENOUS at 01:05

## 2023-05-18 RX ADMIN — DIPHENHYDRAMINE HYDROCHLORIDE 25 MG: 50 INJECTION INTRAMUSCULAR; INTRAVENOUS at 09:05

## 2023-05-18 RX ADMIN — AZITHROMYCIN MONOHYDRATE 500 MG: 500 INJECTION, POWDER, LYOPHILIZED, FOR SOLUTION INTRAVENOUS at 09:05

## 2023-05-18 RX ADMIN — HALOPERIDOL LACTATE 5 MG: 5 INJECTION, SOLUTION INTRAMUSCULAR at 09:05

## 2023-05-18 RX ADMIN — DEXTROSE AND SODIUM CHLORIDE: 5; 450 INJECTION, SOLUTION INTRAVENOUS at 09:05

## 2023-05-18 NOTE — FIRST PROVIDER EVALUATION
"Medical screening examination initiated.  I have conducted a focused provider triage encounter, findings are as follows:    Brief history of present illness:  82-year-old female presents to ER with family at bedside reporting she was initially seen at a clinic in [place] on 05/09/2023 and was prescribed Macrobid for her UTI.  Reports symptoms of weakness and altered mental status worsened then she was seen at the hospital on 05/12/2023 in which she was discharged with Bactrim since patient did not want to have an inpatient stay overnight.  Again today, patient presents with progressive symptoms of altered mental status and urinated on herself today per family for further evaluation treatment of her symptoms.     Vitals:    05/18/23 1312   BP: (!) 106/55   Pulse: 90   Resp: 18   Temp: 97.2 °F (36.2 °C)   TempSrc: Oral   SpO2: 95%   Weight: 58.1 kg (128 lb)   Height: 5' 6" (1.676 m)       Pertinent physical exam:  Awake    Brief workup plan:  labs and UA    Preliminary workup initiated; this workup will be continued and followed by the physician or advanced practice provider that is assigned to the patient when roomed.  "

## 2023-05-18 NOTE — ED PROVIDER NOTES
Encounter Date: 5/18/2023    SCRIBE #1 NOTE: I, Trudi Nixon, am scribing for, and in the presence of,  Yasmani Lewis MD. I have scribed the following portions of the note - Other sections scribed: HPI,ROS,PE.     History     Chief Complaint   Patient presents with    Altered Mental Status     Pt to ER via POV for AMS and urinary incontinence. Was seen at [facility] 5/12 and dx with UTI and refused admission.       Mrs. Stewart is a 82-year-old female with past medical history of HTN and HLD presenting to ED with daughter sitting nearby c/o AMS and worsening UTI symptoms. Daughter also reports of urinary incontinence and difficulty walking secondary to generalized weakness.  Pt reportedly initially seen at a clinic in [place] on 05/09/2023 and was prescribed Macrobid for her UTI. Due to worsening symptoms, pt was seen at an outpatient hospital on 05/12/2023 in which she was discharged with Bactrim, Daughter states without relief. Notes upcoming appointment with PCP on Monday.     The history is provided by the patient and a caregiver. No  was used.   Altered Mental Status  This is a new problem. The current episode started more than 1 week ago. The problem occurs constantly. The problem has not changed since onset.Associated symptoms include abdominal pain. The treatment provided no relief.   Review of patient's allergies indicates:   Allergen Reactions    Propoxyphene     Tramadol Nausea And Vomiting     Past Medical History:   Diagnosis Date    HLD (hyperlipidemia)     HTN (hypertension)     Memory loss     Osteoporosis      Past Surgical History:   Procedure Laterality Date    hemorroidectomy      HYSTERECTOMY       Family History   Problem Relation Age of Onset    Anxiety disorder Father      Social History     Tobacco Use    Smoking status: Every Day     Packs/day: 0.50     Years: 60.00     Pack years: 30.00     Types: Cigarettes    Smokeless tobacco: Never   Substance Use Topics    Alcohol use:  Not Currently    Drug use: Never     Review of Systems   Constitutional:  Positive for activity change and appetite change.        Generalized weakness    Gastrointestinal:  Positive for abdominal pain.   Genitourinary:  Positive for difficulty urinating and dysuria.        Urinary incontinence      Physical Exam     Initial Vitals [05/18/23 1312]   BP Pulse Resp Temp SpO2   (!) 106/55 90 18 97.2 °F (36.2 °C) 95 %      MAP       --         Physical Exam    Nursing note and vitals reviewed.  Constitutional: No distress.   Patient without lethargy   HENT:   Head: Normocephalic and atraumatic.   Slightly dry mucous membranes    Eyes: EOM are normal.   Neck: Trachea normal. Neck supple.   Normal range of motion.  Cardiovascular:  Normal rate and regular rhythm.           No murmur heard.  Pulmonary/Chest: Breath sounds normal. No respiratory distress.   Abdominal: Abdomen is soft. Bowel sounds are normal. She exhibits no distension. There is abdominal tenderness.   Mild tenderness to palpation  There is no rebound and no guarding.   Musculoskeletal:         General: Normal range of motion.      Cervical back: Normal range of motion and neck supple.      Lumbar back: Normal range of motion.     Neurological: She is alert. She has normal strength.   Alert and oriented during exam, answering questions approprietly    Skin: Skin is warm and dry. No rash noted.   Psychiatric: She has a normal mood and affect.       ED Course   Procedures  Labs Reviewed   COMPREHENSIVE METABOLIC PANEL - Abnormal; Notable for the following components:       Result Value    Creatinine 1.07 (*)     Calcium Level Total 10.5 (*)     Globulin 3.7 (*)     Albumin/Globulin Ratio 1.0 (*)     All other components within normal limits   CBC WITH DIFFERENTIAL - Abnormal; Notable for the following components:    WBC 15.81 (*)     RBC 4.12 (*)     MCV 96.6 (*)     MCH 32.3 (*)     MPV 12.9 (*)     Neut # 11.25 (*)     Mono # 1.70 (*)     IG# 0.16 (*)      All other components within normal limits   URINALYSIS, REFLEX TO URINE CULTURE - Normal   LACTIC ACID, PLASMA - Normal   URINALYSIS, MICROSCOPIC - Normal   BLOOD CULTURE OLG   BLOOD CULTURE OLG   CBC W/ AUTO DIFFERENTIAL    Narrative:     The following orders were created for panel order CBC auto differential.  Procedure                               Abnormality         Status                     ---------                               -----------         ------                     CBC with Differential[[id number]]        Abnormal            Final result                 Please view results for these tests on the individual orders.          Imaging Results              CT Abdomen Pelvis  Without Contrast (Final result)  Result time 05/18/23 18:22:25      Final result by Dyana Jacobs MD (05/18/23 18:22:25)                   Impression:      Small amount inflammatory change involving the urinary bladder with a bubble of air within it appears cystitis should be excluded.    Infrarenal abdominal aortic aneurysm    Right lower lobe atelectasis versus developing infiltrate with a small right-sided pleural effusion      Electronically signed by: Dyana Jacobs  Date:    05/18/2023  Time:    18:22               Narrative:    EXAMINATION:  CT ABDOMEN PELVIS WITHOUT CONTRAST    CLINICAL HISTORY:  LLQ abdominal pain;    TECHNIQUE:  Low dose axial images, sagittal and coronal reformations were obtained from the lung bases to the pubic symphysis.  No contrast was administered.  Automatic exposure control is utilized to reduce patient radiation exposure.    COMPARISON:  11/13/2022    FINDINGS:  There is right lower lobe atelectasis versus developing infiltrate.  There is a small right-sided pleural effusion..    The liver appears normal.  No obvious liver mass or lesion is seen.    Gallbladder appears normal.  No gallstones are seen.    The pancreas appears normal.  No inflammatory changes are seen in the pancreatic  region.    The spleen appears normal and adrenal glands appear normal.  No adrenal nodule is seen.    No nephrolithiasis is seen.  No hydronephrosis is seen.  No hydroureter is seen.  No ureteral stone is seen.    There is a infrarenal abdominal aortic aneurysm seen that measures 3.9 cm.    No colitis is seen.  There are multiple diverticuli in sigmoid colon but no diverticulitis is seen.  No appendicitis is seen.    No free air seen.  No free fluid is seen.    There is some bladder diverticuli seen on the right and left side inferiorly.  Some inflammatory changes are seen along the dome of the bladder possibly representing cystitis.  Correlation with urinalysis is recommended.  There is also a small bubble of air in the dome of the urinary bladder..    Bones show no acute abnormality.                                       CT Head Without Contrast (Final result)  Result time 05/18/23 18:18:04      Final result by Rosmery Cardozo MD (05/18/23 18:18:04)                   Impression:      No appreciable acute intracranial abnormality.    Moderate cerebral atrophy with periventricular and subcortical white matter changes most compatible with chronic small vessel ischemic disease.      Electronically signed by: Rosmery Cardozo  Date:    05/18/2023  Time:    18:18               Narrative:    EXAMINATION:  CT HEAD WITHOUT CONTRAST    CLINICAL HISTORY:  Mental status change, unknown cause;    TECHNIQUE:  Low dose axial CT images obtained throughout the head without intravenous contrast.  Axial, sagittal and coronal reconstructions were performed and interpreted.    DLP: 1061 mGycm    All CT scans at this location are performed using dose optimization techniques as appropriate to a performed exam including the following automated exposure control, adjustment of the mA and/or kV according to patient size and/or use of iterative reconstruction technique    COMPARISON:  CT head 05/12/2023    FINDINGS:  BRAIN: Gray white  differentiation is maintained. Periventricular and subcortical white matter changes most compatible with chronic small vessel ischemic disease.  Unchanged hypodensity at the right internal capsule.  Moderate cerebral atrophy.  No hemorrhage. No edema. No mass effect or midline shift.  The posterior fossa and midline structures are unremarkable.    VENTRICLES: Normal in size and configuration.  Choroid plexus calcifications.    EXTRA-AXIAL: Ex vacuo expansion of the extra-axial spaces.  No appreciable extra-axial hemorrhage.    BONES: Calvarium is intact.    SINUSES AND MASTOIDS: Visualized paranasal sinuses and mastoid air cells are clear.                                       X-Ray Chest AP Portable (Final result)  Result time 05/18/23 16:25:45      Final result by Dyana Jacobs MD (05/18/23 16:25:45)                   Impression:      Interval development some right lower lobe atelectasis and small right-sided pleural effusion.  Follow-up is recommended to exclude developing infiltrate      Electronically signed by: Dyana Jacobs  Date:    05/18/2023  Time:    16:25               Narrative:    EXAMINATION:  XR CHEST AP PORTABLE    CLINICAL HISTORY:  Altered mental status;    TECHNIQUE:  Single frontal view of the chest was performed.    COMPARISON:  05/12/2023    FINDINGS:  There is some atelectasis in the lung bases.  There has been interval development of small right-sided pleural effusion.  The heart is slightly enlarged in size.  Aorta is ectatic.  Bones and joints show no acute abnormality.                                       Medications   azithromycin 500 mg in dextrose 5 % 250 mL IVPB (ready to mix system) (0 mg Intravenous Stopped 5/18/23 2202)   cefTRIAXone (ROCEPHIN) 1 g in dextrose 5 % in water (D5W) 5 % 50 mL IVPB (MB+) (has no administration in time range)   dextrose 5 % and 0.45 % NaCl infusion ( Intravenous New Bag 5/18/23 2102)   aspirin EC tablet 81 mg (81 mg Oral Given 5/19/23  0907)   pravastatin tablet 40 mg (40 mg Oral Given 5/19/23 0905)   memantine tablet 10 mg (10 mg Oral Given 5/19/23 0905)   lisinopriL tablet 20 mg (20 mg Oral Given 5/19/23 0905)   sodium chloride 0.9% flush 10 mL (has no administration in time range)   melatonin tablet 6 mg (has no administration in time range)   ondansetron injection 4 mg (has no administration in time range)   polyethylene glycol packet 17 g (has no administration in time range)   senna-docusate 8.6-50 mg per tablet 2 tablet (has no administration in time range)   acetaminophen tablet 650 mg (has no administration in time range)   aluminum-magnesium hydroxide-simethicone 200-200-20 mg/5 mL suspension 30 mL (has no administration in time range)   acetaminophen tablet 1,000 mg (has no administration in time range)   enoxaparin injection 40 mg (has no administration in time range)   benzonatate capsule 200 mg (has no administration in time range)   dextromethorphan-guaiFENesin  mg/5 ml liquid 5 mL (has no administration in time range)   QUEtiapine tablet 50 mg (has no administration in time range)   cefTRIAXone (ROCEPHIN) 1 g in dextrose 5 % in water (D5W) 5 % 50 mL IVPB (MB+) (0 g Intravenous Stopped 5/18/23 1520)   sodium chloride 0.9% bolus 500 mL 500 mL (0 mLs Intravenous Stopped 5/18/23 1621)   haloperidol lactate injection 5 mg (5 mg Intramuscular Given 5/18/23 2102)     And   diphenhydrAMINE injection 25 mg (25 mg Intravenous Given 5/18/23 2102)     Medical Decision Making:   Initial Assessment:   As per HPI  Differential Diagnosis:   Altered mental status, dehydration, UTI, pneumonia, electrolyte abnormality  Clinical Tests:   Lab Tests: Ordered and Reviewed       <> Summary of Lab: All labs are stable, white blood cell count is elevated at 15,000  Radiological Study: Ordered and Reviewed  ED Management:  Patient remains in no distress.  Patient was given Rocephin 1 g IV piggyback while in the ER.  CT shows probable right lower lobe  infiltrate.  Will admit for observation.         Scribe Attestation:   Scribe #1: I performed the above scribed service and the documentation accurately describes the services I performed. I attest to the accuracy of the note.    Attending Attestation:           Physician Attestation for Scribe:  Physician Attestation Statement for Scribe #1: I, Yasmani Lewis MD, reviewed documentation, as scribed by Trudi Nixon in my presence, and it is both accurate and complete.           ED Course as of 05/19/23 1511   Thu May 18, 2023   1837 Paged Hospitalist  [DP]   1837 Call and Consult Hospitalist  [DP]   1844 Jayla SPEARS, OK to admit [KG]   1844 She remains in no apparent distress.  Daughter states she is still confused.  Patient answers questions appropriately for the most part.  She is in no apparent distress. [KG]      ED Course User Index  [DP] Tracey Nixon  [KG] Yasmani Lewis MD                 Clinical Impression:   Final diagnoses:  [R41.82] Altered mental status, unspecified altered mental status type (Primary)  [J18.9] Pneumonia of right lower lobe due to infectious organism  [J18.9] CAP (community acquired pneumonia)        ED Disposition Condition    Observation Stable                Yasmani Lewis MD  05/19/23 1511

## 2023-05-18 NOTE — Clinical Note
Diagnosis: Altered mental status, unspecified altered mental status type [[id number]]   Future Attending Provider: ZACH CAR [[id number]]   Admitting Provider:: ZACH CAR [[id number]]   Special Needs:: Fall Risk [15]

## 2023-05-19 LAB
ALBUMIN SERPL-MCNC: 3.4 G/DL (ref 3.4–4.8)
ALBUMIN/GLOB SERPL: 0.9 RATIO (ref 1.1–2)
ALP SERPL-CCNC: 115 UNIT/L (ref 40–150)
ALT SERPL-CCNC: 12 UNIT/L (ref 0–55)
AST SERPL-CCNC: 24 UNIT/L (ref 5–34)
B PERT.PT PRMT NPH QL NAA+NON-PROBE: NOT DETECTED
BILIRUBIN DIRECT+TOT PNL SERPL-MCNC: 0.7 MG/DL
BUN SERPL-MCNC: 10.1 MG/DL (ref 9.8–20.1)
C PNEUM DNA NPH QL NAA+NON-PROBE: NOT DETECTED
CALCIUM SERPL-MCNC: 11 MG/DL (ref 8.4–10.2)
CHLORIDE SERPL-SCNC: 105 MMOL/L (ref 98–107)
CO2 SERPL-SCNC: 22 MMOL/L (ref 23–31)
CREAT SERPL-MCNC: 0.84 MG/DL (ref 0.55–1.02)
ERYTHROCYTE [DISTWIDTH] IN BLOOD BY AUTOMATED COUNT: 13.5 % (ref 11.5–17)
GFR SERPLBLD CREATININE-BSD FMLA CKD-EPI: >60 MLS/MIN/1.73/M2
GLOBULIN SER-MCNC: 3.9 GM/DL (ref 2.4–3.5)
GLUCOSE SERPL-MCNC: 112 MG/DL (ref 82–115)
HADV DNA NPH QL NAA+NON-PROBE: NOT DETECTED
HCOV 229E RNA NPH QL NAA+NON-PROBE: NOT DETECTED
HCOV HKU1 RNA NPH QL NAA+NON-PROBE: NOT DETECTED
HCOV NL63 RNA NPH QL NAA+NON-PROBE: NOT DETECTED
HCOV OC43 RNA NPH QL NAA+NON-PROBE: NOT DETECTED
HCT VFR BLD AUTO: 41.2 % (ref 37–47)
HGB BLD-MCNC: 13.6 G/DL (ref 12–16)
HMPV RNA NPH QL NAA+NON-PROBE: NOT DETECTED
HPIV1 RNA NPH QL NAA+NON-PROBE: NOT DETECTED
HPIV2 RNA NPH QL NAA+NON-PROBE: NOT DETECTED
HPIV3 RNA NPH QL NAA+NON-PROBE: NOT DETECTED
HPIV4 RNA NPH QL NAA+NON-PROBE: NOT DETECTED
M PNEUMO DNA NPH QL NAA+NON-PROBE: NOT DETECTED
MAGNESIUM SERPL-MCNC: 1.9 MG/DL (ref 1.6–2.6)
MCH RBC QN AUTO: 31.2 PG (ref 27–31)
MCHC RBC AUTO-ENTMCNC: 33 G/DL (ref 33–36)
MCV RBC AUTO: 94.5 FL (ref 80–94)
NRBC BLD AUTO-RTO: 0 %
PHOSPHATE SERPL-MCNC: 3.3 MG/DL (ref 2.3–4.7)
PLATELET # BLD AUTO: 185 X10(3)/MCL (ref 130–400)
PMV BLD AUTO: 12.8 FL (ref 7.4–10.4)
POTASSIUM SERPL-SCNC: 4.4 MMOL/L (ref 3.5–5.1)
PROT SERPL-MCNC: 7.3 GM/DL (ref 5.8–7.6)
RBC # BLD AUTO: 4.36 X10(6)/MCL (ref 4.2–5.4)
RSV RNA NPH QL NAA+NON-PROBE: NOT DETECTED
RV+EV RNA NPH QL NAA+NON-PROBE: NOT DETECTED
SODIUM SERPL-SCNC: 140 MMOL/L (ref 136–145)
WBC # SPEC AUTO: 12.56 X10(3)/MCL (ref 4.5–11.5)

## 2023-05-19 PROCEDURE — 80053 COMPREHEN METABOLIC PANEL: CPT | Performed by: INTERNAL MEDICINE

## 2023-05-19 PROCEDURE — 83735 ASSAY OF MAGNESIUM: CPT | Performed by: INTERNAL MEDICINE

## 2023-05-19 PROCEDURE — 25000003 PHARM REV CODE 250: Performed by: INTERNAL MEDICINE

## 2023-05-19 PROCEDURE — 84100 ASSAY OF PHOSPHORUS: CPT | Performed by: INTERNAL MEDICINE

## 2023-05-19 PROCEDURE — 63600175 PHARM REV CODE 636 W HCPCS: Performed by: INTERNAL MEDICINE

## 2023-05-19 PROCEDURE — 85027 COMPLETE CBC AUTOMATED: CPT | Performed by: INTERNAL MEDICINE

## 2023-05-19 PROCEDURE — 11000001 HC ACUTE MED/SURG PRIVATE ROOM

## 2023-05-19 RX ORDER — QUETIAPINE FUMARATE 25 MG/1
50 TABLET, FILM COATED ORAL NIGHTLY
Status: DISCONTINUED | OUTPATIENT
Start: 2023-05-19 | End: 2023-05-25 | Stop reason: HOSPADM

## 2023-05-19 RX ADMIN — ASPIRIN 81 MG: 81 TABLET, COATED ORAL at 09:05

## 2023-05-19 RX ADMIN — LISINOPRIL 20 MG: 20 TABLET ORAL at 09:05

## 2023-05-19 RX ADMIN — CEFTRIAXONE SODIUM 1 G: 1 INJECTION, POWDER, FOR SOLUTION INTRAMUSCULAR; INTRAVENOUS at 03:05

## 2023-05-19 RX ADMIN — PRAVASTATIN SODIUM 40 MG: 40 TABLET ORAL at 09:05

## 2023-05-19 RX ADMIN — MEMANTINE 10 MG: 5 TABLET ORAL at 09:05

## 2023-05-19 RX ADMIN — MEMANTINE 10 MG: 5 TABLET ORAL at 08:05

## 2023-05-19 RX ADMIN — AZITHROMYCIN MONOHYDRATE 500 MG: 500 INJECTION, POWDER, LYOPHILIZED, FOR SOLUTION INTRAVENOUS at 09:05

## 2023-05-19 RX ADMIN — QUETIAPINE FUMARATE 50 MG: 25 TABLET ORAL at 08:05

## 2023-05-19 RX ADMIN — ENOXAPARIN SODIUM 40 MG: 40 INJECTION SUBCUTANEOUS at 05:05

## 2023-05-19 NOTE — PROGRESS NOTES
[facility]  Hospital Medicine Progress Note        Chief Complaint: Inpatient Follow-up for     HPI: 82-year-old female medical history of Alzheimer dementia, hypertension hyperlipidemia presented to the ED with family reporting worsening confusion over the past 2 weeks.  History obtained from patient and her daughter at bedside.  Report her symptoms started with difficulty sleeping, increasing disorientation and confusion and lethargy at times, she was diagnosed with UTI on 5/12 and prescribed Macrobid, her symptom did not improve and today was prescribed Bactrim.  Daughter also reports she has worsening cough and complaining of pain to her right side of abdomen/chest intermittently over the past 2 weeks.     On arrival to ED she was afebrile and hemodynamically stable saturating 95% on room air.  CT head showed no acute intracranial findings.  Chest x-ray show right lower lobe atelectasis/pleural effusion.  CT abdomen and pelvis without contrast notable for right lower lobe atelectasis versus consolidation with small right-sided pleural effusion.  Labs notable for WBC 15.8, normal lactic acid, urinalysis unremarkable.     She was given 500 mL normal saline and 1 g ceftriaxone and referred to hospital medicine service for further evaluation and management.       Interval Hx:     Patient seen and examined this morning with patient's daughters bedside who reported that patient was having some suprapubic pain otherwise afebrile  Objective/physical exam:  General: In no acute distress, afebrile  Chest: Clear to auscultation bilaterally  Heart: RRR, +S1, S2, no appreciable murmur  Abdomen: Soft,  mild tenderness around the suprapubic region  MSK: Warm, no lower extremity edema, no clubbing or cyanosis  Neurologic: Alert and oriented x4,   VITAL SIGNS: 24 HRS MIN & MAX LAST   Temp  Min: 97.2 °F (36.2 °C)  Max: 99.1 °F (37.3 °C) 99 °F (37.2 °C)   BP  Min: 106/55  Max: 167/90 126/69   Pulse   Min: 80  Max: 92  85   Resp  Min: 16  Max: 22 19   SpO2  Min: 90 %  Max: 98 % (!) 92 %       Recent Labs   Lab 05/12/23  1408 05/18/23  1336 05/19/23  0544   WBC 9.89 15.81* 12.56*   RBC 4.27 4.12* 4.36   HGB 13.5 13.3 13.6   HCT 40.4 39.8 41.2   MCV 94.6* 96.6* 94.5*   MCH 31.6* 32.3* 31.2*   MCHC 33.4 33.4 33.0   RDW 13.3 13.8 13.5    216 185   MPV 13.0* 12.9* 12.8*       Recent Labs   Lab 05/12/23  1443 05/18/23  1336 05/19/23  0544    137 140   K 3.9 4.8 4.4   CO2 25 23 22*   BUN 13.4 10.9 10.1   CREATININE 0.73 1.07* 0.84   CALCIUM 10.3* 10.5* 11.0*   MG  --   --  1.90   ALBUMIN 3.6 3.7 3.4   ALKPHOS 102 118 115   ALT 12 11 12   AST 13 22 24   BILITOT 0.7 0.6 0.7          Microbiology Results (last 7 days)       Procedure Component Value Units Date/Time    Blood Culture [[id number]] Collected: 05/18/23 2119    Order Status: Resulted Specimen: Blood from Antecubital, Right Updated: 05/18/23 2119    Blood Culture [[id number]] Collected: 05/18/23 2119    Order Status: Resulted Specimen: Blood from Arm, Right Updated: 05/18/23 2119             See below for Radiology    Scheduled Med:   aspirin  81 mg Oral Daily    azithromycin  500 mg Intravenous Q24H    cefTRIAXone (ROCEPHIN) IVPB  1 g Intravenous Q24H    enoxparin  40 mg Subcutaneous Daily    lisinopriL  20 mg Oral Daily    memantine  10 mg Oral BID    pravastatin  40 mg Oral Daily    QUEtiapine  50 mg Oral QHS        Continuous Infusions:   dextrose 5 % and 0.45 % NaCl 75 mL/hr at 05/18/23 2102        PRN Meds:  acetaminophen, acetaminophen, aluminum-magnesium hydroxide-simethicone, benzonatate, dextromethorphan-guaiFENesin  mg/5 ml, melatonin, ondansetron, polyethylene glycol, senna-docusate 8.6-50 mg, sodium chloride 0.9%       Assessment/Plan:   Right lower lobe community-acquired pneumonia   Acute delirium superimposed on dementia   Possible UTI?    History of dementia   essential hypertension   Hyperlipidemia    COVID influenza RSV all  negative, get MRSA and respiratory PCR, UA was negative on admission  CT of the abdomen had shown concern about possible cystitis even though UA has been negative but patient is already on Rocephin so that should take care of it  Will also start on Colace and MiraLax for constipation   Home medications have been resumed that includes lisinopril, aspirin, memantine, pravastatin and Seroquel  VTE prophylaxis:  Lovenox    Patient condition:  Stable/Fair/Guarded/ Serious/ Critical    Anticipated discharge and Disposition:         All diagnosis and differential diagnosis have been reviewed; assessment and plan has been documented; I have personally reviewed the labs and test results that are presently available; I have reviewed the patients medication list; I have reviewed the consulting providers response and recommendations. I have reviewed or attempted to review medical records based upon their availability    All of the patient's questions have been  addressed and answered. Patient's is agreeable to the above stated plan. I will continue to monitor closely and make adjustments to medical management as needed.  _____________________________________________________________________    Nutrition Status:    Radiology:  CT Abdomen Pelvis  Without Contrast  Narrative: EXAMINATION:  CT ABDOMEN PELVIS WITHOUT CONTRAST    CLINICAL HISTORY:  LLQ abdominal pain;    TECHNIQUE:  Low dose axial images, sagittal and coronal reformations were obtained from the lung bases to the pubic symphysis.  No contrast was administered.  Automatic exposure control is utilized to reduce patient radiation exposure.    COMPARISON:  11/13/2022    FINDINGS:  There is right lower lobe atelectasis versus developing infiltrate.  There is a small right-sided pleural effusion..    The liver appears normal.  No obvious liver mass or lesion is seen.    Gallbladder appears normal.  No gallstones are seen.    The pancreas appears normal.  No inflammatory  changes are seen in the pancreatic region.    The spleen appears normal and adrenal glands appear normal.  No adrenal nodule is seen.    No nephrolithiasis is seen.  No hydronephrosis is seen.  No hydroureter is seen.  No ureteral stone is seen.    There is a infrarenal abdominal aortic aneurysm seen that measures 3.9 cm.    No colitis is seen.  There are multiple diverticuli in sigmoid colon but no diverticulitis is seen.  No appendicitis is seen.    No free air seen.  No free fluid is seen.    There is some bladder diverticuli seen on the right and left side inferiorly.  Some inflammatory changes are seen along the dome of the bladder possibly representing cystitis.  Correlation with urinalysis is recommended.  There is also a small bubble of air in the dome of the urinary bladder..    Bones show no acute abnormality.  Impression: Small amount inflammatory change involving the urinary bladder with a bubble of air within it appears cystitis should be excluded.    Infrarenal abdominal aortic aneurysm    Right lower lobe atelectasis versus developing infiltrate with a small right-sided pleural effusion    Electronically signed by: Dyana Jacobs  Date:    05/18/2023  Time:    18:22  CT Head Without Contrast  Narrative: EXAMINATION:  CT HEAD WITHOUT CONTRAST    CLINICAL HISTORY:  Mental status change, unknown cause;    TECHNIQUE:  Low dose axial CT images obtained throughout the head without intravenous contrast.  Axial, sagittal and coronal reconstructions were performed and interpreted.    DLP: 1061 mGycm    All CT scans at this location are performed using dose optimization techniques as appropriate to a performed exam including the following automated exposure control, adjustment of the mA and/or kV according to patient size and/or use of iterative reconstruction technique    COMPARISON:  CT head 05/12/2023    FINDINGS:  BRAIN: Gray white differentiation is maintained. Periventricular and subcortical white  matter changes most compatible with chronic small vessel ischemic disease.  Unchanged hypodensity at the right internal capsule.  Moderate cerebral atrophy.  No hemorrhage. No edema. No mass effect or midline shift.  The posterior fossa and midline structures are unremarkable.    VENTRICLES: Normal in size and configuration.  Choroid plexus calcifications.    EXTRA-AXIAL: Ex vacuo expansion of the extra-axial spaces.  No appreciable extra-axial hemorrhage.    BONES: Calvarium is intact.    SINUSES AND MASTOIDS: Visualized paranasal sinuses and mastoid air cells are clear.  Impression: No appreciable acute intracranial abnormality.    Moderate cerebral atrophy with periventricular and subcortical white matter changes most compatible with chronic small vessel ischemic disease.    Electronically signed by: Rosmery Cardozo  Date:    05/18/2023  Time:    18:18  X-Ray Chest AP Portable  Narrative: EXAMINATION:  XR CHEST AP PORTABLE    CLINICAL HISTORY:  Altered mental status;    TECHNIQUE:  Single frontal view of the chest was performed.    COMPARISON:  05/12/2023    FINDINGS:  There is some atelectasis in the lung bases.  There has been interval development of small right-sided pleural effusion.  The heart is slightly enlarged in size.  Aorta is ectatic.  Bones and joints show no acute abnormality.  Impression: Interval development some right lower lobe atelectasis and small right-sided pleural effusion.  Follow-up is recommended to exclude developing infiltrate    Electronically signed by: Dyana Jacobs  Date:    05/18/2023  Time:    16:25      Gwendolyn Davis MD   05/19/2023

## 2023-05-19 NOTE — PROGRESS NOTES
05/19/23 1051   Discharge Assessment   Assessment Type Discharge Planning Assessment   Confirmed/corrected address, phone number and insurance Yes   Confirmed Demographics Correct on Facesheet   Source of Information patient;family   When was your last doctors appointment?   (PCP: Cruz Almendarez MD at [facility])   Communicated HENRIK with patient/caregiver Date not available/Unable to determine   Reason For Admission Altered Mental Status, unspecified,RLL Pneumonia, UTI   People in Home alone   Do you expect to return to your current living situation? Yes   Do you have help at home or someone to help you manage your care at home? Yes   Who are your caregiver(s) and their phone number(s)? Ana Hemphill [phone]   Prior to hospitilization cognitive status:   (altered Mental Status.)   Current cognitive status:   (Altered Mental Status)   Do you have any problems with:   (Ana Hemphill does grocery shopping)   Home Accessibility   (Patient able to ambulate)   Home Layout Able to live on 1st floor   Equipment Currently Used at Home none   Readmission within 30 days? No   Patient currently being followed by outpatient case management? No   Do you take prescription medications? Yes   Do you have prescription coverage? Yes   Do you have any problems affording any of your prescribed medications? No   Is the patient taking medications as prescribed? yes   Who is going to help you get home at discharge? Ana Hemphill   How do you get to doctors appointments? car, drives self   Are you on dialysis? No   Do you take coumadin? No   Discharge Plan A Home   Discharge Plan B Home   DME Needed Upon Discharge  none   Discharge Plan discussed with: Patient   Transition of Care Barriers None   Physical Activity   On average, how many days per week do you engage in moderate to strenuous exercise (like a brisk walk)? 0 days   On average, how many minutes do you engage in exercise at this level? 0 min    Financial Resource Strain   How hard is it for you to pay for the very basics like food, housing, medical care, and heating? Not hard   Housing Stability   In the last 12 months, was there a time when you were not able to pay the mortgage or rent on time? N   In the last 12 months, how many places have you lived? 1   In the last 12 months, was there a time when you did not have a steady place to sleep or slept in a shelter (including now)? N   Food Insecurity   Within the past 12 months, you worried that your food would run out before you got the money to buy more. Never true   Within the past 12 months, the food you bought just didn't last and you didn't have money to get more. Never true   Stress   Do you feel stress - tense, restless, nervous, or anxious, or unable to sleep at night because your mind is troubled all the time - these days? Not at all   Social Connections   In a typical week, how many times do you talk on the phone with family, friends, or neighbors? More than 3   How often do you get together with friends or relatives? More than 3   How often do you attend Mormon or Methodist services? Never   Do you belong to any clubs or organizations such as Mormon groups, unions, fraternal or athletic groups, or school groups? No   How often do you attend meetings of the clubs or organizations you belong to? Never   Are you , , , , never , or living with a partner?    Alcohol Use   Q1: How often do you have a drink containing alcohol? Never   Q2: How many drinks containing alcohol do you have on a typical day when you are drinking? None   Q3: How often do you have six or more drinks on one occasion? Never

## 2023-05-19 NOTE — H&P
[facility] Hospital Medicine - H&P Note    Patient Name: Guillermina Stewart  : 1940  MRN: [id number]  PCP: Cruz Almendarez MD  Admitting Physician: Romain Patricia MD  Admission Class: OP- Observation   Length of Stay: 0  Face-to-Face encounter date: 2023  Code status: Full    Chief Complaint   Worsening confusion    History of Present Illness   This is an 82-year-old female medical history of Alzheimer dementia, hypertension hyperlipidemia presented to the ED with family reporting worsening confusion over the past 2 weeks.  History obtained from patient and her daughter at bedside.  Report her symptoms started with difficulty sleeping, increasing disorientation and confusion and lethargy at times, she was diagnosed with UTI on  and prescribed Macrobid, her symptom did not improve and today was prescribed Bactrim.  Daughter also reports she has worsening cough and complaining of pain to her right side of abdomen/chest intermittently over the past 2 weeks.    On arrival to ED she was afebrile and hemodynamically stable saturating 95% on room air.  CT head showed no acute intracranial findings.  Chest x-ray show right lower lobe atelectasis/pleural effusion.  CT abdomen and pelvis without contrast notable for right lower lobe atelectasis versus consolidation with small right-sided pleural effusion.  Labs notable for WBC 15.8, normal lactic acid, urinalysis unremarkable.    She was given 500 mL normal saline and 1 g ceftriaxone and referred to hospital medicine service for further evaluation and management.    ROS   Except as documented, all other systems reviewed and negative     Past Medical History   Alzheimer dementia  Hypertension  Hyperlipidemia  Osteoporosis    Past Surgical History     Past Surgical History:   Procedure Laterality Date    hemorroidectomy      HYSTERECTOMY         Social History     Social History     Tobacco Use    Smoking status: Every Day      Packs/day: 0.50     Years: 60.00     Pack years: 30.00     Types: Cigarettes    Smokeless tobacco: Never   Substance Use Topics    Alcohol use: Not Currently        Family History   Reviewed and negative    Allergies   Propoxyphene and Tramadol    Home Medications     Prior to Admission medications    Medication Sig Start Date End Date Taking? Authorizing Provider   aspirin (ECOTRIN) 81 MG EC tablet Take 1 tablet (81 mg total) by mouth once daily. 23 Yes Cruz Almendarez MD   lisinopriL (PRINIVIL,ZESTRIL) 20 MG tablet Take 1 tablet (20 mg total) by mouth once daily. 22 Yes Cony Marinelli MD   sulfamethoxazole-trimethoprim 800-160mg (BACTRIM DS) 800-160 mg Tab Take 1 tablet by mouth 2 (two) times daily. for 7 days 23 Yes Stephen Tobias MD   memantine (NAMENDA XR) 21 mg CSpX Take 21 mg by mouth once daily. 22   Cony Marinelli MD   pravastatin (PRAVACHOL) 40 MG tablet Take 1 tablet (40 mg total) by mouth once daily. 23   Cruz Almendarez MD   PROLIA 60 mg/mL Syrg SMARTSI Milliliter(s) SUB-Q Twice a Year 2/3/22   Historical Provider        Physical Exam   Vital Signs  Temp:  [98 °F (36.7 °C)-98.2 °F (36.8 °C)]   Pulse:  [80-92]   Resp:  [16-22]   BP: (109-167)/(36-90)   SpO2:  [92 %-98 %]    General: Appears comfortable  HEENT: NC/AT  Neck:  No JVD  Chest:  Clear to auscultation anteriorly  CVS: Regular rhythm. Normal S1/S2.  No pedal edema  Abdomen: nondistended, normoactive BS, soft and non-tender.  MSK: No obvious deformity or joint swelling  Skin: Warm and dry  Neuro:  Alert, oriented to self, disoriented to place and time, following commands, no focal neurologic deficits.  Psych: Cooperative    Labs     Recent Labs     23  1336   WBC 15.81*   RBC 4.12*   HGB 13.3   HCT 39.8   MCV 96.6*   MCH 32.3*   MCHC 33.4   RDW 13.8           Recent Labs     23  1336      K 4.8   CHLORIDE 102   CO2 23   BUN 10.9   CREATININE 1.07*    EGFRNORACEVR 52   GLUCOSE 111   CALCIUM 10.5*   ALBUMIN 3.7   GLOBULIN 3.7*   ALKPHOS 118   ALT 11   AST 22   BILITOT 0.6     Recent Labs     05/18/23  1607   LACTIC 1.7        Microbiology Results (last 7 days)       Procedure Component Value Units Date/Time    Blood Culture [[id number]] Collected: 05/18/23 2119    Order Status: Resulted Specimen: Blood from Antecubital, Right Updated: 05/18/23 2119    Blood Culture [[id number]] Collected: 05/18/23 2119    Order Status: Resulted Specimen: Blood from Arm, Right Updated: 05/18/23 2119           Imaging     CT Abdomen Pelvis  Without Contrast   Final Result      Small amount inflammatory change involving the urinary bladder with a bubble of air within it appears cystitis should be excluded.      Infrarenal abdominal aortic aneurysm      Right lower lobe atelectasis versus developing infiltrate with a small right-sided pleural effusion         Electronically signed by: Dyana Jacobs   Date:    05/18/2023   Time:    18:22      CT Head Without Contrast   Final Result      No appreciable acute intracranial abnormality.      Moderate cerebral atrophy with periventricular and subcortical white matter changes most compatible with chronic small vessel ischemic disease.         Electronically signed by: Rosmery Cardozo   Date:    05/18/2023   Time:    18:18      X-Ray Chest AP Portable   Final Result      Interval development some right lower lobe atelectasis and small right-sided pleural effusion.  Follow-up is recommended to exclude developing infiltrate         Electronically signed by: Dyana Jacobs   Date:    05/18/2023   Time:    16:25        Assessment & Plan   Community-acquired pneumonia- RLL  Suspect parapneumonic right pleural effusion  Acute delirium superimposed on dementia secondary to above    HX dementia, hypertension, hyperlipidemia    Plan:    Blood cultures x2, sputum culture, COVID-19/flu/RSV PCR, respiratory PCR panel  Continue IV ceftriaxone  and add azithromycin for 5-7 day therapy  D5 half-normal saline at 75 mL/hour for 1 day  Home medication reviewed and resumed  Delirium precautions  Start Seroquel 50 mg at bedtime  VTE Prophylaxis:  Enoxaparin 40 mg subQ daily      Romain Patricia MD  Internal Medicine

## 2023-05-19 NOTE — PLAN OF CARE
Problem: Adult Inpatient Plan of Care  Goal: Plan of Care Review  Outcome: Ongoing, Progressing  Flowsheets (Taken 5/18/2023 2302)  Plan of Care Reviewed With:   patient   daughter  Goal: Absence of Hospital-Acquired Illness or Injury  Outcome: Ongoing, Progressing  Intervention: Identify and Manage Fall Risk  Flowsheets (Taken 5/18/2023 2302)  Safety Promotion/Fall Prevention:   assistive device/personal item within reach   nonskid shoes/socks when out of bed   instructed to call staff for mobility   bed alarm set   Fall Risk reviewed with patient/family

## 2023-05-19 NOTE — NURSING
Nurses Note -- 4 Eyes      5/19/2023   5:46 AM      Skin assessed during: Admit      [] No Altered Skin Integrity Present    []Prevention Measures Documented      [] Yes- Altered Skin Integrity Present or Discovered   [] LDA Added if Not in Epic (Describe Wound)   [] New Altered Skin Integrity was Present on Admit and Documented in LDA   [] Wound Image Taken    Wound Care Consulted? No    Attending Nurse:  Alvina Page RN     Second RN/Staff Member:  Fouzia Kidd RN

## 2023-05-20 LAB
ALLENS TEST BLOOD GAS (OHS): YES
ANION GAP SERPL CALC-SCNC: 9 MEQ/L
BASE EXCESS BLD CALC-SCNC: 1.2 MMOL/L (ref -2–2)
BASOPHILS # BLD AUTO: 0.04 X10(3)/MCL
BASOPHILS NFR BLD AUTO: 0.4 %
BLOOD GAS SAMPLE TYPE (OHS): ABNORMAL
BUN SERPL-MCNC: 17.2 MG/DL (ref 9.8–20.1)
CA-I BLD-SCNC: 1.39 MMOL/L (ref 1.12–1.23)
CALCIUM SERPL-MCNC: 10.1 MG/DL (ref 8.4–10.2)
CHLORIDE SERPL-SCNC: 104 MMOL/L (ref 98–107)
CO2 BLDA-SCNC: 26.1 MMOL/L
CO2 SERPL-SCNC: 19 MMOL/L (ref 23–31)
COHGB MFR BLDA: 1.9 % (ref 0.5–1.5)
CREAT SERPL-MCNC: 0.87 MG/DL (ref 0.55–1.02)
CREAT/UREA NIT SERPL: 20
DRAWN BY BLOOD GAS (OHS): ABNORMAL
EOSINOPHIL # BLD AUTO: 0.33 X10(3)/MCL (ref 0–0.9)
EOSINOPHIL NFR BLD AUTO: 3 %
ERYTHROCYTE [DISTWIDTH] IN BLOOD BY AUTOMATED COUNT: 13.3 % (ref 11.5–17)
GFR SERPLBLD CREATININE-BSD FMLA CKD-EPI: >60 MLS/MIN/1.73/M2
GLUCOSE SERPL-MCNC: 129 MG/DL (ref 82–115)
HCO3 BLDA-SCNC: 25 MMOL/L (ref 22–26)
HCT VFR BLD AUTO: 36 % (ref 37–47)
HGB BLD-MCNC: 12 G/DL (ref 12–16)
IMM GRANULOCYTES # BLD AUTO: 0.09 X10(3)/MCL (ref 0–0.04)
IMM GRANULOCYTES NFR BLD AUTO: 0.8 %
LACTATE SERPL-SCNC: 1.2 MMOL/L (ref 0.5–2.2)
LYMPHOCYTES # BLD AUTO: 1.68 X10(3)/MCL (ref 0.6–4.6)
LYMPHOCYTES NFR BLD AUTO: 15.2 %
MCH RBC QN AUTO: 32.1 PG (ref 27–31)
MCHC RBC AUTO-ENTMCNC: 33.3 G/DL (ref 33–36)
MCV RBC AUTO: 96.3 FL (ref 80–94)
METHGB MFR BLDA: 1.3 % (ref 0.4–1.5)
MONOCYTES # BLD AUTO: 0.92 X10(3)/MCL (ref 0.1–1.3)
MONOCYTES NFR BLD AUTO: 8.3 %
NEUTROPHILS # BLD AUTO: 8 X10(3)/MCL (ref 2.1–9.2)
NEUTROPHILS NFR BLD AUTO: 72.3 %
NRBC BLD AUTO-RTO: 0 %
O2 HB BLOOD GAS (OHS): 92.5 % (ref 94–97)
OXYHGB MFR BLDA: 12.3 G/DL (ref 12–16)
PCO2 BLDA: 36 MMHG (ref 35–45)
PH BLDA: 7.45 [PH] (ref 7.35–7.45)
PLATELET # BLD AUTO: 190 X10(3)/MCL (ref 130–400)
PMV BLD AUTO: 12.7 FL (ref 7.4–10.4)
PO2 BLDA: 67 MMHG (ref 80–100)
POTASSIUM BLOOD GAS (OHS): 4.3 MMOL/L (ref 3.5–5)
POTASSIUM SERPL-SCNC: 4.1 MMOL/L (ref 3.5–5.1)
RBC # BLD AUTO: 3.74 X10(6)/MCL (ref 4.2–5.4)
SAMPLE SITE BLOOD GAS (OHS): ABNORMAL
SAO2 % BLDA: 93.9 %
SODIUM BLOOD GAS (OHS): 133 MMOL/L (ref 137–145)
SODIUM SERPL-SCNC: 132 MMOL/L (ref 136–145)
WBC # SPEC AUTO: 11.06 X10(3)/MCL (ref 4.5–11.5)

## 2023-05-20 PROCEDURE — 11000001 HC ACUTE MED/SURG PRIVATE ROOM

## 2023-05-20 PROCEDURE — 99900035 HC TECH TIME PER 15 MIN (STAT)

## 2023-05-20 PROCEDURE — 25000003 PHARM REV CODE 250: Performed by: INTERNAL MEDICINE

## 2023-05-20 PROCEDURE — 83605 ASSAY OF LACTIC ACID: CPT | Performed by: INTERNAL MEDICINE

## 2023-05-20 PROCEDURE — 63600175 PHARM REV CODE 636 W HCPCS: Performed by: INTERNAL MEDICINE

## 2023-05-20 PROCEDURE — 80048 BASIC METABOLIC PNL TOTAL CA: CPT | Performed by: INTERNAL MEDICINE

## 2023-05-20 PROCEDURE — 36600 WITHDRAWAL OF ARTERIAL BLOOD: CPT

## 2023-05-20 PROCEDURE — 85025 COMPLETE CBC W/AUTO DIFF WBC: CPT | Performed by: INTERNAL MEDICINE

## 2023-05-20 PROCEDURE — 82803 BLOOD GASES ANY COMBINATION: CPT

## 2023-05-20 RX ORDER — AMOXICILLIN 250 MG
2 CAPSULE ORAL 2 TIMES DAILY
Status: DISCONTINUED | OUTPATIENT
Start: 2023-05-20 | End: 2023-05-25 | Stop reason: HOSPADM

## 2023-05-20 RX ORDER — POLYETHYLENE GLYCOL 3350 17 G/17G
17 POWDER, FOR SOLUTION ORAL 2 TIMES DAILY
Status: DISCONTINUED | OUTPATIENT
Start: 2023-05-20 | End: 2023-05-25 | Stop reason: HOSPADM

## 2023-05-20 RX ADMIN — MEMANTINE 10 MG: 5 TABLET ORAL at 08:05

## 2023-05-20 RX ADMIN — ENOXAPARIN SODIUM 40 MG: 40 INJECTION SUBCUTANEOUS at 04:05

## 2023-05-20 RX ADMIN — QUETIAPINE FUMARATE 50 MG: 25 TABLET ORAL at 09:05

## 2023-05-20 RX ADMIN — SENNOSIDES AND DOCUSATE SODIUM 2 TABLET: 50; 8.6 TABLET ORAL at 09:05

## 2023-05-20 RX ADMIN — PRAVASTATIN SODIUM 40 MG: 40 TABLET ORAL at 08:05

## 2023-05-20 RX ADMIN — SENNOSIDES AND DOCUSATE SODIUM 2 TABLET: 50; 8.6 TABLET ORAL at 10:05

## 2023-05-20 RX ADMIN — CEFTRIAXONE SODIUM 1 G: 1 INJECTION, POWDER, FOR SOLUTION INTRAMUSCULAR; INTRAVENOUS at 02:05

## 2023-05-20 RX ADMIN — AZITHROMYCIN MONOHYDRATE 500 MG: 500 INJECTION, POWDER, LYOPHILIZED, FOR SOLUTION INTRAVENOUS at 09:05

## 2023-05-20 RX ADMIN — LISINOPRIL 20 MG: 20 TABLET ORAL at 08:05

## 2023-05-20 RX ADMIN — POLYETHYLENE GLYCOL 3350 17 G: 17 POWDER, FOR SOLUTION ORAL at 10:05

## 2023-05-20 RX ADMIN — ASPIRIN 81 MG: 81 TABLET, COATED ORAL at 08:05

## 2023-05-20 RX ADMIN — MEMANTINE 10 MG: 5 TABLET ORAL at 09:05

## 2023-05-20 RX ADMIN — SODIUM CHLORIDE 500 ML: 9 INJECTION, SOLUTION INTRAVENOUS at 11:05

## 2023-05-20 NOTE — PROGRESS NOTES
[facility]  Hospital Medicine Progress Note        Chief Complaint: Inpatient Follow-up for     HPI: 82-year-old female medical history of Alzheimer dementia, hypertension hyperlipidemia presented to the ED with family reporting worsening confusion over the past 2 weeks.  History obtained from patient and her daughter at bedside.  Report her symptoms started with difficulty sleeping, increasing disorientation and confusion and lethargy at times, she was diagnosed with UTI on 5/12 and prescribed Macrobid, her symptom did not improve and today was prescribed Bactrim.  Daughter also reports she has worsening cough and complaining of pain to her right side of abdomen/chest intermittently over the past 2 weeks.     On arrival to ED she was afebrile and hemodynamically stable saturating 95% on room air.  CT head showed no acute intracranial findings.  Chest x-ray show right lower lobe atelectasis/pleural effusion.  CT abdomen and pelvis without contrast notable for right lower lobe atelectasis versus consolidation with small right-sided pleural effusion.  Labs notable for WBC 15.8, normal lactic acid, urinalysis unremarkable.     She was given 500 mL normal saline and 1 g ceftriaxone and referred to hospital medicine service for further evaluation and management.       Interval Hx:     Patient seen and examined this morning with patient's daughters bedside daughter reported that patient was talking to herself in the night still on and of abdominal pain last bowel movement was 4-5 days back    Objective/physical exam:  General: In no acute distress, afebrile  Chest: Clear to auscultation bilaterally  Heart: RRR, +S1, S2, no appreciable murmur  Abdomen: Soft,  mild tenderness around the suprapubic region  MSK: Warm, no lower extremity edema, no clubbing or cyanosis  Neurologic: Alert and oriented x4,   VITAL SIGNS: 24 HRS MIN & MAX LAST   Temp  Min: 97.3 °F (36.3 °C)  Max: 98.5 °F (36.9 °C)  97.7 °F (36.5 °C)   BP  Min: 90/60  Max: 149/73 (!) 143/85   Pulse  Min: 84  Max: 104  104   Resp  Min: 18  Max: 20 18   SpO2  Min: 93 %  Max: 94 % (!) 93 %       Recent Labs   Lab 05/18/23  1336 05/19/23  0544 05/20/23  0513   WBC 15.81* 12.56* 11.06   RBC 4.12* 4.36 3.74*   HGB 13.3 13.6 12.0   HCT 39.8 41.2 36.0*   MCV 96.6* 94.5* 96.3*   MCH 32.3* 31.2* 32.1*   MCHC 33.4 33.0 33.3   RDW 13.8 13.5 13.3    185 190   MPV 12.9* 12.8* 12.7*       Recent Labs   Lab 05/18/23 1336 05/19/23  0544 05/20/23  0513    140 132*   K 4.8 4.4 4.1   CO2 23 22* 19*   BUN 10.9 10.1 17.2   CREATININE 1.07* 0.84 0.87   CALCIUM 10.5* 11.0* 10.1   MG  --  1.90  --    ALBUMIN 3.7 3.4  --    ALKPHOS 118 115  --    ALT 11 12  --    AST 22 24  --    BILITOT 0.6 0.7  --           Microbiology Results (last 7 days)       Procedure Component Value Units Date/Time    Blood Culture [[id number]]  (Normal) Collected: 05/18/23 2119    Order Status: Completed Specimen: Blood from Antecubital, Right Updated: 05/20/23 0200     CULTURE, BLOOD ([facility]) No Growth At 24 Hours    Blood Culture [[id number]]  (Normal) Collected: 05/18/23 2119    Order Status: Completed Specimen: Blood from Arm, Right Updated: 05/20/23 0200     CULTURE, BLOOD ([facility]) No Growth At 24 Hours             See below for Radiology    Scheduled Med:   aspirin  81 mg Oral Daily    azithromycin  500 mg Intravenous Q24H    cefTRIAXone (ROCEPHIN) IVPB  1 g Intravenous Q24H    enoxparin  40 mg Subcutaneous Daily    lisinopriL  20 mg Oral Daily    memantine  10 mg Oral BID    polyethylene glycol  17 g Oral BID    pravastatin  40 mg Oral Daily    QUEtiapine  50 mg Oral QHS    senna-docusate 8.6-50 mg  2 tablet Oral BID        Continuous Infusions:         PRN Meds:  acetaminophen, acetaminophen, aluminum-magnesium hydroxide-simethicone, benzonatate, dextromethorphan-guaiFENesin  mg/5 ml, melatonin, ondansetron, sodium chloride 0.9%       Assessment/Plan:   Right lower lobe  community-acquired pneumonia   Acute delirium superimposed on dementia   Possible UTI?    History of dementia   essential hypertension   Hyperlipidemia    COVID influenza RSV all negative, get MRSA and respiratory PCR, UA was negative on admission  CT of the abdomen had shown concern about possible cystitis even though UA has been negative but patient is already on Rocephin so that should take care of it  Continue with Rocephin and azithromycin, white cell count is improving, blood cultures have been negative until now  Will also start on Colace and MiraLax for constipation we will make it scheduled as that was not given yesterday  I will also order flat and erect x-ray   Home medications have been resumed that includes lisinopril, aspirin, memantine, pravastatin and Seroquel  VTE prophylaxis:  Lovenox    Patient condition:  Stable/Fair/Guarded/ Serious/ Critical    Anticipated discharge and Disposition:         All diagnosis and differential diagnosis have been reviewed; assessment and plan has been documented; I have personally reviewed the labs and test results that are presently available; I have reviewed the patients medication list; I have reviewed the consulting providers response and recommendations. I have reviewed or attempted to review medical records based upon their availability    All of the patient's questions have been  addressed and answered. Patient's is agreeable to the above stated plan. I will continue to monitor closely and make adjustments to medical management as needed.  _____________________________________________________________________    Nutrition Status:    Radiology:  CT Abdomen Pelvis  Without Contrast  Narrative: EXAMINATION:  CT ABDOMEN PELVIS WITHOUT CONTRAST    CLINICAL HISTORY:  LLQ abdominal pain;    TECHNIQUE:  Low dose axial images, sagittal and coronal reformations were obtained from the lung bases to the pubic symphysis.  No contrast was administered.  Automatic exposure  control is utilized to reduce patient radiation exposure.    COMPARISON:  11/13/2022    FINDINGS:  There is right lower lobe atelectasis versus developing infiltrate.  There is a small right-sided pleural effusion..    The liver appears normal.  No obvious liver mass or lesion is seen.    Gallbladder appears normal.  No gallstones are seen.    The pancreas appears normal.  No inflammatory changes are seen in the pancreatic region.    The spleen appears normal and adrenal glands appear normal.  No adrenal nodule is seen.    No nephrolithiasis is seen.  No hydronephrosis is seen.  No hydroureter is seen.  No ureteral stone is seen.    There is a infrarenal abdominal aortic aneurysm seen that measures 3.9 cm.    No colitis is seen.  There are multiple diverticuli in sigmoid colon but no diverticulitis is seen.  No appendicitis is seen.    No free air seen.  No free fluid is seen.    There is some bladder diverticuli seen on the right and left side inferiorly.  Some inflammatory changes are seen along the dome of the bladder possibly representing cystitis.  Correlation with urinalysis is recommended.  There is also a small bubble of air in the dome of the urinary bladder..    Bones show no acute abnormality.  Impression: Small amount inflammatory change involving the urinary bladder with a bubble of air within it appears cystitis should be excluded.    Infrarenal abdominal aortic aneurysm    Right lower lobe atelectasis versus developing infiltrate with a small right-sided pleural effusion    Electronically signed by: Dyana Jacobs  Date:    05/18/2023  Time:    18:22  CT Head Without Contrast  Narrative: EXAMINATION:  CT HEAD WITHOUT CONTRAST    CLINICAL HISTORY:  Mental status change, unknown cause;    TECHNIQUE:  Low dose axial CT images obtained throughout the head without intravenous contrast.  Axial, sagittal and coronal reconstructions were performed and interpreted.    DLP: 1061 mGycm    All CT scans at  this location are performed using dose optimization techniques as appropriate to a performed exam including the following automated exposure control, adjustment of the mA and/or kV according to patient size and/or use of iterative reconstruction technique    COMPARISON:  CT head 05/12/2023    FINDINGS:  BRAIN: Gray white differentiation is maintained. Periventricular and subcortical white matter changes most compatible with chronic small vessel ischemic disease.  Unchanged hypodensity at the right internal capsule.  Moderate cerebral atrophy.  No hemorrhage. No edema. No mass effect or midline shift.  The posterior fossa and midline structures are unremarkable.    VENTRICLES: Normal in size and configuration.  Choroid plexus calcifications.    EXTRA-AXIAL: Ex vacuo expansion of the extra-axial spaces.  No appreciable extra-axial hemorrhage.    BONES: Calvarium is intact.    SINUSES AND MASTOIDS: Visualized paranasal sinuses and mastoid air cells are clear.  Impression: No appreciable acute intracranial abnormality.    Moderate cerebral atrophy with periventricular and subcortical white matter changes most compatible with chronic small vessel ischemic disease.    Electronically signed by: Rosmery Cardozo  Date:    05/18/2023  Time:    18:18  X-Ray Chest AP Portable  Narrative: EXAMINATION:  XR CHEST AP PORTABLE    CLINICAL HISTORY:  Altered mental status;    TECHNIQUE:  Single frontal view of the chest was performed.    COMPARISON:  05/12/2023    FINDINGS:  There is some atelectasis in the lung bases.  There has been interval development of small right-sided pleural effusion.  The heart is slightly enlarged in size.  Aorta is ectatic.  Bones and joints show no acute abnormality.  Impression: Interval development some right lower lobe atelectasis and small right-sided pleural effusion.  Follow-up is recommended to exclude developing infiltrate    Electronically signed by: Dyana  Reynaldo  Date:    05/18/2023  Time:    16:25      Gwendolyn Davis MD   05/20/2023

## 2023-05-21 LAB
BASOPHILS # BLD AUTO: 0.05 X10(3)/MCL
BASOPHILS NFR BLD AUTO: 0.5 %
EOSINOPHIL # BLD AUTO: 0.55 X10(3)/MCL (ref 0–0.9)
EOSINOPHIL NFR BLD AUTO: 5.2 %
ERYTHROCYTE [DISTWIDTH] IN BLOOD BY AUTOMATED COUNT: 13.3 % (ref 11.5–17)
HCT VFR BLD AUTO: 36.4 % (ref 37–47)
HGB BLD-MCNC: 12.2 G/DL (ref 12–16)
IMM GRANULOCYTES # BLD AUTO: 0.12 X10(3)/MCL (ref 0–0.04)
IMM GRANULOCYTES NFR BLD AUTO: 1.1 %
LYMPHOCYTES # BLD AUTO: 2.77 X10(3)/MCL (ref 0.6–4.6)
LYMPHOCYTES NFR BLD AUTO: 26 %
MCH RBC QN AUTO: 32.4 PG (ref 27–31)
MCHC RBC AUTO-ENTMCNC: 33.5 G/DL (ref 33–36)
MCV RBC AUTO: 96.6 FL (ref 80–94)
MONOCYTES # BLD AUTO: 0.95 X10(3)/MCL (ref 0.1–1.3)
MONOCYTES NFR BLD AUTO: 8.9 %
NEUTROPHILS # BLD AUTO: 6.21 X10(3)/MCL (ref 2.1–9.2)
NEUTROPHILS NFR BLD AUTO: 58.3 %
NRBC BLD AUTO-RTO: 0 %
PLATELET # BLD AUTO: 243 X10(3)/MCL (ref 130–400)
PMV BLD AUTO: 12.8 FL (ref 7.4–10.4)
RBC # BLD AUTO: 3.77 X10(6)/MCL (ref 4.2–5.4)
WBC # SPEC AUTO: 10.65 X10(3)/MCL (ref 4.5–11.5)

## 2023-05-21 PROCEDURE — 25000003 PHARM REV CODE 250: Performed by: INTERNAL MEDICINE

## 2023-05-21 PROCEDURE — 11000001 HC ACUTE MED/SURG PRIVATE ROOM

## 2023-05-21 PROCEDURE — 63600175 PHARM REV CODE 636 W HCPCS: Performed by: INTERNAL MEDICINE

## 2023-05-21 PROCEDURE — 85025 COMPLETE CBC W/AUTO DIFF WBC: CPT | Performed by: INTERNAL MEDICINE

## 2023-05-21 RX ORDER — BISACODYL 10 MG
10 SUPPOSITORY, RECTAL RECTAL DAILY PRN
Status: DISCONTINUED | OUTPATIENT
Start: 2023-05-21 | End: 2023-05-25 | Stop reason: HOSPADM

## 2023-05-21 RX ORDER — LACTULOSE 10 G/15ML
10 SOLUTION ORAL 2 TIMES DAILY
Status: DISCONTINUED | OUTPATIENT
Start: 2023-05-21 | End: 2023-05-23

## 2023-05-21 RX ORDER — LISINOPRIL 5 MG/1
5 TABLET ORAL DAILY
Status: DISCONTINUED | OUTPATIENT
Start: 2023-05-21 | End: 2023-05-25 | Stop reason: HOSPADM

## 2023-05-21 RX ADMIN — ENOXAPARIN SODIUM 40 MG: 40 INJECTION SUBCUTANEOUS at 04:05

## 2023-05-21 RX ADMIN — ASPIRIN 81 MG: 81 TABLET, COATED ORAL at 08:05

## 2023-05-21 RX ADMIN — BENZONATATE 200 MG: 100 CAPSULE ORAL at 06:05

## 2023-05-21 RX ADMIN — BISACODYL 10 MG: 10 SUPPOSITORY RECTAL at 04:05

## 2023-05-21 RX ADMIN — POLYETHYLENE GLYCOL 3350 17 G: 17 POWDER, FOR SOLUTION ORAL at 08:05

## 2023-05-21 RX ADMIN — MEMANTINE 10 MG: 5 TABLET ORAL at 08:05

## 2023-05-21 RX ADMIN — SENNOSIDES AND DOCUSATE SODIUM 2 TABLET: 50; 8.6 TABLET ORAL at 08:05

## 2023-05-21 RX ADMIN — AZITHROMYCIN MONOHYDRATE 500 MG: 500 INJECTION, POWDER, LYOPHILIZED, FOR SOLUTION INTRAVENOUS at 08:05

## 2023-05-21 RX ADMIN — PRAVASTATIN SODIUM 40 MG: 40 TABLET ORAL at 08:05

## 2023-05-21 RX ADMIN — CEFTRIAXONE SODIUM 1 G: 1 INJECTION, POWDER, FOR SOLUTION INTRAMUSCULAR; INTRAVENOUS at 02:05

## 2023-05-21 RX ADMIN — LACTULOSE 10 G: 10 SOLUTION ORAL at 12:05

## 2023-05-21 RX ADMIN — LACTULOSE 10 G: 10 SOLUTION ORAL at 08:05

## 2023-05-21 RX ADMIN — QUETIAPINE FUMARATE 50 MG: 25 TABLET ORAL at 08:05

## 2023-05-21 NOTE — PROGRESS NOTES
[facility]  Hospital Medicine Progress Note        Chief Complaint: Inpatient Follow-up for     HPI: 82-year-old female medical history of Alzheimer dementia, hypertension hyperlipidemia presented to the ED with family reporting worsening confusion over the past 2 weeks.  History obtained from patient and her daughter at bedside.  Report her symptoms started with difficulty sleeping, increasing disorientation and confusion and lethargy at times, she was diagnosed with UTI on 5/12 and prescribed Macrobid, her symptom did not improve and today was prescribed Bactrim.  Daughter also reports she has worsening cough and complaining of pain to her right side of abdomen/chest intermittently over the past 2 weeks.     On arrival to ED she was afebrile and hemodynamically stable saturating 95% on room air.  CT head showed no acute intracranial findings.  Chest x-ray show right lower lobe atelectasis/pleural effusion.  CT abdomen and pelvis without contrast notable for right lower lobe atelectasis versus consolidation with small right-sided pleural effusion.  Labs notable for WBC 15.8, normal lactic acid, urinalysis unremarkable.     She was given 500 mL normal saline and 1 g ceftriaxone and referred to hospital medicine service for further evaluation and management.       Interval Hx:     Patient seen and examined this morning with patient's son bedside still no bowel movement apparently patient had bowel movement almost 1 week back.  Patient was started on Colace and MiraLax otherwise son reported patient had a good night sleep      Objective/physical exam:  General: In no acute distress, afebrile  Chest: Clear to auscultation bilaterally  Heart: RRR, +S1, S2, no appreciable murmur  Abdomen: Soft,  mild tenderness around the suprapubic region  MSK: Warm, no lower extremity edema, no clubbing or cyanosis  Neurologic: Alert and awake   VITAL SIGNS: 24 HRS MIN & MAX LAST   Temp  Min: 97.8 °F (36.6  °C)  Max: 98.9 °F (37.2 °C) 98.8 °F (37.1 °C)   BP  Min: 90/54  Max: 138/77 138/77   Pulse  Min: 72  Max: 87  87   Resp  Min: 18  Max: 20 18   SpO2  Min: 92 %  Max: 95 % (!) 92 %       Recent Labs   Lab 05/19/23  0544 05/20/23  0513 05/21/23  0522   WBC 12.56* 11.06 10.65   RBC 4.36 3.74* 3.77*   HGB 13.6 12.0 12.2   HCT 41.2 36.0* 36.4*   MCV 94.5* 96.3* 96.6*   MCH 31.2* 32.1* 32.4*   MCHC 33.0 33.3 33.5   RDW 13.5 13.3 13.3    190 243   MPV 12.8* 12.7* 12.8*       Recent Labs   Lab 05/18/23  1336 05/19/23  0544 05/20/23  0513    140 132*   K 4.8 4.4 4.1   CO2 23 22* 19*   BUN 10.9 10.1 17.2   CREATININE 1.07* 0.84 0.87   CALCIUM 10.5* 11.0* 10.1   MG  --  1.90  --    ALBUMIN 3.7 3.4  --    ALKPHOS 118 115  --    ALT 11 12  --    AST 22 24  --    BILITOT 0.6 0.7  --           Microbiology Results (last 7 days)       Procedure Component Value Units Date/Time    Blood Culture [[id number]]  (Normal) Collected: 05/18/23 2119    Order Status: Completed Specimen: Blood from Arm, Right Updated: 05/21/23 0200     CULTURE, BLOOD ([facility]) No Growth At 48 Hours    Blood Culture [[id number]]  (Normal) Collected: 05/18/23 2119    Order Status: Completed Specimen: Blood from Antecubital, Right Updated: 05/21/23 0200     CULTURE, BLOOD ([facility]) No Growth At 48 Hours             See below for Radiology    Scheduled Med:   aspirin  81 mg Oral Daily    azithromycin  500 mg Intravenous Q24H    cefTRIAXone (ROCEPHIN) IVPB  1 g Intravenous Q24H    enoxparin  40 mg Subcutaneous Daily    lisinopriL  5 mg Oral Daily    memantine  10 mg Oral BID    polyethylene glycol  17 g Oral BID    pravastatin  40 mg Oral Daily    QUEtiapine  50 mg Oral QHS    senna-docusate 8.6-50 mg  2 tablet Oral BID        Continuous Infusions:         PRN Meds:  acetaminophen, acetaminophen, aluminum-magnesium hydroxide-simethicone, benzonatate, bisacodyL, dextromethorphan-guaiFENesin  mg/5 ml, melatonin, ondansetron, sodium chloride 0.9%        Assessment/Plan:   Right lower lobe community-acquired pneumonia   Acute delirium superimposed on dementia   Possible UTI?    History of dementia   essential hypertension   Hyperlipidemia  Hypercalcemia resolved      Yesterday patient's blood pressure was slightly low so we had to bolus her I have reduced the dose of lisinopril to 5 mg daily  Still no bowel movement continue with Colace MiraLax and will give Dulcolax and lactulose hopefully patient can have a bowel movement, flat and erect x-ray was negative  COVID influenza RSV all negative, get MRSA and respiratory PCR, UA was negative on admission  CT of the abdomen had shown concern about possible cystitis even though UA has been negative but patient is already on Rocephin so that should take care of it  Continue with Rocephin and azithromycin, white cell count is improving, blood cultures have been negative until now   Home medications have been resumed that includes lisinopril, aspirin, memantine, pravastatin and Seroquel  VTE prophylaxis:  Lovenox    Patient condition:  Stable/Fair/Guarded/ Serious/ Critical    Anticipated discharge and Disposition:         All diagnosis and differential diagnosis have been reviewed; assessment and plan has been documented; I have personally reviewed the labs and test results that are presently available; I have reviewed the patients medication list; I have reviewed the consulting providers response and recommendations. I have reviewed or attempted to review medical records based upon their availability    All of the patient's questions have been  addressed and answered. Patient's is agreeable to the above stated plan. I will continue to monitor closely and make adjustments to medical management as needed.  _____________________________________________________________________    Nutrition Status:    Radiology:  X-Ray Abdomen Flat And Erect  Narrative: EXAMINATION:  XR ABDOMEN FLAT AND ERECT    CLINICAL  HISTORY:  abdominal pain;    TECHNIQUE:  Flat and erect AP views of the abdomen were performed.    COMPARISON:  None    FINDINGS:  Bowel gas pattern is unremarkable.  There are no dilated loops of small bowel seen.  No free air is noted.  No nephrolithiasis is seen.  Impression: No radiographic evidence of acute intra-abdominal disease    Electronically signed by: Rajeev Keene MD  Date:    05/20/2023  Time:    10:13      Gwendolyn Davis MD   05/21/2023

## 2023-05-21 NOTE — NURSING
Could not get patient to drink miralax - could only get her to allow me to give her meds mixed in a small amount of apple juice and administered through a syringe.  She still had a full glass of the miralax left on bedside table from earlier dose.  She did take the senakot though.

## 2023-05-21 NOTE — NURSING
Could not get patient to take 2100 dose of miralax - she would only allow me to give her evening meds with a small amount of apple juice mixed with meds and administered through a syringe. She did get the senakot however.

## 2023-05-22 LAB
ANION GAP SERPL CALC-SCNC: 8 MEQ/L
BASOPHILS # BLD AUTO: 0.06 X10(3)/MCL
BASOPHILS NFR BLD AUTO: 0.5 %
BUN SERPL-MCNC: 16.8 MG/DL (ref 9.8–20.1)
CALCIUM SERPL-MCNC: 10.4 MG/DL (ref 8.4–10.2)
CHLORIDE SERPL-SCNC: 107 MMOL/L (ref 98–107)
CO2 SERPL-SCNC: 23 MMOL/L (ref 23–31)
CREAT SERPL-MCNC: 0.86 MG/DL (ref 0.55–1.02)
CREAT/UREA NIT SERPL: 20
EOSINOPHIL # BLD AUTO: 0.42 X10(3)/MCL (ref 0–0.9)
EOSINOPHIL NFR BLD AUTO: 3.8 %
ERYTHROCYTE [DISTWIDTH] IN BLOOD BY AUTOMATED COUNT: 13.2 % (ref 11.5–17)
GFR SERPLBLD CREATININE-BSD FMLA CKD-EPI: >60 MLS/MIN/1.73/M2
GLUCOSE SERPL-MCNC: 104 MG/DL (ref 82–115)
HCT VFR BLD AUTO: 36.8 % (ref 37–47)
HGB BLD-MCNC: 12 G/DL (ref 12–16)
IMM GRANULOCYTES # BLD AUTO: 0.11 X10(3)/MCL (ref 0–0.04)
IMM GRANULOCYTES NFR BLD AUTO: 1 %
LYMPHOCYTES # BLD AUTO: 2.49 X10(3)/MCL (ref 0.6–4.6)
LYMPHOCYTES NFR BLD AUTO: 22.6 %
MCH RBC QN AUTO: 31.7 PG (ref 27–31)
MCHC RBC AUTO-ENTMCNC: 32.6 G/DL (ref 33–36)
MCV RBC AUTO: 97.4 FL (ref 80–94)
MONOCYTES # BLD AUTO: 1 X10(3)/MCL (ref 0.1–1.3)
MONOCYTES NFR BLD AUTO: 9.1 %
NEUTROPHILS # BLD AUTO: 6.93 X10(3)/MCL (ref 2.1–9.2)
NEUTROPHILS NFR BLD AUTO: 63 %
NRBC BLD AUTO-RTO: 0 %
PLATELET # BLD AUTO: 259 X10(3)/MCL (ref 130–400)
PMV BLD AUTO: 13 FL (ref 7.4–10.4)
POTASSIUM SERPL-SCNC: 4.6 MMOL/L (ref 3.5–5.1)
PTH-INTACT SERPL-MCNC: 54.4 PG/ML (ref 8.7–77)
RBC # BLD AUTO: 3.78 X10(6)/MCL (ref 4.2–5.4)
SODIUM SERPL-SCNC: 138 MMOL/L (ref 136–145)
WBC # SPEC AUTO: 11.01 X10(3)/MCL (ref 4.5–11.5)

## 2023-05-22 PROCEDURE — 85025 COMPLETE CBC W/AUTO DIFF WBC: CPT | Performed by: INTERNAL MEDICINE

## 2023-05-22 PROCEDURE — 80048 BASIC METABOLIC PNL TOTAL CA: CPT | Performed by: INTERNAL MEDICINE

## 2023-05-22 PROCEDURE — 25000003 PHARM REV CODE 250: Performed by: INTERNAL MEDICINE

## 2023-05-22 PROCEDURE — 11000001 HC ACUTE MED/SURG PRIVATE ROOM

## 2023-05-22 PROCEDURE — 83970 ASSAY OF PARATHORMONE: CPT | Performed by: INTERNAL MEDICINE

## 2023-05-22 PROCEDURE — 63600175 PHARM REV CODE 636 W HCPCS: Performed by: INTERNAL MEDICINE

## 2023-05-22 RX ADMIN — POLYETHYLENE GLYCOL 3350 17 G: 17 POWDER, FOR SOLUTION ORAL at 09:05

## 2023-05-22 RX ADMIN — CEFTRIAXONE SODIUM 1 G: 1 INJECTION, POWDER, FOR SOLUTION INTRAMUSCULAR; INTRAVENOUS at 03:05

## 2023-05-22 RX ADMIN — ENOXAPARIN SODIUM 40 MG: 40 INJECTION SUBCUTANEOUS at 04:05

## 2023-05-22 RX ADMIN — SENNOSIDES AND DOCUSATE SODIUM 2 TABLET: 50; 8.6 TABLET ORAL at 09:05

## 2023-05-22 RX ADMIN — POLYETHYLENE GLYCOL 3350 17 G: 17 POWDER, FOR SOLUTION ORAL at 08:05

## 2023-05-22 RX ADMIN — SENNOSIDES AND DOCUSATE SODIUM 2 TABLET: 50; 8.6 TABLET ORAL at 08:05

## 2023-05-22 RX ADMIN — AZITHROMYCIN MONOHYDRATE 500 MG: 500 INJECTION, POWDER, LYOPHILIZED, FOR SOLUTION INTRAVENOUS at 08:05

## 2023-05-22 RX ADMIN — MEMANTINE 10 MG: 5 TABLET ORAL at 08:05

## 2023-05-22 RX ADMIN — ASPIRIN 81 MG: 81 TABLET, COATED ORAL at 09:05

## 2023-05-22 RX ADMIN — LACTULOSE 10 G: 10 SOLUTION ORAL at 08:05

## 2023-05-22 RX ADMIN — LISINOPRIL 5 MG: 5 TABLET ORAL at 09:05

## 2023-05-22 RX ADMIN — LACTULOSE 10 G: 10 SOLUTION ORAL at 09:05

## 2023-05-22 RX ADMIN — Medication 6 MG: at 08:05

## 2023-05-22 RX ADMIN — QUETIAPINE FUMARATE 50 MG: 25 TABLET ORAL at 08:05

## 2023-05-22 RX ADMIN — MEMANTINE 10 MG: 5 TABLET ORAL at 09:05

## 2023-05-22 RX ADMIN — PRAVASTATIN SODIUM 40 MG: 40 TABLET ORAL at 09:05

## 2023-05-22 NOTE — PROGRESS NOTES
[facility]  Hospital Medicine Progress Note        Chief Complaint: Inpatient Follow-up for     HPI: 82-year-old female medical history of Alzheimer dementia, hypertension hyperlipidemia presented to the ED with family reporting worsening confusion over the past 2 weeks.  History obtained from patient and her daughter at bedside.  Report her symptoms started with difficulty sleeping, increasing disorientation and confusion and lethargy at times, she was diagnosed with UTI on 5/12 and prescribed Macrobid, her symptom did not improve and today was prescribed Bactrim.  Daughter also reports she has worsening cough and complaining of pain to her right side of abdomen/chest intermittently over the past 2 weeks.     On arrival to ED she was afebrile and hemodynamically stable saturating 95% on room air.  CT head showed no acute intracranial findings.  Chest x-ray show right lower lobe atelectasis/pleural effusion.  CT abdomen and pelvis without contrast notable for right lower lobe atelectasis versus consolidation with small right-sided pleural effusion.  Labs notable for WBC 15.8, normal lactic acid, urinalysis unremarkable.     She was given 500 mL normal saline and 1 g ceftriaxone and referred to hospital medicine service for further evaluation and management.       Interval Hx:     Patient seen and examined this morning awake and alert daughter bedside had a very small bowel movement yesterday     Objective/physical exam:  General: In no acute distress, afebrile  Chest: Clear to auscultation bilaterally  Heart: RRR, +S1, S2, no appreciable murmur  Abdomen: Soft,  mild tenderness around the suprapubic region  MSK: Warm, no lower extremity edema, no clubbing or cyanosis  Neurologic: Alert and awake   VITAL SIGNS: 24 HRS MIN & MAX LAST   Temp  Min: 98 °F (36.7 °C)  Max: 99.3 °F (37.4 °C) 98 °F (36.7 °C)   BP  Min: 121/69  Max: 155/82 137/79   Pulse  Min: 86  Max: 95  94   Resp  Min: 18  Max:  18 18   SpO2  Min: 91 %  Max: 94 % (!) 92 %       Recent Labs   Lab 05/20/23  0513 05/21/23  0522 05/22/23  0545   WBC 11.06 10.65 11.01   RBC 3.74* 3.77* 3.78*   HGB 12.0 12.2 12.0   HCT 36.0* 36.4* 36.8*   MCV 96.3* 96.6* 97.4*   MCH 32.1* 32.4* 31.7*   MCHC 33.3 33.5 32.6*   RDW 13.3 13.3 13.2    243 259   MPV 12.7* 12.8* 13.0*       Recent Labs   Lab 05/18/23  1336 05/19/23  0544 05/20/23  0513 05/22/23  0545    140 132* 138   K 4.8 4.4 4.1 4.6   CO2 23 22* 19* 23   BUN 10.9 10.1 17.2 16.8   CREATININE 1.07* 0.84 0.87 0.86   CALCIUM 10.5* 11.0* 10.1 10.4*   MG  --  1.90  --   --    ALBUMIN 3.7 3.4  --   --    ALKPHOS 118 115  --   --    ALT 11 12  --   --    AST 22 24  --   --    BILITOT 0.6 0.7  --   --           Microbiology Results (last 7 days)       Procedure Component Value Units Date/Time    Blood Culture [[id number]]  (Normal) Collected: 05/18/23 2119    Order Status: Completed Specimen: Blood from Antecubital, Right Updated: 05/22/23 0200     CULTURE, BLOOD ([facility]) No Growth At 72 Hours    Blood Culture [[id number]]  (Normal) Collected: 05/18/23 2119    Order Status: Completed Specimen: Blood from Arm, Right Updated: 05/22/23 0200     CULTURE, BLOOD ([facility]) No Growth At 72 Hours             See below for Radiology    Scheduled Med:   aspirin  81 mg Oral Daily    azithromycin  500 mg Intravenous Q24H    cefTRIAXone (ROCEPHIN) IVPB  1 g Intravenous Q24H    enoxparin  40 mg Subcutaneous Daily    lactulose 10 gram/15 ml  10 g Oral BID    lisinopriL  5 mg Oral Daily    memantine  10 mg Oral BID    polyethylene glycol  17 g Oral BID    pravastatin  40 mg Oral Daily    QUEtiapine  50 mg Oral QHS    senna-docusate 8.6-50 mg  2 tablet Oral BID        Continuous Infusions:         PRN Meds:  acetaminophen, acetaminophen, aluminum-magnesium hydroxide-simethicone, benzonatate, bisacodyL, dextromethorphan-guaiFENesin  mg/5 ml, melatonin, ondansetron, sodium chloride 0.9%       Assessment/Plan:   Right  lower lobe community-acquired pneumonia   Acute delirium superimposed on dementia   Possible UTI?    History of dementia   essential hypertension   Hyperlipidemia  Hypercalcemia resolved    Blood pressure is stable still did not have bowel movement  Will give Colace, MiraLax, lactulose and Dulcolax  Flat and erect x-ray was negative COVID influenza RSV all negative, get MRSA and respiratory PCR, UA was negative on admission  CT of the abdomen had shown concern about possible cystitis even though UA has been negative but patient is already on Rocephin so that should take care of it  Continue with Rocephin and azithromycin, white cell count is improving, blood cultures have been negative until now  Home medications have been resumed that includes lisinopril, aspirin, memantine, pravastatin and Seroquel  calcium slightly elevated than yesterday will order intact PTH  Will consult PT    VTE prophylaxis:  Lovenox    Patient condition:  Stable    Anticipated discharge and Disposition:         All diagnosis and differential diagnosis have been reviewed; assessment and plan has been documented; I have personally reviewed the labs and test results that are presently available; I have reviewed the patients medication list; I have reviewed the consulting providers response and recommendations. I have reviewed or attempted to review medical records based upon their availability    All of the patient's questions have been  addressed and answered. Patient's is agreeable to the above stated plan. I will continue to monitor closely and make adjustments to medical management as needed.  _____________________________________________________________________    Nutrition Status:    Radiology:  X-Ray Abdomen Flat And Erect  Narrative: EXAMINATION:  XR ABDOMEN FLAT AND ERECT    CLINICAL HISTORY:  abdominal pain;    TECHNIQUE:  Flat and erect AP views of the abdomen were performed.    COMPARISON:  None    FINDINGS:  Bowel gas pattern is  unremarkable.  There are no dilated loops of small bowel seen.  No free air is noted.  No nephrolithiasis is seen.  Impression: No radiographic evidence of acute intra-abdominal disease    Electronically signed by: Rajeev Keene MD  Date:    05/20/2023  Time:    10:13      Gwendolyn Davis MD   05/22/2023

## 2023-05-23 LAB
ANION GAP SERPL CALC-SCNC: 9 MEQ/L
BASOPHILS # BLD AUTO: 0.07 X10(3)/MCL
BASOPHILS NFR BLD AUTO: 0.7 %
BUN SERPL-MCNC: 18.9 MG/DL (ref 9.8–20.1)
CALCIUM SERPL-MCNC: 10.5 MG/DL (ref 8.4–10.2)
CHLORIDE SERPL-SCNC: 106 MMOL/L (ref 98–107)
CO2 SERPL-SCNC: 24 MMOL/L (ref 23–31)
CREAT SERPL-MCNC: 0.78 MG/DL (ref 0.55–1.02)
CREAT/UREA NIT SERPL: 24
EOSINOPHIL # BLD AUTO: 0.55 X10(3)/MCL (ref 0–0.9)
EOSINOPHIL NFR BLD AUTO: 5.2 %
ERYTHROCYTE [DISTWIDTH] IN BLOOD BY AUTOMATED COUNT: 13.2 % (ref 11.5–17)
GFR SERPLBLD CREATININE-BSD FMLA CKD-EPI: >60 MLS/MIN/1.73/M2
GLUCOSE SERPL-MCNC: 90 MG/DL (ref 82–115)
HCT VFR BLD AUTO: 37.5 % (ref 37–47)
HGB BLD-MCNC: 12.4 G/DL (ref 12–16)
IMM GRANULOCYTES # BLD AUTO: 0.12 X10(3)/MCL (ref 0–0.04)
IMM GRANULOCYTES NFR BLD AUTO: 1.1 %
LYMPHOCYTES # BLD AUTO: 2.42 X10(3)/MCL (ref 0.6–4.6)
LYMPHOCYTES NFR BLD AUTO: 23 %
MCH RBC QN AUTO: 32.3 PG (ref 27–31)
MCHC RBC AUTO-ENTMCNC: 33.1 G/DL (ref 33–36)
MCV RBC AUTO: 97.7 FL (ref 80–94)
MONOCYTES # BLD AUTO: 0.88 X10(3)/MCL (ref 0.1–1.3)
MONOCYTES NFR BLD AUTO: 8.4 %
NEUTROPHILS # BLD AUTO: 6.47 X10(3)/MCL (ref 2.1–9.2)
NEUTROPHILS NFR BLD AUTO: 61.6 %
NRBC BLD AUTO-RTO: 0 %
PLATELET # BLD AUTO: 265 X10(3)/MCL (ref 130–400)
PMV BLD AUTO: 12.6 FL (ref 7.4–10.4)
POTASSIUM SERPL-SCNC: 4.6 MMOL/L (ref 3.5–5.1)
RBC # BLD AUTO: 3.84 X10(6)/MCL (ref 4.2–5.4)
SODIUM SERPL-SCNC: 139 MMOL/L (ref 136–145)
WBC # SPEC AUTO: 10.51 X10(3)/MCL (ref 4.5–11.5)

## 2023-05-23 PROCEDURE — 25000003 PHARM REV CODE 250: Performed by: INTERNAL MEDICINE

## 2023-05-23 PROCEDURE — 63600175 PHARM REV CODE 636 W HCPCS: Performed by: INTERNAL MEDICINE

## 2023-05-23 PROCEDURE — 80048 BASIC METABOLIC PNL TOTAL CA: CPT | Performed by: INTERNAL MEDICINE

## 2023-05-23 PROCEDURE — 97162 PT EVAL MOD COMPLEX 30 MIN: CPT

## 2023-05-23 PROCEDURE — 85025 COMPLETE CBC W/AUTO DIFF WBC: CPT | Performed by: INTERNAL MEDICINE

## 2023-05-23 PROCEDURE — 11000001 HC ACUTE MED/SURG PRIVATE ROOM

## 2023-05-23 RX ORDER — LACTULOSE 10 G/15ML
10 SOLUTION ORAL EVERY 6 HOURS
Status: DISCONTINUED | OUTPATIENT
Start: 2023-05-23 | End: 2023-05-25 | Stop reason: HOSPADM

## 2023-05-23 RX ADMIN — SENNOSIDES AND DOCUSATE SODIUM 2 TABLET: 50; 8.6 TABLET ORAL at 08:05

## 2023-05-23 RX ADMIN — ENOXAPARIN SODIUM 40 MG: 40 INJECTION SUBCUTANEOUS at 05:05

## 2023-05-23 RX ADMIN — MEMANTINE 10 MG: 5 TABLET ORAL at 08:05

## 2023-05-23 RX ADMIN — LACTULOSE 10 G: 10 SOLUTION ORAL at 11:05

## 2023-05-23 RX ADMIN — QUETIAPINE FUMARATE 50 MG: 25 TABLET ORAL at 08:05

## 2023-05-23 RX ADMIN — LACTULOSE 10 G: 10 SOLUTION ORAL at 09:05

## 2023-05-23 RX ADMIN — BISACODYL 10 MG: 10 SUPPOSITORY RECTAL at 02:05

## 2023-05-23 RX ADMIN — LACTULOSE 10 G: 10 SOLUTION ORAL at 08:05

## 2023-05-23 NOTE — PROGRESS NOTES
[facility]  Hospital Medicine Progress Note        Chief Complaint: Inpatient Follow-up for     HPI: 82-year-old female medical history of Alzheimer dementia, hypertension hyperlipidemia presented to the ED with family reporting worsening confusion over the past 2 weeks.  History obtained from patient and her daughter at bedside.  Report her symptoms started with difficulty sleeping, increasing disorientation and confusion and lethargy at times, she was diagnosed with UTI on 5/12 and prescribed Macrobid, her symptom did not improve and today was prescribed Bactrim.  Daughter also reports she has worsening cough and complaining of pain to her right side of abdomen/chest intermittently over the past 2 weeks.     On arrival to ED she was afebrile and hemodynamically stable saturating 95% on room air.  CT head showed no acute intracranial findings.  Chest x-ray show right lower lobe atelectasis/pleural effusion.  CT abdomen and pelvis without contrast notable for right lower lobe atelectasis versus consolidation with small right-sided pleural effusion.  Labs notable for WBC 15.8, normal lactic acid, urinalysis unremarkable.     She was given 500 mL normal saline and 1 g ceftriaxone and referred to hospital medicine service for further evaluation and management.       Interval Hx:     Patient seen and examined this morning awake and alert daughter bedside they reported no bowel movement yesterday     Objective/physical exam:  General: In no acute distress, afebrile  Chest: Clear to auscultation bilaterally  Heart: RRR, +S1, S2, no appreciable murmur  Abdomen: Soft,  mild tenderness around the suprapubic region  MSK: Warm, no lower extremity edema, no clubbing or cyanosis  Neurologic: Alert and awake   VITAL SIGNS: 24 HRS MIN & MAX LAST   Temp  Min: 97.6 °F (36.4 °C)  Max: 98.4 °F (36.9 °C) 97.6 °F (36.4 °C)   BP  Min: 107/73  Max: 141/74 121/66   Pulse  Min: 76  Max: 88  76   Resp  Min: 18   Max: 20 18   SpO2  Min: 91 %  Max: 100 % (!) 91 %       Recent Labs   Lab 05/21/23  0522 05/22/23  0545 05/23/23  0501   WBC 10.65 11.01 10.51   RBC 3.77* 3.78* 3.84*   HGB 12.2 12.0 12.4   HCT 36.4* 36.8* 37.5   MCV 96.6* 97.4* 97.7*   MCH 32.4* 31.7* 32.3*   MCHC 33.5 32.6* 33.1   RDW 13.3 13.2 13.2    259 265   MPV 12.8* 13.0* 12.6*       Recent Labs   Lab 05/18/23  1336 05/19/23  0544 05/20/23  0513 05/22/23  0545 05/23/23  0501    140 132* 138 139   K 4.8 4.4 4.1 4.6 4.6   CO2 23 22* 19* 23 24   BUN 10.9 10.1 17.2 16.8 18.9   CREATININE 1.07* 0.84 0.87 0.86 0.78   CALCIUM 10.5* 11.0* 10.1 10.4* 10.5*   MG  --  1.90  --   --   --    ALBUMIN 3.7 3.4  --   --   --    ALKPHOS 118 115  --   --   --    ALT 11 12  --   --   --    AST 22 24  --   --   --    BILITOT 0.6 0.7  --   --   --           Microbiology Results (last 7 days)       Procedure Component Value Units Date/Time    Blood Culture [[id number]]  (Normal) Collected: 05/18/23 2119    Order Status: Completed Specimen: Blood from Antecubital, Right Updated: 05/23/23 0200     CULTURE, BLOOD ([facility]) No Growth At 96 Hours    Blood Culture [[id number]]  (Normal) Collected: 05/18/23 2119    Order Status: Completed Specimen: Blood from Arm, Right Updated: 05/23/23 0200     CULTURE, BLOOD ([facility]) No Growth At 96 Hours             See below for Radiology    Scheduled Med:   aspirin  81 mg Oral Daily    enoxparin  40 mg Subcutaneous Daily    lactulose 10 gram/15 ml  10 g Oral Q6H    lisinopriL  5 mg Oral Daily    memantine  10 mg Oral BID    polyethylene glycol  17 g Oral BID    pravastatin  40 mg Oral Daily    QUEtiapine  50 mg Oral QHS    senna-docusate 8.6-50 mg  2 tablet Oral BID        Continuous Infusions:         PRN Meds:  acetaminophen, acetaminophen, aluminum-magnesium hydroxide-simethicone, benzonatate, bisacodyL, dextromethorphan-guaiFENesin  mg/5 ml, melatonin, ondansetron, sodium chloride 0.9%       Assessment/Plan:   Right lower lobe  community-acquired pneumonia   Acute delirium superimposed on dementia   Possible UTI?    History of dementia   essential hypertension   Hyperlipidemia  Hypercalcemia resolved    Blood pressure is stable still did not have bowel movement we had ordered flat and erect x-ray today and that showed mild colonic stool burden   I will continue with Colace, MiraLax increase the frequency of lactulose to every 6 hours until patient has a bowel movement and will also give enema   Calcium is slightly elevated intact PTH is normal, will order vitamin-D level possibly secondary to immobilization?  But will have the patient have repeat calcium level in few days outpatient with primary care physician  Will give Colace, MiraLax, lactulose and Dulcolax  Flat and erect x-ray was negative COVID influenza RSV all negative, get MRSA and respiratory PCR, UA was negative on admission  CT of the abdomen had shown concern about possible cystitis even though UA has been negative but patient is already on Rocephin so that should take care of it  Continue with Rocephin and azithromycin, white cell count is improving, blood cultures have been negative until now  Home medications have been resumed that includes lisinopril, aspirin, memantine, pravastatin and Seroquel  Consulted PT yesterday discussed with nursing staff to make sure patient is seen by Physical therapy     Potential discharge home once patient has a bowel movement    Discussed above with patient's daughter who was in the room and patient's granddaughter over the phone    VTE prophylaxis:  Lovenox    Patient condition:  Stable    Anticipated discharge and Disposition:         All diagnosis and differential diagnosis have been reviewed; assessment and plan has been documented; I have personally reviewed the labs and test results that are presently available; I have reviewed the patients medication list; I have reviewed the consulting providers response and recommendations. I have  reviewed or attempted to review medical records based upon their availability    All of the patient's questions have been  addressed and answered. Patient's is agreeable to the above stated plan. I will continue to monitor closely and make adjustments to medical management as needed.  _____________________________________________________________________    Nutrition Status:    Radiology:  X-Ray Abdomen Flat And Erect  Narrative: EXAMINATION:  XR ABDOMEN FLAT AND ERECT    CLINICAL HISTORY:  CONSTIPATION;    TECHNIQUE:  Supine and upright views of the abdomen performed.    COMPARISON:  05/20/2023    FINDINGS:  No dilated bowel loops. No evidence of obstruction.Mild colonic stool burden.    No evidence of free intraperitoneal air.    No appreciable acute osseous abnormality.  Impression: Mild colonic stool burden.    Electronically signed by: Rosmery Cardozo  Date:    05/23/2023  Time:    12:38      Gwendolyn Davis MD   05/23/2023

## 2023-05-23 NOTE — PLAN OF CARE
Problem: Physical Therapy  Goal: Physical Therapy Goal  Description: Goals to be met by: 23     Patient will increase functional independence with mobility by performin. Supine to sit with Modified Harper  2. Bed to chair transfer with Supervision using No Assistive Device  3. Gait  x 150 feet with Supervision using No Assistive Device.     Outcome: Ongoing, Progressing

## 2023-05-23 NOTE — PLAN OF CARE
Problem: Fluid Imbalance (Pneumonia)  Goal: Fluid Balance  Outcome: Ongoing, Progressing     Problem: Infection (Pneumonia)  Goal: Resolution of Infection Signs and Symptoms  Outcome: Ongoing, Progressing

## 2023-05-23 NOTE — PT/OT/SLP EVAL
Physical Therapy Evaluation    Patient Name:  Guillermina Stewart   MRN:  [id number]    Recommendations:     Discharge Recommendations: other (see comments) (home with family)   Discharge Equipment Recommendations: none   Barriers to discharge: None    Assessment:     Guillermina Stewart is a 82 y.o. female admitted with a medical diagnosis of CAP (community acquired pneumonia).  She presents with the following impairments/functional limitations: impaired endurance, impaired functional mobility, gait instability, impaired balance, decreased safety awareness . Pt is pleasantly confused, but daughters are present to provide history and PLOF. Mild gait instability and endurance appears below reported baseline, however PT anticipates safe d/c home with daughters/son to provide 24 hr care.    Rehab Prognosis: Good; patient would benefit from acute skilled PT services to address these deficits and reach maximum level of function.    Recent Surgery: * No surgery found *      Plan:     During this hospitalization, patient to be seen 5 x/week to address the identified rehab impairments via gait training, therapeutic activities, therapeutic exercises, neuromuscular re-education and progress toward the following goals:    Plan of Care Expires:  06/23/23    Subjective     Chief Complaint: no complaints  Patient/Family Comments/goals: go home  Pain/Comfort:  Pain Rating 1: 0/10    Patients cultural, spiritual, Tenriism conflicts given the current situation: no    Living Environment:  Pt lives with her son in [facility] with no ADONIS. Son works during the day but her daughters stay with her when he's not home.  Prior to admission, patients level of function was supervision for mobility, required assist for heavier ADLs around the home and driving.  Equipment used at home: none.  DME owned (not currently used): rolling walker and single point cane.  Upon discharge, patient will have assistance from children.    Objective:     Communicated with RN  prior to session.  Patient found up in chair with PureWick  upon PT entry to room.    General Precautions: Standard, fall  Orthopedic Precautions:    Braces:    Respiratory Status: Room air  Blood Pressure: 117/71      Exams:  Cognitive Exam:  Patient is oriented to Person and disoriented to place/situation  Gross Motor Coordination:  WFL  RLE Strength: unable to follow motor commands, moves BLEs equally against gravity WFL  LLE Strength: unable to follow motor commands, moves BLEs equally against gravity WFL  Skin integrity: Visible skin intact      Functional Mobility:  Bed Mobility:     Supine to Sit: minimum assistance  Transfers:     Sit to Stand:  contact guard assistance with no AD  Bed to Chair: contact guard assistance with  hand-held assist  using  Stand Pivot  Gait: 20ft with Kary/HHA; pt ambulating with flexed posture and attempting to steady self on surroundings, slow gait speed.   Balance: Static standing balance, no AD with CGA      AM-PAC 6 CLICK MOBILITY  Total Score:20       Treatment & Education:  No treatment rendered.    Patient and daughter/s provided with verbal education regarding safety precautions including use of call light and contacting RN if daughters were to leave the room since patient is up in chair.  Understanding was verbalized.     Patient left up in chair with all lines intact, call button in reach, RN notified, and daughters present.    GOALS:   Multidisciplinary Problems       Physical Therapy Goals          Problem: Physical Therapy    Goal Priority Disciplines Outcome Goal Variances Interventions   Physical Therapy Goal     PT, PT/OT Ongoing, Progressing     Description: Goals to be met by: 23     Patient will increase functional independence with mobility by performin. Supine to sit with Modified [place]  2. Bed to chair transfer with Supervision using No Assistive Device  3. Gait  x 150 feet with Supervision using No Assistive Device.                           History:     Past Medical History:   Diagnosis Date    HLD (hyperlipidemia)     HTN (hypertension)     Memory loss     Osteoporosis        Past Surgical History:   Procedure Laterality Date    hemorroidectomy      HYSTERECTOMY         Time Tracking:     PT Received On: 05/23/23  PT Start Time: 1351     PT Stop Time: 1410  PT Total Time (min): 19 min     Billable Minutes: Evaluation MOD      05/23/2023

## 2023-05-24 LAB
ANION GAP SERPL CALC-SCNC: 8 MEQ/L
BACTERIA BLD CULT: NORMAL
BACTERIA BLD CULT: NORMAL
BASOPHILS # BLD AUTO: 0.07 X10(3)/MCL
BASOPHILS NFR BLD AUTO: 0.7 %
BUN SERPL-MCNC: 20.8 MG/DL (ref 9.8–20.1)
CALCIUM SERPL-MCNC: 10.8 MG/DL (ref 8.4–10.2)
CHLORIDE SERPL-SCNC: 108 MMOL/L (ref 98–107)
CO2 SERPL-SCNC: 26 MMOL/L (ref 23–31)
CREAT SERPL-MCNC: 0.87 MG/DL (ref 0.55–1.02)
CREAT/UREA NIT SERPL: 24
EOSINOPHIL # BLD AUTO: 0.52 X10(3)/MCL (ref 0–0.9)
EOSINOPHIL NFR BLD AUTO: 5.3 %
ERYTHROCYTE [DISTWIDTH] IN BLOOD BY AUTOMATED COUNT: 13 % (ref 11.5–17)
GFR SERPLBLD CREATININE-BSD FMLA CKD-EPI: >60 MLS/MIN/1.73/M2
GLUCOSE SERPL-MCNC: 107 MG/DL (ref 82–115)
HCT VFR BLD AUTO: 37.1 % (ref 37–47)
HGB BLD-MCNC: 12.5 G/DL (ref 12–16)
IMM GRANULOCYTES # BLD AUTO: 0.16 X10(3)/MCL (ref 0–0.04)
IMM GRANULOCYTES NFR BLD AUTO: 1.6 %
LYMPHOCYTES # BLD AUTO: 2.33 X10(3)/MCL (ref 0.6–4.6)
LYMPHOCYTES NFR BLD AUTO: 23.6 %
MCH RBC QN AUTO: 33 PG (ref 27–31)
MCHC RBC AUTO-ENTMCNC: 33.7 G/DL (ref 33–36)
MCV RBC AUTO: 97.9 FL (ref 80–94)
MONOCYTES # BLD AUTO: 0.86 X10(3)/MCL (ref 0.1–1.3)
MONOCYTES NFR BLD AUTO: 8.7 %
NEUTROPHILS # BLD AUTO: 5.92 X10(3)/MCL (ref 2.1–9.2)
NEUTROPHILS NFR BLD AUTO: 60.1 %
NRBC BLD AUTO-RTO: 0 %
PLATELET # BLD AUTO: 277 X10(3)/MCL (ref 130–400)
PMV BLD AUTO: 12.3 FL (ref 7.4–10.4)
POTASSIUM SERPL-SCNC: 4.4 MMOL/L (ref 3.5–5.1)
RBC # BLD AUTO: 3.79 X10(6)/MCL (ref 4.2–5.4)
SODIUM SERPL-SCNC: 142 MMOL/L (ref 136–145)
WBC # SPEC AUTO: 9.86 X10(3)/MCL (ref 4.5–11.5)

## 2023-05-24 PROCEDURE — 25000003 PHARM REV CODE 250: Performed by: INTERNAL MEDICINE

## 2023-05-24 PROCEDURE — 97116 GAIT TRAINING THERAPY: CPT | Mod: CQ

## 2023-05-24 PROCEDURE — 80048 BASIC METABOLIC PNL TOTAL CA: CPT | Performed by: INTERNAL MEDICINE

## 2023-05-24 PROCEDURE — 63600175 PHARM REV CODE 636 W HCPCS: Performed by: INTERNAL MEDICINE

## 2023-05-24 PROCEDURE — 97530 THERAPEUTIC ACTIVITIES: CPT | Mod: CQ

## 2023-05-24 PROCEDURE — 85025 COMPLETE CBC W/AUTO DIFF WBC: CPT | Performed by: INTERNAL MEDICINE

## 2023-05-24 PROCEDURE — 11000001 HC ACUTE MED/SURG PRIVATE ROOM

## 2023-05-24 RX ADMIN — ENOXAPARIN SODIUM 40 MG: 40 INJECTION SUBCUTANEOUS at 05:05

## 2023-05-24 RX ADMIN — POLYETHYLENE GLYCOL 3350 17 G: 17 POWDER, FOR SOLUTION ORAL at 10:05

## 2023-05-24 RX ADMIN — LACTULOSE 10 G: 10 SOLUTION ORAL at 05:05

## 2023-05-24 RX ADMIN — LISINOPRIL 5 MG: 5 TABLET ORAL at 10:05

## 2023-05-24 RX ADMIN — MEMANTINE 10 MG: 5 TABLET ORAL at 09:05

## 2023-05-24 RX ADMIN — MEMANTINE 10 MG: 5 TABLET ORAL at 10:05

## 2023-05-24 RX ADMIN — ASPIRIN 81 MG: 81 TABLET, COATED ORAL at 10:05

## 2023-05-24 RX ADMIN — SENNOSIDES AND DOCUSATE SODIUM 2 TABLET: 50; 8.6 TABLET ORAL at 10:05

## 2023-05-24 RX ADMIN — LACTULOSE 10 G: 10 SOLUTION ORAL at 11:05

## 2023-05-24 RX ADMIN — PRAVASTATIN SODIUM 40 MG: 40 TABLET ORAL at 10:05

## 2023-05-24 RX ADMIN — QUETIAPINE FUMARATE 50 MG: 25 TABLET ORAL at 09:05

## 2023-05-24 RX ADMIN — METHYLNALTREXONE BROMIDE 4.4 MG: 12 INJECTION, SOLUTION SUBCUTANEOUS at 10:05

## 2023-05-24 NOTE — PLAN OF CARE
Problem: Adult Inpatient Plan of Care  Goal: Optimal Comfort and Wellbeing  Outcome: Ongoing, Progressing     Problem: Infection (Pneumonia)  Goal: Resolution of Infection Signs and Symptoms  Outcome: Ongoing, Progressing     Problem: Skin Injury Risk Increased  Goal: Skin Health and Integrity  Outcome: Ongoing, Progressing

## 2023-05-24 NOTE — PROGRESS NOTES
[facility]  Hospital Medicine Progress Note        Chief Complaint: Inpatient Follow-up for right lower lobe CAP    HPI: 82-year-old female medical history of Alzheimer dementia, hypertension hyperlipidemia presented to the ED with family reporting worsening confusion over the past 2 weeks.  History obtained from patient and her daughter at bedside.  Report her symptoms started with difficulty sleeping, increasing disorientation and confusion and lethargy at times, she was diagnosed with UTI on 5/12 and prescribed Macrobid, her symptom did not improve and today was prescribed Bactrim.  Daughter also reports she has worsening cough and complaining of pain to her right side of abdomen/chest intermittently over the past 2 weeks.  On arrival to ED she was afebrile and hemodynamically stable saturating 95% on room air.  CT head showed no acute intracranial findings.  Chest x-ray show right lower lobe atelectasis/pleural effusion.  CT abdomen and pelvis without contrast notable for right lower lobe atelectasis versus consolidation with small right-sided pleural effusion.  Labs notable for WBC 15.8, normal lactic acid, urinalysis unremarkable.  She was given 500 mL normal saline and 1 g ceftriaxone and referred to hospital medicine service for further evaluation and management.       Interval Hx:   Laying in bed, reports still have no bowel movement.  Daughter is at bedside reports that patient is barely eating a bite hand and there.  Encourage patient to continue oral intake.  Discuss if she is not eating much, the stool burden may be small  Discussed will give her Relistor today and see if that makes her go, also encouraged to get up and walk  No other complains  Case was discussed with patient nurse Red and  Oneika on the floor     Objective/physical exam:  General: In no acute distress, afebrile, elderly female, pleasant  Chest: Clear to auscultation bilaterally  Heart:  RRR, +S1, S2, no appreciable murmur  Abdomen: Soft,  nontender.  Bowel sounds positive x4  MSK: Warm, no lower extremity edema, no clubbing or cyanosis  Neurologic: Alert and awake     VITAL SIGNS: 24 HRS MIN & MAX LAST   Temp  Min: 97.5 °F (36.4 °C)  Max: 98.6 °F (37 °C) 98.3 °F (36.8 °C)   BP  Min: 101/63  Max: 133/83 101/63   Pulse  Min: 76  Max: 90  86   Resp  Min: 18  Max: 18 18   SpO2  Min: 91 %  Max: 98 % (!) 92 %       Recent Labs   Lab 05/22/23  0545 05/23/23  0501 05/24/23  0423   WBC 11.01 10.51 9.86   RBC 3.78* 3.84* 3.79*   HGB 12.0 12.4 12.5   HCT 36.8* 37.5 37.1   MCV 97.4* 97.7* 97.9*   MCH 31.7* 32.3* 33.0*   MCHC 32.6* 33.1 33.7   RDW 13.2 13.2 13.0    265 277   MPV 13.0* 12.6* 12.3*         Recent Labs   Lab 05/18/23  1336 05/19/23  0544 05/20/23  0513 05/22/23  0545 05/23/23  0501 05/24/23  0423    140   < > 138 139 142   K 4.8 4.4   < > 4.6 4.6 4.4   CO2 23 22*   < > 23 24 26   BUN 10.9 10.1   < > 16.8 18.9 20.8*   CREATININE 1.07* 0.84   < > 0.86 0.78 0.87   CALCIUM 10.5* 11.0*   < > 10.4* 10.5* 10.8*   MG  --  1.90  --   --   --   --    ALBUMIN 3.7 3.4  --   --   --   --    ALKPHOS 118 115  --   --   --   --    ALT 11 12  --   --   --   --    AST 22 24  --   --   --   --    BILITOT 0.6 0.7  --   --   --   --     < > = values in this interval not displayed.            Microbiology Results (last 7 days)       Procedure Component Value Units Date/Time    Blood Culture [[id number]]  (Normal) Collected: 05/18/23 2119    Order Status: Completed Specimen: Blood from Antecubital, Right Updated: 05/24/23 0200     CULTURE, BLOOD ([facility]) No Growth at 5 days    Blood Culture [[id number]]  (Normal) Collected: 05/18/23 2119    Order Status: Completed Specimen: Blood from Arm, Right Updated: 05/24/23 0200     CULTURE, BLOOD ([facility]) No Growth at 5 days             See below for Radiology    Scheduled Med:   aspirin  81 mg Oral Daily    enoxparin  40 mg Subcutaneous Daily    lactulose 10 gram/15 ml  10  g Oral Q6H    lisinopriL  5 mg Oral Daily    memantine  10 mg Oral BID    polyethylene glycol  17 g Oral BID    pravastatin  40 mg Oral Daily    QUEtiapine  50 mg Oral QHS    senna-docusate 8.6-50 mg  2 tablet Oral BID        Continuous Infusions:         PRN Meds:  acetaminophen, acetaminophen, aluminum-magnesium hydroxide-simethicone, benzonatate, bisacodyL, dextromethorphan-guaiFENesin  mg/5 ml, melatonin, ondansetron, sodium chloride 0.9%       Assessment/Plan:   Right lower lobe community-acquired pneumonia   Acute delirium superimposed on dementia   Possible UTI?    History of dementia   essential hypertension   Hyperlipidemia  Hypercalcemia- persists     Blood pressure is stable   Still did not had a bowel movement--> flat and erect x-ray today and that showed mild colonic stool burden   Receiving Colace, MiraLax, lactulose q 6 hours until patient has a bowel movement, dulcolax suppository, enema --> still no BM--> ordered Relistor  Calcium is slightly elevated intact PTH is normal, possibly secondary to immobilization?  Pt will have a repeat calcium level in few days outpatient with her primary care physician to continue monitoring   COVID influenza, RSV negative, respiratory PCR negative  UA was negative on admission  CT of the abdomen had shown concern about possible cystitis even though UA has been negative- pt received and completed rocephin   Completed Rocephin and azithromycin  Blood cultures have been negative  to date  Home medications have been resumed that includes lisinopril, aspirin, memantine, pravastatin and Seroquel  Physical therapy evaluated the patient, recommended home with family  Morning lab stable, repeat Friday      VTE prophylaxis:  Lovenox    Patient condition:  Stable    Anticipated discharge and Disposition:   Home with family once have a BM      All diagnosis and differential diagnosis have been reviewed; assessment and plan has been documented; I have personally reviewed  the labs and test results that are presently available; I have reviewed the patients medication list; I have reviewed the consulting providers response and recommendations. I have reviewed or attempted to review medical records based upon their availability    All of the patient's questions have been  addressed and answered. Patient's is agreeable to the above stated plan. I will continue to monitor closely and make adjustments to medical management as needed.  _____________________________________________________________________    Nutrition Status:    Radiology:  X-Ray Abdomen Flat And Erect  Narrative: EXAMINATION:  XR ABDOMEN FLAT AND ERECT    CLINICAL HISTORY:  CONSTIPATION;    TECHNIQUE:  Supine and upright views of the abdomen performed.    COMPARISON:  05/20/2023    FINDINGS:  No dilated bowel loops. No evidence of obstruction.Mild colonic stool burden.    No evidence of free intraperitoneal air.    No appreciable acute osseous abnormality.  Impression: Mild colonic stool burden.    Electronically signed by: Rosmery Cardozo  Date:    05/23/2023  Time:    12:38      Federico Pulido MD  Department of Hospital Medicine   [facility]   05/24/2023

## 2023-05-25 VITALS
OXYGEN SATURATION: 93 % | DIASTOLIC BLOOD PRESSURE: 75 MMHG | WEIGHT: 130 LBS | HEART RATE: 84 BPM | SYSTOLIC BLOOD PRESSURE: 135 MMHG | RESPIRATION RATE: 18 BRPM | BODY MASS INDEX: 20.89 KG/M2 | HEIGHT: 66 IN | TEMPERATURE: 98 F

## 2023-05-25 PROBLEM — K59.00 CONSTIPATION: Status: RESOLVED | Noted: 2023-05-25 | Resolved: 2023-05-25

## 2023-05-25 PROBLEM — J18.9 CAP (COMMUNITY ACQUIRED PNEUMONIA): Status: RESOLVED | Noted: 2023-05-18 | Resolved: 2023-05-25

## 2023-05-25 PROBLEM — R41.0 DELIRIUM: Status: RESOLVED | Noted: 2023-05-18 | Resolved: 2023-05-25

## 2023-05-25 PROBLEM — K59.00 CONSTIPATION: Status: ACTIVE | Noted: 2023-05-25

## 2023-05-25 PROCEDURE — 25000003 PHARM REV CODE 250: Performed by: INTERNAL MEDICINE

## 2023-05-25 PROCEDURE — 97110 THERAPEUTIC EXERCISES: CPT | Mod: CQ

## 2023-05-25 PROCEDURE — 97116 GAIT TRAINING THERAPY: CPT | Mod: CQ

## 2023-05-25 RX ORDER — POLYETHYLENE GLYCOL 3350 17 G/17G
17 POWDER, FOR SOLUTION ORAL DAILY
Refills: 0 | COMMUNITY
Start: 2023-05-25

## 2023-05-25 RX ORDER — QUETIAPINE FUMARATE 50 MG/1
50 TABLET, FILM COATED ORAL NIGHTLY
Qty: 30 TABLET | Refills: 1 | Status: SHIPPED | OUTPATIENT
Start: 2023-05-25 | End: 2023-06-22

## 2023-05-25 RX ORDER — LISINOPRIL 5 MG/1
5 TABLET ORAL DAILY
Qty: 90 TABLET | Refills: 0 | Status: SHIPPED | OUTPATIENT
Start: 2023-05-25 | End: 2023-06-22 | Stop reason: SDUPTHER

## 2023-05-25 RX ADMIN — LISINOPRIL 5 MG: 5 TABLET ORAL at 08:05

## 2023-05-25 RX ADMIN — ASPIRIN 81 MG: 81 TABLET, COATED ORAL at 08:05

## 2023-05-25 RX ADMIN — MEMANTINE 10 MG: 5 TABLET ORAL at 08:05

## 2023-05-25 RX ADMIN — SENNOSIDES AND DOCUSATE SODIUM 2 TABLET: 50; 8.6 TABLET ORAL at 08:05

## 2023-05-25 RX ADMIN — PRAVASTATIN SODIUM 40 MG: 40 TABLET ORAL at 08:05

## 2023-05-25 NOTE — DISCHARGE SUMMARY
[facility] Hospital Medicine Discharge Summary    Admit Date: 5/18/2023  Discharge Date and Time: 5/25/20238:59 AM  Admitting Physician:  Team  Discharging Physician: Federico Pulido MD.  Primary Care Physician: Cruz Almendarez MD  Consults: None    Discharge Diagnoses:  Right lower lobe community-acquired pneumonia - treated  Acute delirium superimposed on dementia - resolved \  History of dementia   Essential hypertension   Hyperlipidemia  Hypercalcemia- persists   Constipation- resolved     Hospital Course:   82-year-old female medical history of Alzheimer dementia, hypertension hyperlipidemia presented to the ED with family reporting worsening confusion over the past 2 weeks.  History obtained from patient and her daughter at bedside.  Report her symptoms started with difficulty sleeping, increasing disorientation and confusion and lethargy at times, she was diagnosed with UTI on 5/12 and prescribed Macrobid, her symptom did not improve and today was prescribed Bactrim.  Daughter also reports she has worsening cough and complaining of pain to her right side of abdomen/chest intermittently over the past 2 weeks.  On arrival to ED she was afebrile and hemodynamically stable saturating 95% on room air.  CT head showed no acute intracranial findings.  Chest x-ray show right lower lobe atelectasis/pleural effusion.  CT abdomen and pelvis without contrast notable for right lower lobe atelectasis versus consolidation with small right-sided pleural effusion.  Labs notable for WBC 15.8, normal lactic acid, urinalysis unremarkable.  She was given 500 mL normal saline and 1 g ceftriaxone and referred to hospital medicine service for further evaluation and management.   Blood pressure is stable   Still did not had a bowel movement--> flat and erect x-ray today and that showed mild colonic stool burden   Receiving Colace, MiraLax, lactulose q 6 hours until patient has a bowel movement,  dulcolax suppository, enema --> still no BM--> ordered Relistor--> constipation resolved   Calcium is slightly elevated intact PTH is normal, possibly secondary to immobilization?  Pt will have a repeat calcium level in few days outpatient with her primary care physician to continue monitoring   COVID influenza, RSV negative, respiratory PCR negative  UA was negative on admission  CT of the abdomen had shown concern about possible cystitis even though UA has been negative- pt received and completed rocephin   Completed Rocephin and azithromycin  Blood cultures have been negative  to date  Home medications have been resumed that includes lisinopril, aspirin, memantine, pravastatin and Seroquel  Physical therapy evaluated the patient, recommended home with family    Pt was seen and examined on the day of discharge  Vitals:  VITAL SIGNS: 24 HRS MIN & MAX LAST   Temp  Min: 97.6 °F (36.4 °C)  Max: 98.9 °F (37.2 °C) 98.3 °F (36.8 °C)   BP  Min: 90/60  Max: 148/86 135/75   Pulse  Min: 78  Max: 90  84   Resp  Min: 18  Max: 18 18   SpO2  Min: 92 %  Max: 97 % (!) 93 %       Physical Exam:  General: In no acute distress, afebrile, elderly female, pleasant  Chest: Clear to auscultation bilaterally  Heart: RRR, +S1, S2, no appreciable murmur  Abdomen: Soft,  nontender.  Bowel sounds positive x4  MSK: Warm, no lower extremity edema, no clubbing or cyanosis  Neurologic: Alert and awake     Procedures Performed: No admission procedures for hospital encounter.     Significant Diagnostic Studies: See Full reports for all details    Recent Labs   Lab 05/22/23  0545 05/23/23  0501 05/24/23  0423   WBC 11.01 10.51 9.86   RBC 3.78* 3.84* 3.79*   HGB 12.0 12.4 12.5   HCT 36.8* 37.5 37.1   MCV 97.4* 97.7* 97.9*   MCH 31.7* 32.3* 33.0*   MCHC 32.6* 33.1 33.7   RDW 13.2 13.2 13.0    265 277   MPV 13.0* 12.6* 12.3*       Recent Labs   Lab 05/18/23  1336 05/19/23  0544 05/20/23  0513 05/22/23  0545 05/23/23  0501 05/24/23  0423     140   < > 138 139 142   K 4.8 4.4   < > 4.6 4.6 4.4   CO2 23 22*   < > 23 24 26   BUN 10.9 10.1   < > 16.8 18.9 20.8*   CREATININE 1.07* 0.84   < > 0.86 0.78 0.87   CALCIUM 10.5* 11.0*   < > 10.4* 10.5* 10.8*   MG  --  1.90  --   --   --   --    ALBUMIN 3.7 3.4  --   --   --   --    ALKPHOS 118 115  --   --   --   --    ALT 11 12  --   --   --   --    AST 22 24  --   --   --   --    BILITOT 0.6 0.7  --   --   --   --     < > = values in this interval not displayed.        Microbiology Results (last 7 days)       Procedure Component Value Units Date/Time    Blood Culture [[id number]]  (Normal) Collected: 05/18/23 2119    Order Status: Completed Specimen: Blood from Antecubital, Right Updated: 05/24/23 0200     CULTURE, BLOOD ([facility]) No Growth at 5 days    Blood Culture [[id number]]  (Normal) Collected: 05/18/23 2119    Order Status: Completed Specimen: Blood from Arm, Right Updated: 05/24/23 0200     CULTURE, BLOOD ([facility]) No Growth at 5 days             X-Ray Abdomen Flat And Erect  Narrative: EXAMINATION:  XR ABDOMEN FLAT AND ERECT    CLINICAL HISTORY:  CONSTIPATION;    TECHNIQUE:  Supine and upright views of the abdomen performed.    COMPARISON:  05/20/2023    FINDINGS:  No dilated bowel loops. No evidence of obstruction.Mild colonic stool burden.    No evidence of free intraperitoneal air.    No appreciable acute osseous abnormality.  Impression: Mild colonic stool burden.    Electronically signed by: Rosmery Cardozo  Date:    05/23/2023  Time:    12:38         Medication List        START taking these medications      polyethylene glycol 17 gram Pwpk  Commonly known as: GLYCOLAX  Take 17 g by mouth once daily.     QUEtiapine 50 MG tablet  Commonly known as: SEROQUEL  Take 1 tablet (50 mg total) by mouth every evening.            CHANGE how you take these medications      lisinopriL 5 MG tablet  Commonly known as: PRINIVIL,ZESTRIL  Take 1 tablet (5 mg total) by mouth once daily.  What changed:   medication  strength  how much to take            CONTINUE taking these medications      aspirin 81 MG EC tablet  Commonly known as: ECOTRIN  Take 1 tablet (81 mg total) by mouth once daily.     memantine 21 mg Cspx  Commonly known as: NAMENDA XR  Take 21 mg by mouth once daily.     pravastatin 40 MG tablet  Commonly known as: PRAVACHOL  Take 1 tablet (40 mg total) by mouth once daily.     PROLIA 60 mg/mL Syrg  Generic drug: denosumab            STOP taking these medications      sulfamethoxazole-trimethoprim 800-160mg 800-160 mg Tab  Commonly known as: BACTRIM DS               Where to Get Your Medications        These medications were sent to [facility] - [address]  [address] [facility] [facility] [facility] [place] [address]      Phone: [phone]   lisinopriL 5 MG tablet  QUEtiapine 50 MG tablet       You can get these medications from any pharmacy    You don't need a prescription for these medications  polyethylene glycol 17 gram Pwpk          Explained in detail to the patient about the discharge plan, medications, and follow-up visits. Pt understands and agrees with the treatment plan  Discharge Disposition: Home or Self Care or  if family decides   Discharged Condition: stable  Diet-   Dietary Orders (From admission, onward)       Start     Ordered    05/21/23 0830  Dietary nutrition supplements Boost Vanilla; TID  Continuous        Question Answer Comment   Select PO Supplement: Boost Vanilla    Frequency: TID        05/21/23 0831    05/18/23 2050  Diet heart healthy  (Diet/Nutrition [facility])  Diet effective now         05/18/23 2049                   Medications Per DC med rec  Activities as tolerated   Follow-up Information       Cruz Almendarez MD. Schedule an appointment as soon as possible for a visit in 2 week(s).    Specialty: Internal Medicine  Why: post discharge  Contact information:  [address] [address] [phone]                           For further questions contact hospitalist  office    Discharge time 34 minutes    For worsening symptoms, chest pain, shortness of breath, increased abdominal pain, high grade fever, stroke or stroke like symptoms, immediately go to the nearest Emergency Room or call 911 as soon as possible.      Federico Pulido MD  Department of Hospital Medicine   [facility]   05/25/2023 8:59 AM

## 2023-05-25 NOTE — PLAN OF CARE
05/25/23 0911   Final Note   Assessment Type Discharge Planning Assessment   Anticipated Discharge Disposition Home   Post-Acute Status   Discharge Delays None known at this time

## 2023-05-25 NOTE — PLAN OF CARE
Patient choice list given referral sent via careport to [facility].  FOC signed. Answered questions about sitters at home

## 2023-05-25 NOTE — PT/OT/SLP PROGRESS
Physical Therapy Treatment    Patient Name:  Guillermina Stewart   MRN:  [id number]    Recommendations:     Discharge Recommendations: other (see comments) (home with family)  Discharge Equipment Recommendations: none  Barriers to discharge: None    Assessment:     Guillermina Stewart is a 82 y.o. female admitted with a medical diagnosis of CAP (community acquired pneumonia).  She presents with the following impairments/functional limitations: impaired endurance, impaired functional mobility, gait instability, impaired balance, decreased safety awareness .    Rehab Prognosis: Good; patient would benefit from acute skilled PT services to address these deficits and reach maximum level of function.    Recent Surgery: * No surgery found *      Plan:     During this hospitalization, patient to be seen 5 x/week to address the identified rehab impairments via gait training, therapeutic activities, therapeutic exercises, neuromuscular re-education and progress toward the following goals:    Plan of Care Expires:  06/23/23    Subjective     Chief Complaint:   Patient/Family Comments/goals:   Pain/Comfort:         Objective:     Communicated with NSG prior to session.  Patient found HOB elevated with   upon PT entry to room.     General Precautions: Standard, fall  Orthopedic Precautions:    Braces:    Respiratory Status: Room air  Blood Pressure:   Skin Integrity: Visible skin intact      Functional Mobility:  Bed Mobility:     Scooting: minimum assistance  Supine to Sit: minimum assistance  Sit to Supine: minimum assistance  Transfers:     Sit to Stand:  minimum assistance with hand-held assist  Gait: Upon standing initially pt demos a posterior LOB. Max A to correct. Pt confused at times and appears somewhat anxious, cues needed for attention to task. Pt amb ~160ft. Slow but mostly steady; pt reaching for rails in the sen at times. No AD; family educated that pt would benefit from a RW at this time for safety.      Patient left HOB  elevated with all lines intact, call button in reach, and bed alarm on..    GOALS:   Multidisciplinary Problems       Physical Therapy Goals          Problem: Physical Therapy    Goal Priority Disciplines Outcome Goal Variances Interventions   Physical Therapy Goal     PT, PT/OT Ongoing, Progressing     Description: Goals to be met by: 23     Patient will increase functional independence with mobility by performin. Supine to sit with Modified [place]  2. Bed to chair transfer with Supervision using No Assistive Device  3. Gait  x 150 feet with Supervision using No Assistive Device.                          Time Tracking:     PT Received On:    PT Start Time: 1430     PT Stop Time: 1454  PT Total Time (min): 24 min     Billable Minutes: Gait Training 12 and Therapeutic Activity 12    Treatment Type: Treatment  PT/PTA: PTA     Number of PTA visits since last PT visit: 2023

## 2023-05-25 NOTE — PLAN OF CARE
Joslyn faxed completed [employer] [employer] Medical Necessity Form for home health and received successful confirmation page.

## 2023-05-25 NOTE — PLAN OF CARE
05/25/23 0911   Medicare Message   Important Message from Medicare regarding Discharge Appeal Rights Explained to patient/caregiver

## 2023-05-26 ENCOUNTER — PATIENT OUTREACH (OUTPATIENT)
Dept: ADMINISTRATIVE | Facility: CLINIC | Age: 83
End: 2023-05-26
Payer: MEDICARE

## 2023-05-30 ENCOUNTER — TELEPHONE (OUTPATIENT)
Dept: INTERNAL MEDICINE | Facility: CLINIC | Age: 83
End: 2023-05-30
Payer: MEDICARE

## 2023-05-30 DIAGNOSIS — F02.B11 MODERATE EARLY ONSET ALZHEIMER'S DEMENTIA WITH AGITATION: Primary | ICD-10-CM

## 2023-05-30 DIAGNOSIS — G30.0 MODERATE EARLY ONSET ALZHEIMER'S DEMENTIA WITH AGITATION: Primary | ICD-10-CM

## 2023-05-30 NOTE — TELEPHONE ENCOUNTER
Received all from Antonio at [facility] . She states she received a request from [facility] on  5/25/23  For HH for this patient. She states she has left multiple messages for Dr. Almendarez requesting an order for HH. States initial request was made by hospitalist . Please advise if Home health is warranted and appropriate order placed with staff signature. Pt was seen over the Weekend but now is in jeopardy of service cessation. Thank you

## 2023-05-31 DIAGNOSIS — M81.0 OSTEOPOROSIS, UNSPECIFIED OSTEOPOROSIS TYPE, UNSPECIFIED PATHOLOGICAL FRACTURE PRESENCE: Primary | ICD-10-CM

## 2023-05-31 NOTE — TELEPHONE ENCOUNTER
I just placed the home health order. I'm not sure what Antonio at [facility] is referring to regarding multiple messages left to me since this is the first time I'm hearing of this. Hopefully everything is good with the order, but please let me know if I need to change anything.

## 2023-05-31 NOTE — TELEPHONE ENCOUNTER
Can you please place a new referral for reference number [id number] for skilled nursing    Dx: for skilled nursing to evaluate and treat for Alzheimer's dementia with agitation and specify if PT,OT ,ST or medication management is needed in treatment plan

## 2023-06-07 RX ORDER — DENOSUMAB 60 MG/ML
INJECTION SUBCUTANEOUS
Qty: 1 EACH | Refills: 1 | Status: SHIPPED | OUTPATIENT
Start: 2023-06-07 | End: 2023-06-07

## 2023-06-07 RX ORDER — DENOSUMAB 60 MG/ML
INJECTION SUBCUTANEOUS
Qty: 1 EACH | Refills: 1 | Status: SHIPPED | OUTPATIENT
Start: 2023-06-07 | End: 2023-06-22 | Stop reason: SDUPTHER

## 2023-06-19 DIAGNOSIS — F03.90 DEMENTIA, UNSPECIFIED DEMENTIA SEVERITY, UNSPECIFIED DEMENTIA TYPE, UNSPECIFIED WHETHER BEHAVIORAL, PSYCHOTIC, OR MOOD DISTURBANCE OR ANXIETY: ICD-10-CM

## 2023-06-19 DIAGNOSIS — R68.89 FORGETFULNESS: ICD-10-CM

## 2023-06-19 RX ORDER — MEMANTINE HYDROCHLORIDE 21 MG/1
21 CAPSULE, EXTENDED RELEASE ORAL DAILY
Qty: 30 EACH | Refills: 5 | Status: CANCELLED | OUTPATIENT
Start: 2023-06-19

## 2023-06-21 DIAGNOSIS — F03.90 DEMENTIA, UNSPECIFIED DEMENTIA SEVERITY, UNSPECIFIED DEMENTIA TYPE, UNSPECIFIED WHETHER BEHAVIORAL, PSYCHOTIC, OR MOOD DISTURBANCE OR ANXIETY: ICD-10-CM

## 2023-06-21 DIAGNOSIS — R68.89 FORGETFULNESS: ICD-10-CM

## 2023-06-21 RX ORDER — MEMANTINE HYDROCHLORIDE 21 MG/1
21 CAPSULE, EXTENDED RELEASE ORAL DAILY
Qty: 30 EACH | Refills: 5 | Status: SHIPPED | OUTPATIENT
Start: 2023-06-21 | End: 2023-06-22 | Stop reason: SDUPTHER

## 2023-06-21 NOTE — TELEPHONE ENCOUNTER
----- Message from Crystal Mai sent at 6/20/2023  1:32 PM CDT -----  Type:  RX Refill Request    Who Called:  pt daughter     RX Name and Strength:  memantine (NAMENDA XR) 21 mg CSpX        How is the patient currently taking it? (ex. 1XDay):  1 x a day     Is this a 30 day or 90 day RX: 30    Preferred Pharmacy with phone number: [facility]   Phone:  [phone]  Fax:  [phone]          Local or Mail Order:  local     Ordering Provider:  DR Marinelli     Would the patient rather a call back or a response via [facility]?  Call     Best Call Back Number: [phone]    Additional Information:  refill

## 2023-06-22 ENCOUNTER — OFFICE VISIT (OUTPATIENT)
Dept: INTERNAL MEDICINE | Facility: CLINIC | Age: 83
End: 2023-06-22
Payer: MEDICARE

## 2023-06-22 VITALS
BODY MASS INDEX: 20.7 KG/M2 | RESPIRATION RATE: 18 BRPM | SYSTOLIC BLOOD PRESSURE: 132 MMHG | TEMPERATURE: 98 F | WEIGHT: 128.81 LBS | HEIGHT: 66 IN | OXYGEN SATURATION: 96 % | DIASTOLIC BLOOD PRESSURE: 74 MMHG | HEART RATE: 74 BPM

## 2023-06-22 DIAGNOSIS — I10 HYPERTENSION, UNSPECIFIED TYPE: ICD-10-CM

## 2023-06-22 DIAGNOSIS — I71.40 ABDOMINAL AORTIC ANEURYSM (AAA) WITHOUT RUPTURE, UNSPECIFIED PART: ICD-10-CM

## 2023-06-22 DIAGNOSIS — E78.5 HYPERLIPIDEMIA, UNSPECIFIED HYPERLIPIDEMIA TYPE: ICD-10-CM

## 2023-06-22 DIAGNOSIS — N39.0 RECURRENT UTI: ICD-10-CM

## 2023-06-22 DIAGNOSIS — E21.3 HYPERPARATHYROIDISM: ICD-10-CM

## 2023-06-22 DIAGNOSIS — E83.52 HYPERCALCEMIA: ICD-10-CM

## 2023-06-22 DIAGNOSIS — F03.90 DEMENTIA, UNSPECIFIED DEMENTIA SEVERITY, UNSPECIFIED DEMENTIA TYPE, UNSPECIFIED WHETHER BEHAVIORAL, PSYCHOTIC, OR MOOD DISTURBANCE OR ANXIETY: Primary | ICD-10-CM

## 2023-06-22 DIAGNOSIS — R68.89 FORGETFULNESS: ICD-10-CM

## 2023-06-22 DIAGNOSIS — M81.0 OSTEOPOROSIS, UNSPECIFIED OSTEOPOROSIS TYPE, UNSPECIFIED PATHOLOGICAL FRACTURE PRESENCE: ICD-10-CM

## 2023-06-22 LAB — CALCIUM UR-MCNC: 36.9 MG/DL

## 2023-06-22 PROCEDURE — 82340 ASSAY OF CALCIUM IN URINE: CPT

## 2023-06-22 PROCEDURE — 99213 OFFICE O/P EST LOW 20 MIN: CPT | Mod: PBBFAC

## 2023-06-22 RX ORDER — PRAVASTATIN SODIUM 40 MG/1
40 TABLET ORAL DAILY
Qty: 90 TABLET | Refills: 1 | Status: SHIPPED | OUTPATIENT
Start: 2023-06-22 | End: 2024-02-15 | Stop reason: SDUPTHER

## 2023-06-22 RX ORDER — LISINOPRIL 5 MG/1
5 TABLET ORAL DAILY
Qty: 90 TABLET | Refills: 0 | Status: SHIPPED | OUTPATIENT
Start: 2023-06-22 | End: 2023-10-18

## 2023-06-22 RX ORDER — MEMANTINE HYDROCHLORIDE 21 MG/1
21 CAPSULE, EXTENDED RELEASE ORAL DAILY
Qty: 90 EACH | Refills: 1 | Status: SHIPPED | OUTPATIENT
Start: 2023-06-22 | End: 2023-10-18 | Stop reason: DRUGHIGH

## 2023-06-22 RX ORDER — DENOSUMAB 60 MG/ML
INJECTION SUBCUTANEOUS
Qty: 1 EACH | Refills: 0 | Status: SHIPPED | OUTPATIENT
Start: 2023-06-22

## 2023-06-22 RX ORDER — ASPIRIN 81 MG/1
81 TABLET ORAL DAILY
Qty: 90 TABLET | Refills: 1
Start: 2023-06-22 | End: 2024-06-21

## 2023-06-22 NOTE — PROGRESS NOTES
INTERNAL MEDICINE RESIDENT CLINIC  CLINIC NOTE    Patient Name: Guillermina Stewart  YOB: 1940    PRESENTING HISTORY       History of Present Illness:  Ms. Guillermina Stewart is a 82 y.o. female who is coming in for a follow up visit. She had a hospital stay for a UTI and also a pneumonia. She was treated with Abx and discharged and has been doing well. She lives by herself but has either her daughter or son there for the majority of the day/night (by herself for a few hours). She has trouble with memory and does not remember her childrens' names. There have not been any accidents related to poor memory such as leaving the stove/faucet on, wandering around the neighborhood or getting lost. Her mood has been OK and she has not been having anger outbursts. She walks good and hasn't had any falls.      ROS  Constitutional: no fever/chills  EENT: no sore throat, ear pain, sinus pain/congestion, nasal congestion/drainage  Respiratory: no cough, no wheezing, no shortness of breath  Cardiovascular: no chest pain, no palpitations, no edema  Gastrointestinal: no nausea, vomiting, or diarrhea. No abdominal pain  Genitourinary: no dysuria, no urinary frequency or urgency, no hematuria  Integumentary: no skin rash or abnormal lesion  Neurologic: no headache, no dizziness, no weakness or numbness      PAST HISTORY:     Past Medical History:   Diagnosis Date    HLD (hyperlipidemia)     HTN (hypertension)     Memory loss     Osteoporosis        Past Surgical History:   Procedure Laterality Date    hemorroidectomy      HYSTERECTOMY         Family History   Problem Relation Age of Onset    Anxiety disorder Father        Social History     Socioeconomic History    Marital status:     Number of children: 3   Occupational History    Occupation: retired   Tobacco Use    Smoking status: Every Day     Packs/day: 0.50     Years: 60.00     Pack years: 30.00     Types: Cigarettes    Smokeless tobacco: Never   Substance and Sexual  Activity    Alcohol use: Not Currently    Drug use: Never    Sexual activity: Not Currently     Partners: Male     Social Determinants of Health     Financial Resource Strain: Low Risk     Difficulty of Paying Living Expenses: Not hard at all   Food Insecurity: No Food Insecurity    Worried About Running Out of Food in the Last Year: Never true    Ran Out of Food in the Last Year: Never true   Transportation Needs: No Transportation Needs    Lack of Transportation (Medical): No    Lack of Transportation (Non-Medical): No   Physical Activity: Inactive    Days of Exercise per Week: 0 days    Minutes of Exercise per Session: 0 min   Stress: No Stress Concern Present    Feeling of Stress : Not at all   Social Connections: Socially Isolated    Frequency of Communication with Friends and Family: More than three times a week    Frequency of Social Gatherings with Friends and Family: More than three times a week    Attends Evangelical Services: Never    Active Member of Clubs or Organizations: No    Attends Club or Organization Meetings: Never    Marital Status:    Housing Stability: Low Risk     Unable to Pay for Housing in the Last Year: No    Number of Places Lived in the Last Year: 1    Unstable Housing in the Last Year: No       MEDICATIONS & ALLERGIES:     Current Outpatient Medications on File Prior to Visit   Medication Sig    aspirin (ECOTRIN) 81 MG EC tablet Take 1 tablet (81 mg total) by mouth once daily.    lisinopriL (PRINIVIL,ZESTRIL) 5 MG tablet Take 1 tablet (5 mg total) by mouth once daily.    memantine (NAMENDA XR) 21 mg CSpX Take 21 mg by mouth once daily.    polyethylene glycol (GLYCOLAX) 17 gram PwPk Take 17 g by mouth once daily.    pravastatin (PRAVACHOL) 40 MG tablet Take 1 tablet (40 mg total) by mouth once daily.    PROLIA 60 mg/mL Syrg Inject into skin of abdomen, upper arm, or upper thigh every 6 months    QUEtiapine (SEROQUEL) 50 MG tablet Take 1 tablet (50 mg total) by mouth every evening.  "(Patient not taking: Reported on 6/22/2023)     No current facility-administered medications on file prior to visit.       Review of patient's allergies indicates:   Allergen Reactions    Propoxyphene     Tramadol Nausea And Vomiting       OBJECTIVE:   Vital Signs:  Vitals:    06/22/23 1239   BP: 132/74   Pulse: 74   Resp: 18   Temp: 98.1 °F (36.7 °C)   SpO2: 96%   Weight: 58.4 kg (128 lb 12.8 oz)   Height: 5' 6" (1.676 m)       No results found for this or any previous visit (from the past 24 hour(s)).      Physical Exam    General - Appears comfortable, appropriatley conversive   Mental Status -speaking in logical, relevant sentences   HEENT - no rhinorrhea   Cardiac - RRR, no murmurs, rubs, or gallops; no edema in LE   Respiratory - breathing comfortably; clear to ascultation bilaterally   Abdominal - nondistended, soft, nontender to palpation   Extremities - LE, UE, and joints are nonerythematous and nonswollen   Skin - no rashes or bruises seen on skin          Laboratory  Lab Results   Component Value Date    WBC 9.86 05/24/2023    HGB 12.5 05/24/2023    HCT 37.1 05/24/2023    MCV 97.9 (H) 05/24/2023     05/24/2023     @[id number](GLU,NA,K,Cl,CO2,BUN,Creatinine,Calcium,MG)@  No results found for: INR, PROTIME  Lab Results   Component Value Date    HGBA1C 5.7 01/30/2023     No results for input(s): POCTGLUCOSE in the last 72 hours.        ASSESSMENT & PLAN:     Recurrent UTI  -has had at least 3 UTI's in last year  -will refer to urology    Osteoporosis  -likely secondary to smoking history, age and history of hysterectomy; now also has hyperparathyroidism  -Prolia injections started 7/2016, q6months. Continue  -increase Vit D to 2000 daily.   -DEXA 2022 showed osteoporosis and stable from 2020     Hyperparathyroidism  -has osteoporosis so meets criteria for surgery  -Endocrinology appointment scheduled for August 2023 in about 1 month from now.   -have tried to collect urine calcium and cr before but " unable. Will try again today    TIRADS 5 thyroid nodules  -has TIRADS 5 subcentimeter nodules to small to biopsy-rec is for repeat US in 1 year.  -will get repeat US in 10/2023    Dementia  Concern for Alzheimer's  -continue memantine 21mg daily  -B12, Folate, Syphilis oracio, LATOYA, RF, ESR, CRP are normal  -Assessed medical decision-making capacity and she does not have capacity. Her oldest daughter, Patricia, would be the medical decision maker. 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 - noted on CT scan 11/2022  -will get Abdominal US later this year to monitor    Health maintenance  -due for tdap, pneumovax, shingrix, flu - patient refusing vaccines  -doesn't need routine cancer screenings given age (mammogram, pap smear, low-dose CT).   -patient has >30 pack year smoking history    Other patient information  2021-lives in [place]. Her  passed away over 15 years ago. Grandson passed away. Used to arm-wrestle and likes to watch wrestling on TV. 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 She has 2 daughters and 1 son.  2022-currently lives with her son. She does drive and visit friends' houses.  2023 - Ms. Stewart no longer drives. She is doing OK, living in her house with her son and daughter keeping a close eye on her.    Follow up with Torri Ott as scheduled in December    Cruz Almendarez MD  Internal Medicine PGY-3

## 2023-06-22 NOTE — PROGRESS NOTES
I have reviewed and concur with the resident's history, physical, assessment, and plan.  I have discussed with him all issues related to the diagnosis, workup and treatment plan. Care provided as reasonable and necessary.    Marco Adamson MD  [facility]

## 2023-07-20 ENCOUNTER — OFFICE VISIT (OUTPATIENT)
Dept: UROLOGY | Facility: CLINIC | Age: 83
End: 2023-07-20
Payer: MEDICARE

## 2023-07-20 VITALS
TEMPERATURE: 98 F | DIASTOLIC BLOOD PRESSURE: 56 MMHG | SYSTOLIC BLOOD PRESSURE: 71 MMHG | WEIGHT: 128 LBS | OXYGEN SATURATION: 98 % | BODY MASS INDEX: 21.33 KG/M2 | HEIGHT: 65 IN | RESPIRATION RATE: 20 BRPM | HEART RATE: 93 BPM

## 2023-07-20 DIAGNOSIS — F17.200 NEEDS SMOKING CESSATION EDUCATION: ICD-10-CM

## 2023-07-20 DIAGNOSIS — N39.0 RECURRENT UTI: ICD-10-CM

## 2023-07-20 LAB
BILIRUB SERPL-MCNC: NORMAL MG/DL
BLOOD URINE, POC: NORMAL
COLOR, POC UA: YELLOW
GLUCOSE UR QL STRIP: NORMAL
KETONES UR QL STRIP: NORMAL
LEUKOCYTE ESTERASE URINE, POC: NORMAL
NITRITE, POC UA: NORMAL
PH, POC UA: 6.5
PROTEIN, POC: 100
SPECIFIC GRAVITY, POC UA: 1.02
UROBILINOGEN, POC UA: 1

## 2023-07-20 PROCEDURE — 3288F FALL RISK ASSESSMENT DOCD: CPT | Mod: CPTII,,, | Performed by: NURSE PRACTITIONER

## 2023-07-20 PROCEDURE — 1159F MED LIST DOCD IN RCRD: CPT | Mod: CPTII,,, | Performed by: NURSE PRACTITIONER

## 2023-07-20 PROCEDURE — 1159F PR MEDICATION LIST DOCUMENTED IN MEDICAL RECORD: ICD-10-PCS | Mod: CPTII,,, | Performed by: NURSE PRACTITIONER

## 2023-07-20 PROCEDURE — 99214 OFFICE O/P EST MOD 30 MIN: CPT | Mod: PBBFAC | Performed by: NURSE PRACTITIONER

## 2023-07-20 PROCEDURE — 81001 URINALYSIS AUTO W/SCOPE: CPT | Mod: PBBFAC | Performed by: NURSE PRACTITIONER

## 2023-07-20 PROCEDURE — 3074F SYST BP LT 130 MM HG: CPT | Mod: CPTII,,, | Performed by: NURSE PRACTITIONER

## 2023-07-20 PROCEDURE — 3074F PR MOST RECENT SYSTOLIC BLOOD PRESSURE < 130 MM HG: ICD-10-PCS | Mod: CPTII,,, | Performed by: NURSE PRACTITIONER

## 2023-07-20 PROCEDURE — 3078F PR MOST RECENT DIASTOLIC BLOOD PRESSURE < 80 MM HG: ICD-10-PCS | Mod: CPTII,,, | Performed by: NURSE PRACTITIONER

## 2023-07-20 PROCEDURE — 87077 CULTURE AEROBIC IDENTIFY: CPT | Performed by: NURSE PRACTITIONER

## 2023-07-20 PROCEDURE — 1126F PR PAIN SEVERITY QUANTIFIED, NO PAIN PRESENT: ICD-10-PCS | Mod: CPTII,,, | Performed by: NURSE PRACTITIONER

## 2023-07-20 PROCEDURE — 99213 OFFICE O/P EST LOW 20 MIN: CPT | Mod: S$PBB,,, | Performed by: NURSE PRACTITIONER

## 2023-07-20 PROCEDURE — 87088 URINE BACTERIA CULTURE: CPT | Performed by: NURSE PRACTITIONER

## 2023-07-20 PROCEDURE — 1160F PR REVIEW ALL MEDS BY PRESCRIBER/CLIN PHARMACIST DOCUMENTED: ICD-10-PCS | Mod: CPTII,,, | Performed by: NURSE PRACTITIONER

## 2023-07-20 PROCEDURE — 99213 PR OFFICE/OUTPT VISIT, EST, LEVL III, 20-29 MIN: ICD-10-PCS | Mod: S$PBB,,, | Performed by: NURSE PRACTITIONER

## 2023-07-20 PROCEDURE — 1126F AMNT PAIN NOTED NONE PRSNT: CPT | Mod: CPTII,,, | Performed by: NURSE PRACTITIONER

## 2023-07-20 PROCEDURE — 3078F DIAST BP <80 MM HG: CPT | Mod: CPTII,,, | Performed by: NURSE PRACTITIONER

## 2023-07-20 PROCEDURE — 3288F PR FALLS RISK ASSESSMENT DOCUMENTED: ICD-10-PCS | Mod: CPTII,,, | Performed by: NURSE PRACTITIONER

## 2023-07-20 PROCEDURE — 1160F RVW MEDS BY RX/DR IN RCRD: CPT | Mod: CPTII,,, | Performed by: NURSE PRACTITIONER

## 2023-07-20 PROCEDURE — 1101F PR PT FALLS ASSESS DOC 0-1 FALLS W/OUT INJ PAST YR: ICD-10-PCS | Mod: CPTII,,, | Performed by: NURSE PRACTITIONER

## 2023-07-20 PROCEDURE — 1101F PT FALLS ASSESS-DOCD LE1/YR: CPT | Mod: CPTII,,, | Performed by: NURSE PRACTITIONER

## 2023-07-20 NOTE — PROGRESS NOTES
Chief Complaint:   Chief Complaint   Patient presents with    MARCO A'S       HPI:  Patient is a 82-year-old female referred to Urology for recurrent UTI.  Patient seen by PCP on 06/22/2023 for recurrent UTIs total of 3 in the past year.  Patient recently treated in the hospital with antibiotic therapy for UTI coupled with pneumonia, daughter unsure of antibiotic treated with. Today patient presents without dysuria, urinary urgency, frequency, incontinence, retention, gross hematuria, nocturia. Patient denies family history of urologic pathology.     Allergies:  Review of patient's allergies indicates:   Allergen Reactions    Propoxyphene     Tramadol Nausea And Vomiting       Medications:  Current Outpatient Medications   Medication Sig Dispense Refill    aspirin (ECOTRIN) 81 MG EC tablet Take 1 tablet (81 mg total) by mouth once daily. 90 tablet 1    memantine (NAMENDA XR) 21 mg CSpX Take 21 mg by mouth once daily. 90 each 1    polyethylene glycol (GLYCOLAX) 17 gram PwPk Take 17 g by mouth once daily.  0    pravastatin (PRAVACHOL) 40 MG tablet Take 1 tablet (40 mg total) by mouth once daily. 90 tablet 1    PROLIA 60 mg/mL Syrg Inject into skin of abdomen, upper arm, or upper thigh every 6 months 1 each 0    lisinopriL (PRINIVIL,ZESTRIL) 5 MG tablet Take 1 tablet (5 mg total) by mouth once daily. (Patient not taking: Reported on 7/20/2023) 90 tablet 0     No current facility-administered medications for this visit.       Review of Systems:  General: No fever, chills, fatigability, or weight loss.  Skin: No rashes, itching, or changes in color or texture of skin.  Chest: Denies THOMAS, cyanosis, wheezing, cough, and sputum production.  Abdomen: Appetite fine. No weight loss. Denies diarrhea, abdominal pain, hematemesis, or blood in stool.  Musculoskeletal: No joint stiffness or swelling. Denies back pain.  : As above.  All other review of systems negative.    PMH:  Past Medical History:   Diagnosis Date    HLD  (hyperlipidemia)     HTN (hypertension)     Memory loss     Osteoporosis        PSH:  Past Surgical History:   Procedure Laterality Date    hemorroidectomy      HYSTERECTOMY         FamHx:  Family History   Problem Relation Age of Onset    Anxiety disorder Father        SocHx:  Social History     Socioeconomic History    Marital status:     Number of children: 3   Occupational History    Occupation: retired   Tobacco Use    Smoking status: Every Day     Packs/day: 0.50     Years: 60.00     Pack years: 30.00     Types: Cigarettes    Smokeless tobacco: Never   Substance and Sexual Activity    Alcohol use: Not Currently    Drug use: Never    Sexual activity: Not Currently     Partners: Male     Social Determinants of Health     Financial Resource Strain: Low Risk     Difficulty of Paying Living Expenses: Not hard at all   Food Insecurity: No Food Insecurity    Worried About Running Out of Food in the Last Year: Never true    Ran Out of Food in the Last Year: Never true   Physical Activity: Inactive    Days of Exercise per Week: 0 days    Minutes of Exercise per Session: 0 min   Stress: No Stress Concern Present    Feeling of Stress : Not at all   Social Connections: Socially Isolated    Frequency of Communication with Friends and Family: More than three times a week    Frequency of Social Gatherings with Friends and Family: More than three times a week    Attends Sikh Services: Never    Active Member of Clubs or Organizations: No    Attends Club or Organization Meetings: Never    Marital Status:    Housing Stability: Low Risk     Unable to Pay for Housing in the Last Year: No    Number of Places Lived in the Last Year: 1    Unstable Housing in the Last Year: No       Physical Exam:  Vitals:    07/20/23 1331   BP: (!) 71/56   Pulse: 93   Resp: 20   Temp: 97.9 °F (36.6 °C)     General: A&Ox3, no apparent distress, no deformities  Neck: No masses, normal thyroid  Lungs: CTA sonido, no use of accessory  muscles  Heart: RRR, no arrhythmias  Abdomen: Soft, NT, ND, no masses, no hernias, no hepatosplenomegaly  Lymphatic: Neck and groin nodes negative  Skin: The skin is warm and dry. No jaundice.  Ext: No c/c/e.        Urinalysis:  Results for orders placed or performed in visit on 07/20/23   POCT URINE DIPSTICK WITH MICROSCOPE, AUTOMATED   Result Value Ref Range    Color, UA Yellow     Spec Grav UA 1.025     pH, UA 6.5     WBC, UA small     Nitrite, UA neg     Protein,      Glucose, UA neg     Ketones, UA neg     Urobilinogen, UA 1.0     Bilirubin, POC neg     Blood, UA trace-intact    Microscopic urinalysis revealed trace RBCs, small WBCs, nitrites negative.          Impression:  1. Recurrent UTI  - Ambulatory referral/consult to Urology  - POCT URINE DIPSTICK WITH MICROSCOPE, AUTOMATED      Plan: Instructed patient will check a urine culture and sensitivity notify patient and caregiver results and determine treatment.  Instructed patient and caregiver on timed voiding every 2-3 hrs, double voiding, avoidance of bladder irritants such as alcohol, citrus foods, chocolate, caffeinated drinks, energy drinks, spicy foods, sugar, caffeine products, sodas. Instructed patient to avoid drinking fluids 1-2 hours prior to bedtime & void immediately before bedtime.

## 2023-07-22 LAB — BACTERIA UR CULT: ABNORMAL

## 2023-07-24 ENCOUNTER — TELEPHONE (OUTPATIENT)
Dept: UROLOGY | Facility: CLINIC | Age: 83
End: 2023-07-24
Payer: MEDICARE

## 2023-07-24 DIAGNOSIS — N39.0 RECURRENT UTI: Primary | ICD-10-CM

## 2023-07-24 RX ORDER — AMOXICILLIN AND CLAVULANATE POTASSIUM 875; 125 MG/1; MG/1
1 TABLET, FILM COATED ORAL EVERY 12 HOURS
Qty: 14 TABLET | Refills: 0 | Status: SHIPPED | OUTPATIENT
Start: 2023-07-24 | End: 2023-11-01

## 2023-07-24 RX ORDER — AMOXICILLIN AND CLAVULANATE POTASSIUM 875; 125 MG/1; MG/1
1 TABLET, FILM COATED ORAL EVERY 12 HOURS
Qty: 14 TABLET | Refills: 0 | Status: SHIPPED | OUTPATIENT
Start: 2023-07-24 | End: 2023-07-24

## 2023-07-24 NOTE — TELEPHONE ENCOUNTER
Spoke to patient caregiver regarding culture sensitivity positive for E coli therefore sensitive to Augmentin and I sent Augmentin 1 tab b.i.d. times 7 days.  Instructed caregiver to notify clinic if symptoms persist

## 2023-07-25 ENCOUNTER — EXTERNAL HOME HEALTH (OUTPATIENT)
Dept: HOME HEALTH SERVICES | Facility: HOSPITAL | Age: 83
End: 2023-07-25
Payer: MEDICARE

## 2023-08-04 ENCOUNTER — TELEPHONE (OUTPATIENT)
Dept: ENDOCRINOLOGY | Facility: CLINIC | Age: 83
End: 2023-08-04

## 2023-08-04 ENCOUNTER — OFFICE VISIT (OUTPATIENT)
Dept: ENDOCRINOLOGY | Facility: CLINIC | Age: 83
End: 2023-08-04
Payer: MEDICARE

## 2023-08-04 VITALS
RESPIRATION RATE: 16 BRPM | HEART RATE: 71 BPM | TEMPERATURE: 98 F | DIASTOLIC BLOOD PRESSURE: 82 MMHG | BODY MASS INDEX: 21.19 KG/M2 | HEIGHT: 65 IN | SYSTOLIC BLOOD PRESSURE: 134 MMHG | WEIGHT: 127.19 LBS

## 2023-08-04 DIAGNOSIS — M81.0 OSTEOPOROSIS, UNSPECIFIED OSTEOPOROSIS TYPE, UNSPECIFIED PATHOLOGICAL FRACTURE PRESENCE: ICD-10-CM

## 2023-08-04 DIAGNOSIS — E04.2 MULTINODULAR GOITER: ICD-10-CM

## 2023-08-04 DIAGNOSIS — E21.0 PRIMARY HYPERPARATHYROIDISM: Primary | ICD-10-CM

## 2023-08-04 PROCEDURE — 99214 OFFICE O/P EST MOD 30 MIN: CPT | Mod: PBBFAC

## 2023-08-04 NOTE — PROGRESS NOTES
"  [facility] ENDOCRINOLOGY  OUTPATIENT OFFICE VISIT NOTE    SUBJECTIVE:      Chief Complaint: Hyperparathyroidism and Osteoporosis       HPI: Guillermina Stewart is a 82 y.o. yo female w/ PMH of  has a past medical history of HLD (hyperlipidemia), HTN (hypertension), Memory loss, and Osteoporosis., who presents for  referred to endocrinology for hyperparathyroidism.  Patient reports no acute complaints. Has had one broken bone however this was "many years ago". Per chart review, patient has had persistent hypercalcemia over the last two years. Average serum calcium between 10-11. Patient with elevated parathyroid hormones as well. Reports no confusions, no falls, no traumas, no bone breaks. Patient was unsuccessful if completing 24 hour urine calcium in past. Is currently on Prolia injections for osteoporosis. Patient with no other complaints at this time.       Past Medical History:   has a past medical history of HLD (hyperlipidemia), HTN (hypertension), Memory loss, and Osteoporosis.     Past Surgical History:   has a past surgical history that includes Hysterectomy and hemorroidectomy.     Family History:  family history includes Anxiety disorder in her father.     Social History:   reports that she has been smoking cigarettes. She has a 60.0 pack-year smoking history. She has never used smokeless tobacco. She reports that she does not currently use alcohol. She reports that she does not use drugs.     Allergies:  is allergic to propoxyphene and tramadol.     Home Medications:  Current Outpatient Medications   Medication Instructions    amoxicillin-clavulanate 875-125mg (AUGMENTIN) 875-125 mg per tablet 1 tablet, Oral, Every 12 hours    aspirin (ECOTRIN) 81 mg, Oral, Daily    lisinopriL (PRINIVIL,ZESTRIL) 5 mg, Oral, Daily    memantine (NAMENDA XR) 21 mg, Oral, Daily    polyethylene glycol (GLYCOLAX) 17 g, Oral, Daily    pravastatin (PRAVACHOL) 40 mg, Oral, Daily    PROLIA 60 mg/mL Syrg Inject into skin of abdomen, upper " "arm, or upper thigh every 6 months        ROS:  Constitutional: no fever, fatigue, weakness  Eye: no vision loss, eye redness, drainage, or pain  ENMT: no sore throat, ear pain, sinus pain/congestion, nasal congestion/drainage  Respiratory: no cough, no wheezing, no shortness of breath  Cardiovascular: no chest pain, no palpitations, no edema  Gastrointestinal: no nausea, vomiting, or diarrhea. No abdominal pain  Genitourinary: no dysuria, no urinary frequency or urgency, no hematuria  Hema/Lymph: no abnormal bruising or bleeding  Endocrine: no heat or cold intolerance, no excessive thirst or excessive urination  Musculoskeletal: no muscle or joint pain, no joint swelling  Integumentary: no skin rash or abnormal lesion  Neurologic: no headache, no dizziness, no weakness or numbness       OBJECTIVE:     Vital signs:   /82   Pulse 71   Temp 97.5 °F (36.4 °C) (Oral)   Resp 16   Ht 5' 5" (1.651 m)   Wt 57.7 kg (127 lb 3.2 oz)   BMI 21.17 kg/m²      Physical Examination:  General appearance: elderly female, alert, appears stated age, cooperative and no distress  Head: Normocephalic, without obvious abnormality, atraumatic  Neck: no adenopathy, no carotid bruit, no JVD and supple, symmetrical, trachea midline, no palpable thyroid nodules, non tender to palpation  Lungs: clear to auscultation bilaterally  Heart: regular rate and rhythm, S1, S2 normal, no murmur, click, rub or gallop  Abdomen: soft, non-tender; bowel sounds normal; no masses,  no organomegaly  Extremities: extremities normal, atraumatic, no cyanosis or edema  Pulses: 2+ and symmetric  Skin: Skin color, texture, turgor normal. No rashes or lesions  Neurologic: Grossly normal, no twiching noted, negative chvostek's     Labs:  BMP:   Lab Results   Component Value Date    CHLORIDE 108 (H) 05/24/2023    CO2 26 05/24/2023    BUN 20.8 (H) 05/24/2023    CREATININE 0.87 05/24/2023    GLUCOSE 107 05/24/2023    CALCIUM 10.8 (H) 05/24/2023     CBC:   Lab " Results   Component Value Date    WBC 9.86 05/24/2023    HGB 12.5 05/24/2023    HCT 37.1 05/24/2023    MCV 97.9 (H) 05/24/2023    RDW 13.0 05/24/2023     LFTs:   Lab Results   Component Value Date    LABPROT 7.3 05/19/2023    ALBUMIN 3.4 05/19/2023    AST 24 05/19/2023    ALT 12 05/19/2023    ALKPHOS 115 05/19/2023     FLP:   Lab Results   Component Value Date    CHOL 194 01/30/2023    HDL 54 01/30/2023    .00 01/30/2023    TRIG 120 01/30/2023    TOTALCHOLEST 4 01/30/2023     DM:   Lab Results   Component Value Date    HGBA1C 5.7 01/30/2023    .9 01/30/2023    CREATININE 0.87 05/24/2023    CREATRANDUR 145.9 (H) 06/23/2022     Thyroid:   Lab Results   Component Value Date    TSH 1.336 01/30/2023    RBHYER5ZGRH 0.92 10/20/2021     Anemia:   Lab Results   Component Value Date    GGADACJS35 914 (H) 07/22/2021    FOLATE 18.6 07/22/2021               ASSESSMENT & PLAN:      Hyperparathyroidism  Osteopetrosis  - PTH elevation in past  - calcium persistently elevated >10, most reascent at 10.8  - DEXA with osteopetrosis, T score of -2.5 in 2022  - on Prolia injections  - pending 24 hr urine calcium, random urine calcium of 36.9  - will need parathyroidectomy, referral to ENT  - most likely primary hyperparathyroidism, will rule out FHH  - will check CMP, urine calcium, urine creat, serum PTH     Thyroid Nodules  - US with thyroid nodules   - TSH/T4 wnl      Return to clinic in 6 month(s).    Romain Romero, DO  [facility], HO-[facility]  08/04/2023    Orders Placed This Encounter    Comprehensive Metabolic Panel    Vitamin D    Calcium, Random Urine    Creatinine, Random Urine    PTH, Intact    Ambulatory referral/consult to ENT

## 2023-08-04 NOTE — TELEPHONE ENCOUNTER
Bloodwork is back but not the urine.  Please clarify with pt when she will be submitting a specimen.

## 2023-08-07 NOTE — TELEPHONE ENCOUNTER
Called lab spoke with Juliet, urine calcium was not done, not sure why and they don't have the specimen anymore.

## 2023-08-07 NOTE — TELEPHONE ENCOUNTER
Please work with Isidra to open an investigation with the lab.  Please have patient repeat renal panel along with random urine calcium and random urine creatinine.  This will be helpful because on the recent bloodwork her calcium went down and I'm trying to catch it up again which can happen with primary hyperparathyroidism .

## 2023-08-07 NOTE — TELEPHONE ENCOUNTER
Spoke with pt's daughter, informed about the need to repeat labs, voiced understanding she will come back this week to repeat pending labs.

## 2023-08-08 DIAGNOSIS — N39.0 RECURRENT UTI: Primary | ICD-10-CM

## 2023-08-09 NOTE — TELEPHONE ENCOUNTER
Spoke with pt's daughter informed about calcium wnl, she states pt had her last prolia shot in June, also states they want to do the surgery, pt has appointment with ENT in October.

## 2023-08-09 NOTE — TELEPHONE ENCOUNTER
Labs are all back and of note her calcium has normalized.  Please clarify w/ pt/fly when her last denosumab shot was given as this can temporarily reduce the calcium level and let me know.

## 2023-08-10 ENCOUNTER — PATIENT MESSAGE (OUTPATIENT)
Dept: UROLOGY | Facility: CLINIC | Age: 83
End: 2023-08-10
Payer: MEDICARE

## 2023-08-11 ENCOUNTER — TELEPHONE (OUTPATIENT)
Dept: UROLOGY | Facility: CLINIC | Age: 83
End: 2023-08-11
Payer: MEDICARE

## 2023-08-11 NOTE — TELEPHONE ENCOUNTER
Voice message left for patient to return call regarding urine culture asked patient to return call.

## 2023-08-14 NOTE — TELEPHONE ENCOUNTER
I question if the prolia is suppressing the lab results/suppressing hypercalcemia and hypercalciurea.  Recommend in October (the month of her ENT appt), since it will be farther out from her prolia shot, we repeat the labs to try to get more definitive results.  To this end, please notify pt and order renal panel along w/ vit d and random urine calcium and creatinine for October.

## 2023-08-17 ENCOUNTER — HOSPITAL ENCOUNTER (OUTPATIENT)
Dept: RADIOLOGY | Facility: HOSPITAL | Age: 83
Discharge: HOME | End: 2023-08-17
Attending: STUDENT IN AN ORGANIZED HEALTH CARE EDUCATION/TRAINING PROGRAM
Payer: MEDICARE

## 2023-08-17 DIAGNOSIS — I71.40 ABDOMINAL AORTIC ANEURYSM (AAA) WITHOUT RUPTURE, UNSPECIFIED PART: ICD-10-CM

## 2023-08-17 PROCEDURE — 76775 US EXAM ABDO BACK WALL LIM: CPT | Mod: TC

## 2023-08-17 NOTE — PLAN OF CARE
Problem: Adult Inpatient Plan of Care  Goal: Plan of Care Review  Outcome: Ongoing, Progressing  Goal: Patient-Specific Goal (Individualized)  Outcome: Ongoing, Progressing  Goal: Absence of Hospital-Acquired Illness or Injury  Outcome: Ongoing, Progressing  Goal: Optimal Comfort and Wellbeing  Outcome: Ongoing, Progressing  Goal: Readiness for Transition of Care  Outcome: Ongoing, Progressing      Yes

## 2023-08-21 ENCOUNTER — TELEPHONE (OUTPATIENT)
Dept: ADMINISTRATIVE | Facility: HOSPITAL | Age: 83
End: 2023-08-21
Payer: MEDICARE

## 2023-08-21 NOTE — TELEPHONE ENCOUNTER
GOOD MORNING DR. STEVENS,     [facility] CALLED FOR A REFILL ON SEROQUEL 50 MG, ORIGINALLY PRESCRIBED IN [facility] ED BY DR JONO DUGAN.  SHE WAS GIVEN A LAST REFILL ON JUNE 17, 2023 BY DR. DUGAN  AND WAS ADVISED TO GET REFILLS FROM PCP.  LOOKING BACK, I CANNOT TELL IF SHE IS YOUR PT BECAUSE I DO SEE THAT SHE HAD AN APPT TO GET ESTABLISHED WITH [facility] ON 3/30/2023.  NOW, HER LAST OFFICE VISIT WAS WITH YOU 06/22/2023.       SHEOULD WE SEND THIS TO DR. VASQUEZ?  HE HAS NOT SEEN THIS PT.      PLEASE ADVISE.

## 2023-08-22 NOTE — TELEPHONE ENCOUNTER
Thanks for the message. I called the patient's daughter, and they haven't been giving her the Seroquel. Also, I don't think she needs that medication, so I won't refill it. You don't need to send it to Dr. Land.

## 2023-09-02 ENCOUNTER — HOSPITAL ENCOUNTER (EMERGENCY)
Facility: HOSPITAL | Age: 83
Discharge: HOME | End: 2023-09-02
Attending: EMERGENCY MEDICINE
Payer: MEDICARE

## 2023-09-02 VITALS
WEIGHT: 128 LBS | SYSTOLIC BLOOD PRESSURE: 171 MMHG | OXYGEN SATURATION: 98 % | DIASTOLIC BLOOD PRESSURE: 81 MMHG | RESPIRATION RATE: 17 BRPM | BODY MASS INDEX: 20.09 KG/M2 | HEIGHT: 67 IN | TEMPERATURE: 98 F | HEART RATE: 64 BPM

## 2023-09-02 DIAGNOSIS — R55 NEAR SYNCOPE: Primary | ICD-10-CM

## 2023-09-02 DIAGNOSIS — R42 DIZZINESS: ICD-10-CM

## 2023-09-02 DIAGNOSIS — R03.0 ELEVATED BLOOD PRESSURE READING: ICD-10-CM

## 2023-09-02 LAB
ALBUMIN SERPL-MCNC: 3.8 G/DL (ref 3.4–4.8)
ALBUMIN/GLOB SERPL: 1.2 RATIO (ref 1.1–2)
ALP SERPL-CCNC: 67 UNIT/L (ref 40–150)
ALT SERPL-CCNC: 15 UNIT/L (ref 0–55)
APPEARANCE UR: CLEAR
AST SERPL-CCNC: 21 UNIT/L (ref 5–34)
BACTERIA #/AREA URNS AUTO: NORMAL /HPF
BASOPHILS # BLD AUTO: 0.04 X10(3)/MCL
BASOPHILS NFR BLD AUTO: 0.5 %
BILIRUB SERPL-MCNC: 0.6 MG/DL
BILIRUB UR QL STRIP.AUTO: NEGATIVE
BUN SERPL-MCNC: 15.5 MG/DL (ref 9.8–20.1)
CALCIUM SERPL-MCNC: 9 MG/DL (ref 8.4–10.2)
CHLORIDE SERPL-SCNC: 108 MMOL/L (ref 98–107)
CO2 SERPL-SCNC: 21 MMOL/L (ref 23–31)
COLOR UR: YELLOW
CREAT SERPL-MCNC: 0.67 MG/DL (ref 0.55–1.02)
EOSINOPHIL # BLD AUTO: 0.13 X10(3)/MCL (ref 0–0.9)
EOSINOPHIL NFR BLD AUTO: 1.6 %
ERYTHROCYTE [DISTWIDTH] IN BLOOD BY AUTOMATED COUNT: 13.8 % (ref 11.5–17)
GFR SERPLBLD CREATININE-BSD FMLA CKD-EPI: >60 MLS/MIN/1.73/M2
GLOBULIN SER-MCNC: 3.3 GM/DL (ref 2.4–3.5)
GLUCOSE SERPL-MCNC: 125 MG/DL (ref 82–115)
GLUCOSE UR QL STRIP.AUTO: NEGATIVE
HCT VFR BLD AUTO: 43.7 % (ref 37–47)
HGB BLD-MCNC: 14.7 G/DL (ref 12–16)
IMM GRANULOCYTES # BLD AUTO: 0.04 X10(3)/MCL (ref 0–0.04)
IMM GRANULOCYTES NFR BLD AUTO: 0.5 %
KETONES UR QL STRIP.AUTO: NEGATIVE
LEUKOCYTE ESTERASE UR QL STRIP.AUTO: ABNORMAL
LYMPHOCYTES # BLD AUTO: 2.32 X10(3)/MCL (ref 0.6–4.6)
LYMPHOCYTES NFR BLD AUTO: 28.3 %
MAGNESIUM SERPL-MCNC: 1.7 MG/DL (ref 1.6–2.6)
MCH RBC QN AUTO: 32 PG (ref 27–31)
MCHC RBC AUTO-ENTMCNC: 33.6 G/DL (ref 33–36)
MCV RBC AUTO: 95 FL (ref 80–94)
MONOCYTES # BLD AUTO: 0.62 X10(3)/MCL (ref 0.1–1.3)
MONOCYTES NFR BLD AUTO: 7.6 %
NEUTROPHILS # BLD AUTO: 5.04 X10(3)/MCL (ref 2.1–9.2)
NEUTROPHILS NFR BLD AUTO: 61.5 %
NITRITE UR QL STRIP.AUTO: NEGATIVE
NRBC BLD AUTO-RTO: 0 %
PH UR STRIP.AUTO: 5.5 [PH]
PLATELET # BLD AUTO: 218 X10(3)/MCL (ref 130–400)
PMV BLD AUTO: 12 FL (ref 7.4–10.4)
POTASSIUM SERPL-SCNC: 3.6 MMOL/L (ref 3.5–5.1)
PROT SERPL-MCNC: 7.1 GM/DL (ref 5.8–7.6)
PROT UR QL STRIP.AUTO: NEGATIVE
RBC # BLD AUTO: 4.6 X10(6)/MCL (ref 4.2–5.4)
RBC #/AREA URNS AUTO: <5 /HPF
RBC UR QL AUTO: NEGATIVE
SODIUM SERPL-SCNC: 140 MMOL/L (ref 136–145)
SP GR UR STRIP.AUTO: 1.02 (ref 1–1.03)
SQUAMOUS #/AREA URNS AUTO: <5 /HPF
TROPONIN I SERPL-MCNC: <0.01 NG/ML (ref 0–0.04)
UROBILINOGEN UR STRIP-ACNC: 0.2
WBC # SPEC AUTO: 8.19 X10(3)/MCL (ref 4.5–11.5)
WBC #/AREA URNS AUTO: <5 /HPF

## 2023-09-02 PROCEDURE — 93010 EKG 12-LEAD: ICD-10-PCS | Mod: ,,, | Performed by: INTERNAL MEDICINE

## 2023-09-02 PROCEDURE — 81001 URINALYSIS AUTO W/SCOPE: CPT | Performed by: NURSE PRACTITIONER

## 2023-09-02 PROCEDURE — 84484 ASSAY OF TROPONIN QUANT: CPT | Performed by: NURSE PRACTITIONER

## 2023-09-02 PROCEDURE — 85025 COMPLETE CBC W/AUTO DIFF WBC: CPT | Performed by: NURSE PRACTITIONER

## 2023-09-02 PROCEDURE — 99284 EMERGENCY DEPT VISIT MOD MDM: CPT

## 2023-09-02 PROCEDURE — 80053 COMPREHEN METABOLIC PANEL: CPT | Performed by: NURSE PRACTITIONER

## 2023-09-02 PROCEDURE — 93005 ELECTROCARDIOGRAM TRACING: CPT

## 2023-09-02 PROCEDURE — 25000003 PHARM REV CODE 250: Performed by: EMERGENCY MEDICINE

## 2023-09-02 PROCEDURE — 83735 ASSAY OF MAGNESIUM: CPT | Performed by: NURSE PRACTITIONER

## 2023-09-02 PROCEDURE — 93010 ELECTROCARDIOGRAM REPORT: CPT | Mod: ,,, | Performed by: INTERNAL MEDICINE

## 2023-09-02 RX ORDER — CLONIDINE HYDROCHLORIDE 0.1 MG/1
0.1 TABLET ORAL
Status: COMPLETED | OUTPATIENT
Start: 2023-09-02 | End: 2023-09-02

## 2023-09-02 RX ADMIN — CLONIDINE HYDROCHLORIDE 0.1 MG: 0.1 TABLET ORAL at 11:09

## 2023-09-02 NOTE — FIRST PROVIDER EVALUATION
Medical screening examination initiated.  I have conducted a focused provider triage encounter, findings are as follows:    Brief history of present illness:  Patient states dizziness this morning. Denies any chest pain, SOB, headache, or vomiting.     There were no vitals filed for this visit.    Pertinent physical exam:  awake, alert    Brief workup plan:  Labs, EKG    Preliminary workup initiated; this workup will be continued and followed by the physician or advanced practice provider that is assigned to the patient when roomed.

## 2023-09-02 NOTE — ED PROVIDER NOTES
Encounter Date: 9/2/2023       History     Chief Complaint   Patient presents with    Dizziness     Pt reports short episode of dizziness 1hr pta while laying in the bath tub. Denies nausea, headache, weakness. Reports dizziness has resolved. States hx of vertigo when dehydrated w/ UTI.     Patient is an 83-year-old female presenting with complain of near syncopal episode prior to arrival.  Patient does have history of dementia but family member present states that patient was being bathe by a family member.  When she stood to get out of the tub patient became lightheaded and dizzy.  No LOC.  Patient denies any complaints at this time.  Patient denies headache or focal motor weakness.  No difficulty speaking now or during this episode.      Review of patient's allergies indicates:   Allergen Reactions    Propoxyphene     Tramadol Nausea And Vomiting     Past Medical History:   Diagnosis Date    HLD (hyperlipidemia)     HTN (hypertension)     Memory loss     Osteoporosis      Past Surgical History:   Procedure Laterality Date    hemorroidectomy      HYSTERECTOMY       Family History   Problem Relation Age of Onset    Anxiety disorder Father      Social History     Tobacco Use    Smoking status: Every Day     Current packs/day: 1.00     Average packs/day: 1 pack/day for 60.0 years (60.0 ttl pk-yrs)     Types: Cigarettes    Smokeless tobacco: Never   Substance Use Topics    Alcohol use: Not Currently    Drug use: Never     Review of Systems   Constitutional: Negative.    HENT: Negative.     Respiratory: Negative.     Cardiovascular: Negative.    Gastrointestinal: Negative.    Genitourinary: Negative.    Musculoskeletal: Negative.    Neurological: Negative.    Psychiatric/Behavioral: Negative.         Physical Exam     Initial Vitals [09/02/23 1017]   BP Pulse Resp Temp SpO2   138/66 88 20 98.2 °F (36.8 °C) 96 %      MAP       --         Physical Exam    Nursing note and vitals reviewed.  Constitutional: She appears  well-developed and well-nourished.   HENT:   Head: Normocephalic and atraumatic.   Neck: Neck supple.   Normal range of motion.  Cardiovascular:  Normal rate, regular rhythm and normal heart sounds.           Pulmonary/Chest: Breath sounds normal. No respiratory distress. She has no rhonchi.   Abdominal: Abdomen is soft. There is no abdominal tenderness. There is no guarding.   Musculoskeletal:         General: Normal range of motion.      Cervical back: Normal range of motion and neck supple.     Neurological: She is alert and oriented to person, place, and time. She has normal strength.   Patient can sit up right with eyes closed.  There is no ataxia.  No aphasia.  Patient has 5/5 motor strength bilateral upper and lower extremities.   Skin: Skin is warm.   Psychiatric: She has a normal mood and affect.         ED Course   Procedures  Labs Reviewed   COMPREHENSIVE METABOLIC PANEL - Abnormal; Notable for the following components:       Result Value    Chloride 108 (*)     Carbon Dioxide 21 (*)     Glucose Level 125 (*)     All other components within normal limits   URINALYSIS, REFLEX TO URINE CULTURE - Abnormal; Notable for the following components:    Leukocyte Esterase, UA 2+ (*)     All other components within normal limits   CBC WITH DIFFERENTIAL - Abnormal; Notable for the following components:    MCV 95.0 (*)     MCH 32.0 (*)     MPV 12.0 (*)     All other components within normal limits   TROPONIN I - Normal   MAGNESIUM - Normal   URINALYSIS, MICROSCOPIC - Normal   CBC W/ AUTO DIFFERENTIAL    Narrative:     The following orders were created for panel order CBC Auto Differential.  Procedure                               Abnormality         Status                     ---------                               -----------         ------                     CBC with Differential[[id number]]        Abnormal            Final result                 Please view results for these tests on the individual orders.     EKG  Readings: (Independently Interpreted)   Initial Reading: No STEMI. Rhythm: Normal Sinus Rhythm. Heart Rate: 69. Ectopy: No Ectopy. Conduction: Normal. ST Segments: Normal ST Segments. T Waves: Normal. Axis: Normal.     ECG Results              EKG 12-lead (Final result)  Result time 09/02/23 15:18:30      Final result by Interface, Lab In [facility] (09/02/23 15:18:30)                   Narrative:    Test Reason : R42,    Vent. Rate : 069 BPM     Atrial Rate : 069 BPM     P-R Int : 150 ms          QRS Dur : 070 ms      QT Int : 448 ms       P-R-T Axes : 060 -11 068 degrees     QTc Int : 480 ms    Poor data quality  Normal sinus rhythm  Possible Inferior infarct ,age undetermined  Prolonged QT interval  Abnormal ECG  When compared with ECG of 12-MAY-2023 13:15,  Minimal criteria for Anterior infarct are no longer Present  No significant change was found  Confirmed by Owen Lofton MD, Blair LI ([id number]) on 9/2/2023 3:18:21 PM    Referred By: [id number]   SELF           Confirmed By:Blair Lofton                                  Imaging Results    None          Medications   cloNIDine tablet 0.1 mg (0.1 mg Oral Given 9/2/23 1132)     Medical Decision Making  As per HPI.  Differential diagnosis: Syncope, near syncope, arrhythmia, orthostasis    Amount and/or Complexity of Data Reviewed  Labs: ordered.     Details: Bicarb is slightly depressed, all other labs are stable  ECG/medicine tests: ordered and independent interpretation performed.     Details: Patient with near syncopal episode prior to arrival.  Patient is currently asymptomatic.  Labs are all stable other than bicarb is slightly depressed.  Recommended that patient increase her p.o. intake.  She agrees.  EKG is within normal.  Will discharge to home.    Risk  Prescription drug management.               ED Course as of 09/03/23 1900   Sat Sep 02, 2023   1130 Prior to discharge, patient's systolic blood pressure still in the 180s.  Clonidine 0.1 mg was  ordered. [KG]      ED Course User Index  [KG] Yasmani Lewis MD                    Clinical Impression:   Final diagnoses:  [R55] Near syncope (Primary)  [R03.0] Elevated blood pressure reading        ED Disposition Condition    Discharge Stable          ED Prescriptions    None       Follow-up Information       Follow up With Specialties Details Why Contact Info    Cruz Almendarez MD Internal Medicine In 2 days  [address] [facility] [address]  [phone]      [facility] - Emergency Dept Emergency Medicine  As needed, If symptoms worsen [facility] [id number] [phone]             Yasmani Lewis MD  09/02/23 1125       Yasmani Lewis MD  09/03/23 1909

## 2023-10-02 ENCOUNTER — LAB VISIT (OUTPATIENT)
Dept: LAB | Facility: HOSPITAL | Age: 83
End: 2023-10-02
Attending: STUDENT IN AN ORGANIZED HEALTH CARE EDUCATION/TRAINING PROGRAM
Payer: MEDICARE

## 2023-10-02 DIAGNOSIS — M81.0 OSTEOPOROSIS, UNSPECIFIED OSTEOPOROSIS TYPE, UNSPECIFIED PATHOLOGICAL FRACTURE PRESENCE: ICD-10-CM

## 2023-10-02 DIAGNOSIS — E21.3 HYPERPARATHYROIDISM: ICD-10-CM

## 2023-10-02 LAB
ANION GAP SERPL CALC-SCNC: 8 MEQ/L
BUN SERPL-MCNC: 15.7 MG/DL (ref 9.8–20.1)
CALCIUM SERPL-MCNC: 9.4 MG/DL (ref 8.4–10.2)
CHLORIDE SERPL-SCNC: 109 MMOL/L (ref 98–107)
CO2 SERPL-SCNC: 27 MMOL/L (ref 23–31)
CREAT SERPL-MCNC: 0.74 MG/DL (ref 0.55–1.02)
CREAT/UREA NIT SERPL: 21
DEPRECATED CALCIDIOL+CALCIFEROL SERPL-MC: 32.5 NG/ML (ref 30–80)
GFR SERPLBLD CREATININE-BSD FMLA CKD-EPI: >60 MLS/MIN/1.73/M2
GLUCOSE SERPL-MCNC: 130 MG/DL (ref 82–115)
POTASSIUM SERPL-SCNC: 3.6 MMOL/L (ref 3.5–5.1)
PTH-INTACT SERPL-MCNC: 154.4 PG/ML (ref 8.7–77)
SODIUM SERPL-SCNC: 144 MMOL/L (ref 136–145)

## 2023-10-02 PROCEDURE — 82306 VITAMIN D 25 HYDROXY: CPT

## 2023-10-02 PROCEDURE — 80048 BASIC METABOLIC PNL TOTAL CA: CPT

## 2023-10-02 PROCEDURE — 83970 ASSAY OF PARATHORMONE: CPT

## 2023-10-02 PROCEDURE — 36415 COLL VENOUS BLD VENIPUNCTURE: CPT

## 2023-10-10 ENCOUNTER — OFFICE VISIT (OUTPATIENT)
Dept: INTERNAL MEDICINE | Facility: CLINIC | Age: 83
End: 2023-10-10
Payer: MEDICARE

## 2023-10-10 VITALS
HEART RATE: 68 BPM | TEMPERATURE: 98 F | WEIGHT: 132 LBS | OXYGEN SATURATION: 97 % | SYSTOLIC BLOOD PRESSURE: 125 MMHG | RESPIRATION RATE: 20 BRPM | BODY MASS INDEX: 20.67 KG/M2 | DIASTOLIC BLOOD PRESSURE: 64 MMHG

## 2023-10-10 DIAGNOSIS — E83.52 HYPERCALCEMIA: ICD-10-CM

## 2023-10-10 DIAGNOSIS — I10 HYPERTENSION, UNSPECIFIED TYPE: ICD-10-CM

## 2023-10-10 DIAGNOSIS — E21.3 HYPERPARATHYROIDISM: ICD-10-CM

## 2023-10-10 DIAGNOSIS — F03.90 DEMENTIA, UNSPECIFIED DEMENTIA SEVERITY, UNSPECIFIED DEMENTIA TYPE, UNSPECIFIED WHETHER BEHAVIORAL, PSYCHOTIC, OR MOOD DISTURBANCE OR ANXIETY: ICD-10-CM

## 2023-10-10 DIAGNOSIS — T14.8XXA BRUISING: Primary | ICD-10-CM

## 2023-10-10 PROCEDURE — 99213 OFFICE O/P EST LOW 20 MIN: CPT | Mod: PBBFAC

## 2023-10-10 NOTE — PROGRESS NOTES
INTERNAL MEDICINE RESIDENT CLINIC  CLINIC NOTE    Patient Name: Guillermina Stewart  YOB: 1940    PRESENTING HISTORY       History of Present Illness:  Ms. Guillermina Stewart is a 83 y.o. female who is coming in for a follow up visit. She lives by herself but has either her daughter or son there for the majority of the day/night (by herself for a few hours). She has trouble with memory and does not remember her childrens' names. There have not been any accidents related to poor memory such as leaving the stove/faucet on, wandering around the neighborhood or getting lost. Her mood has been OK and she has not been having anger outbursts. She walks good and hasn't had any falls that daughter knows of. She is not oriented to place or time. Continues on memantine. Daughter states she is having right hand swelling for last month that comes and goes. States she gives her tylenol or advil occasionally when patient complains of tightness and pain.     ROS  Constitutional: no fever/chills  EENT: no sore throat, ear pain, sinus pain/congestion, nasal congestion/drainage  Respiratory: no cough, no wheezing, no shortness of breath  Cardiovascular: no chest pain, no palpitations, no edema  Gastrointestinal: no nausea, vomiting, or diarrhea. No abdominal pain  Genitourinary: no dysuria, no urinary frequency or urgency, no hematuria  Integumentary: no skin rash or abnormal lesion  Neurologic: no headache, no dizziness, no weakness or numbness      PAST HISTORY:     Past Medical History:   Diagnosis Date    HLD (hyperlipidemia)     HTN (hypertension)     Memory loss     Osteoporosis        Past Surgical History:   Procedure Laterality Date    hemorroidectomy      HYSTERECTOMY         Family History   Problem Relation Age of Onset    Anxiety disorder Father        Social History     Socioeconomic History    Marital status:     Number of children: 3   Occupational History    Occupation: retired   Tobacco Use    Smoking  status: Every Day     Current packs/day: 1.00     Average packs/day: 1 pack/day for 60.0 years (60.0 ttl pk-yrs)     Types: Cigarettes    Smokeless tobacco: Never   Substance and Sexual Activity    Alcohol use: Not Currently    Drug use: Never    Sexual activity: Not Currently     Partners: Female     Social Determinants of Health     Financial Resource Strain: Low Risk  (5/19/2023)    Overall Financial Resource Strain (CARDIA)     Difficulty of Paying Living Expenses: Not hard at all   Food Insecurity: No Food Insecurity (5/19/2023)    Hunger Vital Sign     Worried About Running Out of Food in the Last Year: Never true     Ran Out of Food in the Last Year: Never true   Transportation Needs: No Transportation Needs (6/23/2022)    PRAPARE - Transportation     Lack of Transportation (Medical): No     Lack of Transportation (Non-Medical): No   Physical Activity: Inactive (5/19/2023)    Exercise Vital Sign     Days of Exercise per Week: 0 days     Minutes of Exercise per Session: 0 min   Stress: No Stress Concern Present (5/19/2023)    [employer] [place] [employer] - Occupational Stress Questionnaire     Feeling of Stress : Not at all   Social Connections: Socially Isolated (5/19/2023)    Social Connection and Isolation Panel [NHANES]     Frequency of Communication with Friends and Family: More than three times a week     Frequency of Social Gatherings with Friends and Family: More than three times a week     Attends Jehovah's witness Services: Never     Active Member of Clubs or Organizations: No     Attends Club or Organization Meetings: Never     Marital Status:    Housing Stability: Low Risk  (5/19/2023)    Housing Stability Vital Sign     Unable to Pay for Housing in the Last Year: No     Number of Places Lived in the Last Year: 1     Unstable Housing in the Last Year: No       MEDICATIONS & ALLERGIES:     Current Outpatient Medications on File Prior to Visit   Medication Sig    aspirin (ECOTRIN) 81 MG EC  "tablet Take 1 tablet (81 mg total) by mouth once daily.    lisinopriL (PRINIVIL,ZESTRIL) 5 MG tablet Take 1 tablet (5 mg total) by mouth once daily.    memantine (NAMENDA XR) 21 mg CSpX Take 21 mg by mouth once daily.    polyethylene glycol (GLYCOLAX) 17 gram PwPk Take 17 g by mouth once daily.    pravastatin (PRAVACHOL) 40 MG tablet Take 1 tablet (40 mg total) by mouth once daily.    PROLIA 60 mg/mL Syrg Inject into skin of abdomen, upper arm, or upper thigh every 6 months    amoxicillin-clavulanate 875-125mg (AUGMENTIN) 875-125 mg per tablet Take 1 tablet by mouth every 12 (twelve) hours. (Patient not taking: Reported on 10/10/2023)     No current facility-administered medications on file prior to visit.       Review of patient's allergies indicates:   Allergen Reactions    Propoxyphene     Tramadol Nausea And Vomiting       OBJECTIVE:   Vital Signs:  Vitals:    10/10/23 0806   BP: 125/64   Pulse: 68   Resp: 20   Temp: 98.2 °F (36.8 °C)   TempSrc: Oral   SpO2: 97%   Weight: 59.9 kg (132 lb)       No results found for this or any previous visit (from the past 24 hour(s)).      Physical Exam    General - Appears comfortable, does not converse much. Daughter answering most questions  Mental Status -not speaking much, oriented to person not place or time   HEENT - no rhinorrhea   Cardiac - RRR, no murmurs, rubs, or gallops; no edema in LE   Respiratory - breathing comfortably; clear to ascultation bilaterally   Abdominal - nondistended, soft, nontender to palpation   Extremities - LE, UE, and joints are nonerythematous and non swollen  Skin - thin skin with no rashes or bruises seen on skin          Laboratory  Lab Results   Component Value Date    WBC 8.19 09/02/2023    HGB 14.7 09/02/2023    HCT 43.7 09/02/2023    MCV 95.0 (H) 09/02/2023     09/02/2023     @GOERONCLF71(GLU,NA,K,Cl,CO2,BUN,Creatinine,Calcium,MG)@  No results found for: "INR", "PROTIME"  Lab Results   Component Value Date    HGBA1C 5.7 " "01/30/2023     No results for input(s): "POCTGLUCOSE" in the last 72 hours.        ASSESSMENT & PLAN:     Recurrent UTI  -has had at least 3 UTI's in last year  -urology appt 10/19/23    Osteoporosis  -likely secondary to smoking history, age and history of hysterectomy; now also has hyperparathyroidism  -Prolia injections started 7/2016, q6months. Continue  -increase Vit D to 2000 daily.   -DEXA 2022 showed osteoporosis and stable from 2020     Hyperparathyroidism  -has osteoporosis so meets criteria for surgery  -Endocrinology appointment scheduled for August 2023 in about 1 month from now.   -have tried to collect urine calcium and cr before but unable. Will try again today  -will see ent to move forward with parathyroidectomy 10/18/23    TIRADS 5 thyroid nodules  -has TIRADS 5 subcentimeter nodules to small to biopsy-rec is for repeat US in 1 year.  -will get repeat US in 10/2023    Dementia  Concern for Alzheimer's  -continue memantine 21mg daily  -B12, Folate, Syphilis oracio, LATOYA, RF, ESR, CRP are normal  -Assessed medical decision-making capacity and she does not have capacity. Her oldest daughter, Patricia, would be the medical decision maker. Her number is in the chart  -continue following with Geriatrics clinic      HTN  -advised keep BP log  -low salt diet  -She is no longer taking norvasc  -continue lisinopril 5mg daily  -d/c'd HCTZ in past b/c hypercalcemia    Tobacco use  -is smokiing > 1/2 ppd  -not addressed this visit    Hyperlipidemia  -continue pravastatin 40mg    Abdominal Aortic Aneurysm  -Inferior abdominal aortic aneurysm 4.3 cm. - noted on CT scan 11/2022  -rpt ultrasound 8/17/23: does not appear larger    Health maintenance  -due for tdap, pneumovax, shingrix, flu - patient refusing vaccines  -doesn't need routine cancer screenings given age (mammogram, pap smear, low-dose CT).   -patient has >30 pack year smoking history    Other patient information  2021-lives in [place]. Her  passed away " over 15 years ago. Grandson passed away. Used to arm-wrestle and likes to watch wrestling on TV. 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 She has 2 daughters and 1 son.  2022-currently lives with her son. She does drive and visit friends' houses.  2023 - Ms. Stewart no longer drives. She is doing OK, living in her house with her son and daughter keeping a close eye on her.    Follow up in 6 months.     Juliet Michelle MD  Internal Medicine PGY-1

## 2023-10-18 ENCOUNTER — OFFICE VISIT (OUTPATIENT)
Dept: OTOLARYNGOLOGY | Facility: CLINIC | Age: 83
End: 2023-10-18
Payer: MEDICARE

## 2023-10-18 ENCOUNTER — OFFICE VISIT (OUTPATIENT)
Dept: FAMILY MEDICINE | Facility: CLINIC | Age: 83
End: 2023-10-18
Payer: MEDICARE

## 2023-10-18 VITALS
SYSTOLIC BLOOD PRESSURE: 131 MMHG | TEMPERATURE: 98 F | RESPIRATION RATE: 20 BRPM | DIASTOLIC BLOOD PRESSURE: 79 MMHG | HEART RATE: 75 BPM | OXYGEN SATURATION: 97 % | HEIGHT: 67 IN | BODY MASS INDEX: 21.04 KG/M2 | WEIGHT: 134.06 LBS

## 2023-10-18 VITALS — TEMPERATURE: 99 F | SYSTOLIC BLOOD PRESSURE: 140 MMHG | DIASTOLIC BLOOD PRESSURE: 83 MMHG | HEART RATE: 69 BPM

## 2023-10-18 DIAGNOSIS — F03.90 DEMENTIA, UNSPECIFIED DEMENTIA SEVERITY, UNSPECIFIED DEMENTIA TYPE, UNSPECIFIED WHETHER BEHAVIORAL, PSYCHOTIC, OR MOOD DISTURBANCE OR ANXIETY: ICD-10-CM

## 2023-10-18 DIAGNOSIS — R68.89 FORGETFULNESS: ICD-10-CM

## 2023-10-18 DIAGNOSIS — E21.0 PRIMARY HYPERPARATHYROIDISM: ICD-10-CM

## 2023-10-18 PROCEDURE — 99214 OFFICE O/P EST MOD 30 MIN: CPT | Mod: PBBFAC | Performed by: FAMILY MEDICINE

## 2023-10-18 PROCEDURE — 99213 OFFICE O/P EST LOW 20 MIN: CPT | Mod: PBBFAC,27 | Performed by: STUDENT IN AN ORGANIZED HEALTH CARE EDUCATION/TRAINING PROGRAM

## 2023-10-18 RX ORDER — MEMANTINE HYDROCHLORIDE 28 MG/1
1 CAPSULE, EXTENDED RELEASE ORAL DAILY
Qty: 30 CAPSULE | Refills: 2 | Status: SHIPPED | OUTPATIENT
Start: 2023-10-18

## 2023-10-18 RX ORDER — LISINOPRIL 10 MG/1
10 TABLET ORAL DAILY
Qty: 90 TABLET | Refills: 3 | Status: SHIPPED | OUTPATIENT
Start: 2023-10-18 | End: 2024-10-17

## 2023-10-18 NOTE — PATIENT INSTRUCTIONS
Take BP meds in the morning with your breakfast.  Please check your blood pressure with your home cuff approximately 3-4 hours after the morning meds (approx 11am-12pm) but before eating lunch.  Write it down in the log daily, along with the HR/Pulse.    If you notice the top number of the blood pressure being higher than 140s consistently, or less than 100 consistently, or if you feel headaches, dizziness, lightheaded, or chest pain, contact the clinic immediately.   If your HR is higher than 100 or less than 55 consistently and/or you feel palpitations, or like you might faint, again please let us know immediately.  If it is weekend, go to urgent care.   Call clinic at [phone] and ask for your doctors' nurse for assistance.

## 2023-10-18 NOTE — PROGRESS NOTES
Subjective:       Patient ID: Guillermina Stewart is a 83 y.o. female.    Chief Complaint: Referral ([facility] referral for dementia.)    HPI   80yo female PMH osteoporosis, HTN, depression, hyperparathyroidism, hypercalcemia, HLD, memory deficit, tobacco use. Presents to the [facility] for follow up for forgetfulness.     Patient was started on Namenda and has been taking assist December, patient's daughter says that she has not noticed any difference.  Patient is able to dress herself, however she requires help with all her other ADLs.  Patient requires help with bathing, and obtaining food.  Patient's spells are taking care of by patient's daughter.  Patient had an incident where she was left alone and she wandered outside and it was hot.  Patient does not drive.  Since then patient is been with her family who watches over.  Furthermore patient had incident where she was hypotensive and dizzy last month her blood pressure at that time was 180 systolic and required clonidine.  Patient has been on a lower dose of lisinopril for the past 3 months.  When asking patient she is only alert to her person, she does not know her age, where her location is, or the time or the date.  Patient denies any chest pain, palpitations, shortness of breath, wheezing.  Patient's memory issues are still an issue.     Memory deficit:    - two different daughters raised concern over forgetfullness of relatives' names, location of things around her house; pt does not feel she has memory issues but does admit to forgetting where she places things, daughter at today's visit says forgetfulness worsening  - patient not oriented to birthdate or current year  - pt and daughter denied visual/auditory hallucinations, tremors, or gait disturbances  - reported mood swings by daughter, not as bad as before  -  passes away 15y ago; lives in [place] with her son  -  B12 elevated at 914, Folate WNL, Syphilis/HIV/Hep panel negative, LATOYA  negative, RF IgA 1 IgG 3 IgM 0 , ESR 13 WNL, CRP 0.336 WNL, all 7/2021  - no side effects of donepezil 10mg qD    US thyroid 8/2021:   IMPRESSION  - Multiple bilateral thyroid nodules, none of which meet biopsy criteria secondary to size below threshold. Annual surveillance thyroid ultrasound recommended for the subcentimeter TI-RADS 5 nodules.  - Hypoechoic posterior right nodule may be extrathyroidal and could represent parathyroid gland, otherwise relatively indistinct and limited characterization. Additional assessment with nuclear medicine imaging and/or parathyroid 4D protocol CT recommended  - pt refused further work-up of of thyroid and parathyroidism; PCP doubts her decision-making capacity considering memory and mood issues. Has not followed up with Endocrinology regarding her hyperparathyroidism; per daughter visit is rescheduled in 8/2022      MRI Brain 2/2022:   - no restricted diffusion, hemorrhage or edema moderate scattered and patchy T2/FLAIR hyperintensities in the subcortical and periventricular white matter, with prior lacunar infarcts in the basal ganglia, thalami and leland.  - no mass effect or midline shift  - moderate parenchymal volume loss   - basal cisterns are patent  - no hydrocephalus or abnormal extra-axial fluid collection  - major intracranial flow voids are patent  - fluid layering in the left maxillary sinus.  IMPRESSION:  1.  No acute intracranial abnormality identified.  2.  Moderate chronic microvascular ischemic changes and parenchymal  volume loss.    SLUMS 5/30, pos screen for dementia as of 6/2022  Education level: 8th grade    Nidhi assessment:   No recent falls  Appetite is good  Sleep is good  Bowel/bladder normal and has no complaints  ADLs/iADLS - able to dress herself, needs assistance with bathing, independent of transferring, cleaning - son cooks but she is able to do so for herself, daughter manages finances but always has   No driving issues, no wrecks or  "tickets  Ambulates without issue  Vision is good  Memory similar to previous (daughter present for entire exam),   Pleasant mood at times of visit, daughter reports mood swings at times  Lives with sonJacob      Medications:   Centrum MV   Amlodipine 5mg qD   Prolia q6m   Donepezil 10mg   Lisinopril 20mg qD  Pravastatin 40mg qD     Review of Systems    Denies HA, dizziness, vision changes, edema, chest pain/congestion, palpitations, GI/ issues  Objective:      Physical Exam    Blood pressure 131/79, pulse 75, temperature 97.9 °F (36.6 °C), temperature source Oral, resp. rate 20, height 5' 7.01" (1.702 m), weight 60.8 kg (134 lb 0.6 oz), SpO2 97 %.    General: no acute distress, accompanied by daughter   CVS: regular rate, 2+ radial pulses bilaterally, no edema  Resp: effort normal, no respiratory distress on room air  GI: soft, non-tender, non-distended  Psych: appropriate and cooperative        Assessment:       1. Dementia, unspecified dementia severity, unspecified dementia type, unspecified whether behavioral, psychotic, or mood disturbance or anxiety          Plan:   Dementia, slums today is approximately 2/30.    -patient has been on Namenda, increase dosage to 28    Primary hyperparathyroidism:   -patient follows up with endocrinology, patient received a shot of denosumab.  -Patient has BMP, urine calcium and urine creatinine pending for this month.      Hypertension:   -patient had ED visit where her blood pressure was 180 systolic.  Patient is currently on lisinopril 5 mg.  Patient's blood pressure is currently 131/79.  May up titrate lisinopril and have patient check her blood pressure with her family and come back for follow-up visit.  -patient has a infra abdominal aortic aneurysm that measures proximally 3.9 cm.  Imperative for blood pressure control.      Telephone visit in 2 weeks with BP logs    Jennie Weldon MD  Internal Medicine Resident PGY-III  [facility] " Medicine  10/18/2023    10/18/2023

## 2023-10-18 NOTE — PROGRESS NOTES
[facility]  Otolaryngology Clinic Note    Guillremina Stewart  Encounter Date: 10/18/2023  YOB: 1940  Physician: POLLY Babin MD    Chief Complaint:  Parathyroid adenoma    HPI: Guillermina Stewart is a 83 y.o. female with a past medical history of osteoporosis, dementia, hyperlipidemia, hypertension who presents as a referral from endocrinology for parathyroid adenoma.  Patient underwent CT scan 1 year ago which showed right parathyroid adenoma measuring 7 mm.  Patient also had a history of increased PTH with increased calcium levels.  Patient has been getting Prolia shots for osteoporosis.  Daughter is at bedside and is the patient's primary caregiver.  Daughter notes that patient has not had any kidney stones, constipation, pain, nausea decreased appetite.  Patient does endorse increased urination at night.   Per chart review patient also had a thyroid nodule that is 1 cm in dimension.  Patient has not gotten a follow-up imaging  Patient has never undergone having neck surgery previously.  Patient has a 60 pack-year smoking history.  Denies alcohol use.    ROS:   General: Negative except per HPI  Skin: Denies rash, ulcer, or lesion.  Eyes: Denies vision changes or diplopia.  Ears: Negative except per HPI  Nose: Negative except per HPI  Throat/mouth: Negative except per HPI  Cardiovascular: Negative except per HPI  Respiratory: Negative except per HPI  Neck: Negative except per HPI  Endocrine: Negative except per HPI  Neurologic: Negative except per HPI    Other 10-point review of systems negative except per HPI      Review of patient's allergies indicates:   Allergen Reactions    Propoxyphene     Tramadol Nausea And Vomiting       Past Medical History:   Diagnosis Date    HLD (hyperlipidemia)     HTN (hypertension)     Memory loss     Osteoporosis        Past Surgical History:   Procedure Laterality Date    hemorroidectomy      HYSTERECTOMY         Social History      Socioeconomic History    Marital status:     Number of children: 3   Occupational History    Occupation: retired   Tobacco Use    Smoking status: Every Day     Current packs/day: 1.00     Average packs/day: 1 pack/day for 60.0 years (60.0 ttl pk-yrs)     Types: Cigarettes    Smokeless tobacco: Never   Substance and Sexual Activity    Alcohol use: Not Currently    Drug use: Never    Sexual activity: Not Currently     Partners: Female     Social Determinants of Health     Financial Resource Strain: Low Risk  (5/19/2023)    Overall Financial Resource Strain (CARDIA)     Difficulty of Paying Living Expenses: Not hard at all   Food Insecurity: No Food Insecurity (5/19/2023)    Hunger Vital Sign     Worried About Running Out of Food in the Last Year: Never true     Ran Out of Food in the Last Year: Never true   Transportation Needs: No Transportation Needs (6/23/2022)    PRAPARE - Transportation     Lack of Transportation (Medical): No     Lack of Transportation (Non-Medical): No   Physical Activity: Inactive (5/19/2023)    Exercise Vital Sign     Days of Exercise per Week: 0 days     Minutes of Exercise per Session: 0 min   Stress: No Stress Concern Present (5/19/2023)    Citizen of [place] of Occupational Health - Occupational Stress Questionnaire     Feeling of Stress : Not at all   Social Connections: Socially Isolated (5/19/2023)    Social Connection and Isolation Panel [NHANES]     Frequency of Communication with Friends and Family: More than three times a week     Frequency of Social Gatherings with Friends and Family: More than three times a week     Attends Orthodox Services: Never     Active Member of Clubs or Organizations: No     Attends Club or Organization Meetings: Never     Marital Status:    Housing Stability: Low Risk  (5/19/2023)    Housing Stability Vital Sign     Unable to Pay for Housing in the Last Year: No     Number of Places Lived in the Last Year: 1     Unstable Housing in the  Last Year: No       Family History   Problem Relation Age of Onset    Anxiety disorder Father        Outpatient Encounter Medications as of 10/18/2023   Medication Sig Dispense Refill    aspirin (ECOTRIN) 81 MG EC tablet Take 1 tablet (81 mg total) by mouth once daily. 90 tablet 1    lisinopriL 10 MG tablet Take 1 tablet (10 mg total) by mouth once daily. 90 tablet 3    memantine (NAMENDA XR) 28 mg CSpX Take 1 capsule (28 mg total) by mouth once daily. 30 capsule 2    polyethylene glycol (GLYCOLAX) 17 gram PwPk Take 17 g by mouth once daily.  0    pravastatin (PRAVACHOL) 40 MG tablet Take 1 tablet (40 mg total) by mouth once daily. 90 tablet 1    PROLIA 60 mg/mL Syrg Inject into skin of abdomen, upper arm, or upper thigh every 6 months 1 each 0    amoxicillin-clavulanate 875-125mg (AUGMENTIN) 875-125 mg per tablet Take 1 tablet by mouth every 12 (twelve) hours. (Patient not taking: Reported on 10/10/2023) 14 tablet 0    [DISCONTINUED] lisinopriL (PRINIVIL,ZESTRIL) 5 MG tablet Take 1 tablet (5 mg total) by mouth once daily. 90 tablet 0    [DISCONTINUED] memantine (NAMENDA XR) 21 mg CSpX Take 21 mg by mouth once daily. 90 each 1     No facility-administered encounter medications on file as of 10/18/2023.       Physical Exam:  Vitals:    10/18/23 0948   BP: (!) 140/83   BP Location: Right arm   Patient Position: Sitting   BP Method: Medium (Automatic)   Pulse: 69   Temp: 98.7 °F (37.1 °C)   TempSrc: Oral       Constitutional  General Appearance: well nourished, well-developed, able to respond to some questions however is not a reliable   HEENT  Eyes: PEERLA, EOMI, normal conjunctivae  Ears: Hearing well at conversation level;  Nose: septum midline, no inferior turbinate hypertrophy, no polyps, moist mucosa without erythema or blue hue  OC/OP:  Upper and lower dentures, no oral lesions, tonsils are symmetric   Nasopharynx, Hypopharynx, and Larynx:    Indirect: attempted, limited view due to patient  intolerance  Neck: soft, non-tender, no palpable lymph nodes   Thyroid region- no nodules or goiter  Neuro: CN II - XII intact bilaterally  Cardiovascular: peripheral pulses palpable  Respiratory: non-labored respirations  Psychiatric:  Has dementia    Pertinent Data:  ? LABS:  .5  Ca 9.4  Vit D 32.5    ? AUDIO:  ? CT Scan:    Imaging:   I personally reviewed the following images:    Summary of Outside Records:      Assessment/Plan:  Guillermina Stewart is a 83 y.o. female who presents for evaluation of a right parathyroid adenoma with symptoms of osteoporosis and nighttime urination.  On labs patient has elevated PTH but normal calcium and vitamin-D    - Discussed observation with patient's daughter.  Daughter would like to observe if possible  - Will repeat urine calcium, renal function panel, PTH, vitamin-D and DEXA scan in 6 months  - We will also obtain ultrasound of the thyroid to follow-up nodule on thyroid  - Will return in 6 mo once imaging and labs are complete    POLLY Babin MD  [facility] Department of Otolaryngology  [facility]     POLLY Babin MD  [facility] Department of Otolaryngology  [facility]

## 2023-10-18 NOTE — PROGRESS NOTES
I reviewed the history and physical exam with the resident.   I agree with findings and plan.    Yaron Duran M.D.

## 2023-10-19 ENCOUNTER — OFFICE VISIT (OUTPATIENT)
Dept: UROLOGY | Facility: CLINIC | Age: 83
End: 2023-10-19
Payer: MEDICARE

## 2023-10-19 VITALS
OXYGEN SATURATION: 97 % | WEIGHT: 135.81 LBS | HEART RATE: 76 BPM | BODY MASS INDEX: 21.31 KG/M2 | HEIGHT: 67 IN | DIASTOLIC BLOOD PRESSURE: 79 MMHG | TEMPERATURE: 98 F | SYSTOLIC BLOOD PRESSURE: 123 MMHG | RESPIRATION RATE: 20 BRPM

## 2023-10-19 DIAGNOSIS — N39.0 RECURRENT UTI: Primary | ICD-10-CM

## 2023-10-19 PROCEDURE — 99213 PR OFFICE/OUTPT VISIT, EST, LEVL III, 20-29 MIN: ICD-10-PCS | Mod: S$PBB,,, | Performed by: NURSE PRACTITIONER

## 2023-10-19 PROCEDURE — 1159F MED LIST DOCD IN RCRD: CPT | Mod: CPTII,,, | Performed by: NURSE PRACTITIONER

## 2023-10-19 PROCEDURE — 3074F PR MOST RECENT SYSTOLIC BLOOD PRESSURE < 130 MM HG: ICD-10-PCS | Mod: CPTII,,, | Performed by: NURSE PRACTITIONER

## 2023-10-19 PROCEDURE — 1126F AMNT PAIN NOTED NONE PRSNT: CPT | Mod: CPTII,,, | Performed by: NURSE PRACTITIONER

## 2023-10-19 PROCEDURE — 1101F PR PT FALLS ASSESS DOC 0-1 FALLS W/OUT INJ PAST YR: ICD-10-PCS | Mod: CPTII,,, | Performed by: NURSE PRACTITIONER

## 2023-10-19 PROCEDURE — 3078F PR MOST RECENT DIASTOLIC BLOOD PRESSURE < 80 MM HG: ICD-10-PCS | Mod: CPTII,,, | Performed by: NURSE PRACTITIONER

## 2023-10-19 PROCEDURE — 1101F PT FALLS ASSESS-DOCD LE1/YR: CPT | Mod: CPTII,,, | Performed by: NURSE PRACTITIONER

## 2023-10-19 PROCEDURE — 1126F PR PAIN SEVERITY QUANTIFIED, NO PAIN PRESENT: ICD-10-PCS | Mod: CPTII,,, | Performed by: NURSE PRACTITIONER

## 2023-10-19 PROCEDURE — 3288F FALL RISK ASSESSMENT DOCD: CPT | Mod: CPTII,,, | Performed by: NURSE PRACTITIONER

## 2023-10-19 PROCEDURE — 1159F PR MEDICATION LIST DOCUMENTED IN MEDICAL RECORD: ICD-10-PCS | Mod: CPTII,,, | Performed by: NURSE PRACTITIONER

## 2023-10-19 PROCEDURE — 3078F DIAST BP <80 MM HG: CPT | Mod: CPTII,,, | Performed by: NURSE PRACTITIONER

## 2023-10-19 PROCEDURE — 3074F SYST BP LT 130 MM HG: CPT | Mod: CPTII,,, | Performed by: NURSE PRACTITIONER

## 2023-10-19 PROCEDURE — 99214 OFFICE O/P EST MOD 30 MIN: CPT | Mod: PBBFAC | Performed by: NURSE PRACTITIONER

## 2023-10-19 PROCEDURE — 99213 OFFICE O/P EST LOW 20 MIN: CPT | Mod: S$PBB,,, | Performed by: NURSE PRACTITIONER

## 2023-10-19 PROCEDURE — 3288F PR FALLS RISK ASSESSMENT DOCUMENTED: ICD-10-PCS | Mod: CPTII,,, | Performed by: NURSE PRACTITIONER

## 2023-10-19 NOTE — PROGRESS NOTES
Chief Complaint: No chief complaint on file.      HPI:  Patient is a 82-year-old female referred to Urology for recurrent UTI.  Patient has a history of recurrent UTIs on last patient visit to was treated with Augmentin for bacteria cultured UTI.  One-month after last treatment patient complaining of urologic symptoms similar to a UTI with dysuria culture ordered results of less than 10,000 colonies of E coli present.  No treatment rendered due to probable cause of contamination.  Today patient presents without any dysuria, urinary frequency, urinary urgency, urinary hesitancy, urinary incontinence, gross hematuria, urinary retention.      Allergies:  Review of patient's allergies indicates:   Allergen Reactions    Propoxyphene     Tramadol Nausea And Vomiting       Medications:  Current Outpatient Medications   Medication Sig Dispense Refill    amoxicillin-clavulanate 875-125mg (AUGMENTIN) 875-125 mg per tablet Take 1 tablet by mouth every 12 (twelve) hours. (Patient not taking: Reported on 10/10/2023) 14 tablet 0    aspirin (ECOTRIN) 81 MG EC tablet Take 1 tablet (81 mg total) by mouth once daily. 90 tablet 1    lisinopriL 10 MG tablet Take 1 tablet (10 mg total) by mouth once daily. 90 tablet 3    memantine (NAMENDA XR) 28 mg CSpX Take 1 capsule (28 mg total) by mouth once daily. 30 capsule 2    polyethylene glycol (GLYCOLAX) 17 gram PwPk Take 17 g by mouth once daily.  0    pravastatin (PRAVACHOL) 40 MG tablet Take 1 tablet (40 mg total) by mouth once daily. 90 tablet 1    PROLIA 60 mg/mL Syrg Inject into skin of abdomen, upper arm, or upper thigh every 6 months 1 each 0     No current facility-administered medications for this visit.       Review of Systems:  General: No fever, chills, fatigability, or weight loss.  Skin: No rashes, itching, or changes in color or texture of skin.  Chest: Denies THOMAS, cyanosis, wheezing, cough, and sputum production.  Abdomen: Appetite fine. No weight loss. Denies diarrhea,  abdominal pain, hematemesis, or blood in stool.  Musculoskeletal: No joint stiffness or swelling. Denies back pain.  : As above.  All other review of systems negative.    PMH:  Past Medical History:   Diagnosis Date    HLD (hyperlipidemia)     HTN (hypertension)     Memory loss     Osteoporosis        PSH:  Past Surgical History:   Procedure Laterality Date    hemorroidectomy      HYSTERECTOMY         FamHx:  Family History   Problem Relation Age of Onset    Anxiety disorder Father        SocHx:  Social History     Socioeconomic History    Marital status:     Number of children: 3   Occupational History    Occupation: retired   Tobacco Use    Smoking status: Every Day     Current packs/day: 1.00     Average packs/day: 1 pack/day for 60.0 years (60.0 ttl pk-yrs)     Types: Cigarettes    Smokeless tobacco: Never   Substance and Sexual Activity    Alcohol use: Not Currently    Drug use: Never    Sexual activity: Not Currently     Partners: Female     Social Determinants of Health     Financial Resource Strain: Low Risk  (5/19/2023)    Overall Financial Resource Strain (CARDIA)     Difficulty of Paying Living Expenses: Not hard at all   Food Insecurity: No Food Insecurity (5/19/2023)    Hunger Vital Sign     Worried About Running Out of Food in the Last Year: Never true     Ran Out of Food in the Last Year: Never true   Transportation Needs: No Transportation Needs (6/23/2022)    PRAPARE - Transportation     Lack of Transportation (Medical): No     Lack of Transportation (Non-Medical): No   Physical Activity: Inactive (5/19/2023)    Exercise Vital Sign     Days of Exercise per Week: 0 days     Minutes of Exercise per Session: 0 min   Stress: No Stress Concern Present (5/19/2023)    [place] of Occupational Health - Occupational Stress Questionnaire     Feeling of Stress : Not at all   Social Connections: Socially Isolated (5/19/2023)    Social Connection and Isolation Panel [NHANES]     Frequency of  Communication with Friends and Family: More than three times a week     Frequency of Social Gatherings with Friends and Family: More than three times a week     Attends Gnosticist Services: Never     Active Member of Clubs or Organizations: No     Attends Club or Organization Meetings: Never     Marital Status:    Housing Stability: Low Risk  (5/19/2023)    Housing Stability Vital Sign     Unable to Pay for Housing in the Last Year: No     Number of Places Lived in the Last Year: 1     Unstable Housing in the Last Year: No       Physical Exam:  There were no vitals filed for this visit.  General: A&Ox3, no apparent distress, no deformities  Neck: No masses, normal thyroid  Lungs: CTA sonido, no use of accessory muscles  Heart: RRR, no arrhythmias  Abdomen: Soft, NT, ND, no masses, no hernias, no hepatosplenomegaly  Lymphatic: Neck and groin nodes negative  Skin: The skin is warm and dry. No jaundice.  Ext: No c/c/e.          Impression:  Recurrent UTI      Plan: Instructed patient on timed voiding every 2-3 hrs, double voiding, avoidance of bladder irritants such as alcohol, citrus foods, chocolate, caffeinated drinks, energy drinks, spicy foods, sugar, caffeine products, sodas. Instructed patient to avoid drinking fluids 1-2 hours prior to bedtime & void immediately before bedtime.  RTC 6 months.  If patient develops any abnormally urologic symptoms notify clinic to be re-evaluate treated.

## 2023-10-23 PROBLEM — N39.0 RECURRENT UTI: Status: RESOLVED | Noted: 2023-07-20 | Resolved: 2023-10-23

## 2023-11-01 ENCOUNTER — OFFICE VISIT (OUTPATIENT)
Dept: FAMILY MEDICINE | Facility: CLINIC | Age: 83
End: 2023-11-01
Payer: MEDICARE

## 2023-11-01 DIAGNOSIS — Z01.89 ENCOUNTER FOR GERIATRIC ASSESSMENT: ICD-10-CM

## 2023-11-01 DIAGNOSIS — Z72.0 TOBACCO USER: ICD-10-CM

## 2023-11-01 DIAGNOSIS — Z79.899 ENCOUNTER FOR MEDICATION REVIEW: Primary | ICD-10-CM

## 2023-11-01 DIAGNOSIS — F03.90 DEMENTIA, UNSPECIFIED DEMENTIA SEVERITY, UNSPECIFIED DEMENTIA TYPE, UNSPECIFIED WHETHER BEHAVIORAL, PSYCHOTIC, OR MOOD DISTURBANCE OR ANXIETY: ICD-10-CM

## 2023-11-01 DIAGNOSIS — I10 HYPERTENSION, UNSPECIFIED TYPE: ICD-10-CM

## 2023-11-01 DIAGNOSIS — E78.5 HYPERLIPIDEMIA, UNSPECIFIED HYPERLIPIDEMIA TYPE: ICD-10-CM

## 2023-11-01 NOTE — PROGRESS NOTES
[facility]   Geriatric Clinic       Subjective  I have reviewed the patient name, date of birth   I have verbally reviewed the past medical history, active medication list, and allergies with the patient (parent/legal guardian for a patient under the age of 18 years old) and verified with picture ID if applicable.  I have verified that the patient is in the state [facility].    This is a telemedicine note. I have reviewed the   patient's name: Guillermina Stewart   :  1940    I verbally reviewed the past medical history, active medication list, and allergy list with the patient and verified with a picture ID if applicable. I have verified that this patient is in the state of [place]. The patient has consented to be treated remotely by telemedicine appointment via [facility] approved interactive audio format. The patient does not have access to a working audiovisualformat. The patient stated that they understood and accepted the privacy and security risks to their information at their location.  Originating site (where the patient is located): Patient Home  Distant site (where the provider is located): [facility]    Time spent with the patient exceeds 15 minutes [id number] minutes, over 50% of which was used for education and counseling regarding medical conditions, current medications including risk/benefit and side effects/adverse events, OTC uses/doses, home self care and contact precautions, and red flags and indications for immediate medical attention. The patient is receptive, expresses understanding, and is agreeable to plan. All questions answered.    Start time: 901  End time: 0936    84yo female PMH dementia, hypercalcemia, hyperparathyroid, HLD, HTN, osteoporosis, tobacco user. Last seen in [facility] 10/18/2023 for dementia. PCP Viviana. Leila Valdes  contacted with pt at [facility]     HTN  Oct 24- ams: 141/81, 116/68, 133/77, 139/83, 151/88  Previous  episode   Adh to lisinopril 10, qD am  Denies HA, dizziness, vision changes, edema  ER precautions discussed    Dementia  Started Namenda in 12/2022; incr to 28mg 10/18/2023; no side effects   Worsening memory loss   Forgets relative names, locations of obj in house; recog faces   Has eloped in the past, no recent visits  No aud/visual halluciations, tremor, gait distrurbance  Mood swings improved slightly, no aggression/violence   Never unattended       [place] assessment:  No recent falls  Appetite is good  Sleep is good  Bowel and bladder 96% of the time, occasional urinary incontinence  ADLs/iADLS -   able to dress herself, transfer  needs assistance with   bathing, cleaning, cooking, finances  no longer driving  ambulates indep  Vision with glasses  No hearing aids   dentures  memory similar to previous  no behavioral issues recently  lives with son, Tommy, in [place],  of 15y     ROS   As above in HPI     Current Outpatient Medications   Medication Sig Dispense Refill    amoxicillin-clavulanate 875-125mg (AUGMENTIN) 875-125 mg per tablet Take 1 tablet by mouth every 12 (twelve) hours. (Patient not taking: Reported on 10/10/2023) 14 tablet 0    aspirin (ECOTRIN) 81 MG EC tablet Take 1 tablet (81 mg total) by mouth once daily. 90 tablet 1    lisinopriL 10 MG tablet Take 1 tablet (10 mg total) by mouth once daily. 90 tablet 3    memantine (NAMENDA XR) 28 mg CSpX Take 1 capsule (28 mg total) by mouth once daily. 30 capsule 2    polyethylene glycol (GLYCOLAX) 17 gram PwPk Take 17 g by mouth once daily.  0    pravastatin (PRAVACHOL) 40 MG tablet Take 1 tablet (40 mg total) by mouth once daily. 90 tablet 1    PROLIA 60 mg/mL Syrg Inject into skin of abdomen, upper arm, or upper thigh every 6 months 1 each 0     No current facility-administered medications for this visit.       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, stable    Hyperparathyroid  Hypercalcemia  Osteoporosis  Following with endocrine, s/p denosumab  Cont Prolia q6m    CBC grossly WNL 9/2023  Folate WNL 7/2021  B12 914 7/2021  BMP with Cl 109,  10/2023  Ion Ca 1.39 5/2023  Lipid WNL 1/2023  Vit D WNL 10/2023  A1C 5.7 1/2023  TSH WNL 1/2023, T4/T3 WNL    10/2023  Hep, HIV, RPR neg   Micro/Cr WNL 10/2021    MRI brain 2/2022: mod chronic microvasc ischemic changes, parenchymal vol loss  US ABD aorta 8/2023: stable in comparison to prior CT 5/2023  US soft tissue H/neck/thyroid 10/2023: normal thyroid w/BL benign appearing small nodules, no sign change    DEXA 2/2022: osteoporosis     RTC in 3m for dementia, HTN

## 2023-12-11 NOTE — PROGRESS NOTES
Date of Service: 10/18/2023    Attending Attestation: Patient discussed with resident. The chart was reviewed thoroughly including pertinent vitals, labs, imaging, medications, prior notes, and consultant/specialist recommendations.  I participated in the management of the patient, examined the patient, reviewed the summary of the plan, and was immediately available at all times throughout the encounter. Services were furnished in a primary care center located in the outpatient department of a [facility] hospital. I agree with the resident's findings and plan as documented in the resident's note.    Vidya Livingston MD  Attending - Family Medicine / Geriatric Medicine  [facility] [place], [facility]

## 2023-12-14 NOTE — PROGRESS NOTES
Date of Service: 11/1/2023    Attending Attestation: Patient discussed with geriatric fellow Dr. DARRYN Stewart . The chart was reviewed thoroughly including pertinent vitals, labs, imaging, medications, prior notes, and consultant/specialist recommendations.  I participated in the management of the patient, examined the patient, reviewed the summary of the plan, and was immediately available at all times throughout the encounter. Services were furnished in a primary care center located in the outpatient department of a teaching hospital. I agree with the fellow's findings and plan as documented in their note.    Vidya Livingston MD  Attending - Family Medicine / Geriatric Medicine  [facility] [place], [facility]

## 2024-01-11 ENCOUNTER — CLINICAL SUPPORT (OUTPATIENT)
Dept: UROLOGY | Facility: CLINIC | Age: 84
End: 2024-01-11
Payer: MEDICARE

## 2024-01-11 DIAGNOSIS — R82.90 BAD ODOR OF URINE: Primary | ICD-10-CM

## 2024-01-11 LAB
BILIRUB SERPL-MCNC: NORMAL MG/DL
BLOOD URINE, POC: NORMAL
COLOR, POC UA: YELLOW
GLUCOSE UR QL STRIP: NORMAL
KETONES UR QL STRIP: NORMAL
LEUKOCYTE ESTERASE URINE, POC: NORMAL
NITRITE, POC UA: NORMAL
PH, POC UA: 6
PROTEIN, POC: NORMAL
SPECIFIC GRAVITY, POC UA: 1.02
UROBILINOGEN, POC UA: 1

## 2024-01-11 PROCEDURE — 81001 URINALYSIS AUTO W/SCOPE: CPT | Mod: PBBFAC

## 2024-01-11 PROCEDURE — 99211 OFF/OP EST MAY X REQ PHY/QHP: CPT | Mod: S$PBB,,, | Performed by: UROLOGY

## 2024-01-11 PROCEDURE — 87086 URINE CULTURE/COLONY COUNT: CPT

## 2024-01-11 NOTE — PROGRESS NOTES
Patient presents for nurse visit for urinalysis.  Complaints of feeling symptoms of UTI and slight odor to the urine.  Midstream urine collected and sent for urinalysis.   notified.

## 2024-01-11 NOTE — PROGRESS NOTES
Patient comes in as a walk-in urine.  Daughter has noticed a foul odor to urine and decreased mental status.  She wonders if there is an infection.      Urinalysis:    Component 10:04   Color, UA Yellow   Spec Grav UA 1.025   pH, UA 6.0   WBC, UA trace   Nitrite, UA neg   Protein, POC neg   Glucose, UA neg   Ketones, UA neg   Urobilinogen, UA 1.0   Bilirubin, POC neg   Blood, UA neg        Microscopic:  3-4  squamous epithelial cells, 1-2 WBC, rare bacteria per HPF      Specimen Collected: 01/11/24 10:04 CST            Urine culture was sent.  We will await the urine culture for treatment.

## 2024-01-13 LAB — BACTERIA UR CULT: NORMAL

## 2024-01-14 ENCOUNTER — HOSPITAL ENCOUNTER (EMERGENCY)
Facility: HOSPITAL | Age: 84
Discharge: HOME | End: 2024-01-14
Attending: FAMILY MEDICINE
Payer: MEDICARE

## 2024-01-14 ENCOUNTER — TELEPHONE (OUTPATIENT)
Dept: UROLOGY | Facility: CLINIC | Age: 84
End: 2024-01-14
Payer: MEDICARE

## 2024-01-14 VITALS
WEIGHT: 128 LBS | HEIGHT: 68 IN | SYSTOLIC BLOOD PRESSURE: 117 MMHG | OXYGEN SATURATION: 96 % | DIASTOLIC BLOOD PRESSURE: 83 MMHG | HEART RATE: 84 BPM | TEMPERATURE: 98 F | BODY MASS INDEX: 19.4 KG/M2 | RESPIRATION RATE: 16 BRPM

## 2024-01-14 DIAGNOSIS — K57.32 DIVERTICULITIS LARGE INTESTINE W/O PERFORATION OR ABSCESS W/O BLEEDING: Primary | ICD-10-CM

## 2024-01-14 DIAGNOSIS — R10.13 EPIGASTRIC ABDOMINAL PAIN: ICD-10-CM

## 2024-01-14 LAB
ALBUMIN SERPL-MCNC: 3.7 G/DL (ref 3.4–4.8)
ALBUMIN/GLOB SERPL: 1.2 RATIO (ref 1.1–2)
ALP SERPL-CCNC: 81 UNIT/L (ref 40–150)
ALT SERPL-CCNC: 16 UNIT/L (ref 0–55)
APPEARANCE UR: CLEAR
AST SERPL-CCNC: 18 UNIT/L (ref 5–34)
BACTERIA #/AREA URNS AUTO: ABNORMAL /HPF
BASOPHILS # BLD AUTO: 0.04 X10(3)/MCL
BASOPHILS NFR BLD AUTO: 0.3 %
BILIRUB SERPL-MCNC: 0.7 MG/DL
BILIRUB UR QL STRIP.AUTO: NEGATIVE
BUN SERPL-MCNC: 21.7 MG/DL (ref 9.8–20.1)
CALCIUM SERPL-MCNC: 9.8 MG/DL (ref 8.4–10.2)
CASTS URNS MICRO: ABNORMAL /LPF
CHLORIDE SERPL-SCNC: 110 MMOL/L (ref 98–107)
CO2 SERPL-SCNC: 23 MMOL/L (ref 23–31)
COLOR UR AUTO: YELLOW
CREAT SERPL-MCNC: 0.89 MG/DL (ref 0.55–1.02)
EOSINOPHIL # BLD AUTO: 0.14 X10(3)/MCL (ref 0–0.9)
EOSINOPHIL NFR BLD AUTO: 1 %
ERYTHROCYTE [DISTWIDTH] IN BLOOD BY AUTOMATED COUNT: 13.2 % (ref 11.5–17)
GFR SERPLBLD CREATININE-BSD FMLA CKD-EPI: >60 MLS/MIN/1.73/M2
GLOBULIN SER-MCNC: 3.1 GM/DL (ref 2.4–3.5)
GLUCOSE SERPL-MCNC: 136 MG/DL (ref 82–115)
GLUCOSE UR QL STRIP.AUTO: NORMAL
HCT VFR BLD AUTO: 40.8 % (ref 37–47)
HGB BLD-MCNC: 13.8 G/DL (ref 12–16)
HYALINE CASTS #/AREA URNS LPF: ABNORMAL /LPF
IMM GRANULOCYTES # BLD AUTO: 0.08 X10(3)/MCL (ref 0–0.04)
IMM GRANULOCYTES NFR BLD AUTO: 0.6 %
KETONES UR QL STRIP.AUTO: NEGATIVE
LEUKOCYTE ESTERASE UR QL STRIP.AUTO: NEGATIVE
LIPASE SERPL-CCNC: 20 U/L
LYMPHOCYTES # BLD AUTO: 1.32 X10(3)/MCL (ref 0.6–4.6)
LYMPHOCYTES NFR BLD AUTO: 9.3 %
MAGNESIUM SERPL-MCNC: 1.7 MG/DL (ref 1.6–2.6)
MCH RBC QN AUTO: 32.1 PG (ref 27–31)
MCHC RBC AUTO-ENTMCNC: 33.8 G/DL (ref 33–36)
MCV RBC AUTO: 94.9 FL (ref 80–94)
MONOCYTES # BLD AUTO: 0.94 X10(3)/MCL (ref 0.1–1.3)
MONOCYTES NFR BLD AUTO: 6.6 %
MUCOUS THREADS URNS QL MICRO: ABNORMAL /LPF
NEUTROPHILS # BLD AUTO: 11.63 X10(3)/MCL (ref 2.1–9.2)
NEUTROPHILS NFR BLD AUTO: 82.2 %
NITRITE UR QL STRIP.AUTO: NEGATIVE
NRBC BLD AUTO-RTO: 0 %
PH UR STRIP.AUTO: 6.5 [PH]
PLATELET # BLD AUTO: 196 X10(3)/MCL (ref 130–400)
PMV BLD AUTO: 12.3 FL (ref 7.4–10.4)
POTASSIUM SERPL-SCNC: 4 MMOL/L (ref 3.5–5.1)
PROT SERPL-MCNC: 6.8 GM/DL (ref 5.8–7.6)
PROT UR QL STRIP.AUTO: ABNORMAL
RBC # BLD AUTO: 4.3 X10(6)/MCL (ref 4.2–5.4)
RBC #/AREA URNS AUTO: ABNORMAL /HPF
RBC UR QL AUTO: NEGATIVE
SODIUM SERPL-SCNC: 143 MMOL/L (ref 136–145)
SP GR UR STRIP.AUTO: 1.02 (ref 1–1.03)
SQUAMOUS #/AREA URNS LPF: ABNORMAL /HPF
UNIDENT CRYS #/AREA URNS HPF: ABNORMAL /HPF
UROBILINOGEN UR STRIP-ACNC: NORMAL
WBC # SPEC AUTO: 14.15 X10(3)/MCL (ref 4.5–11.5)
WBC #/AREA URNS AUTO: ABNORMAL /HPF

## 2024-01-14 PROCEDURE — 85025 COMPLETE CBC W/AUTO DIFF WBC: CPT | Performed by: FAMILY MEDICINE

## 2024-01-14 PROCEDURE — 99285 EMERGENCY DEPT VISIT HI MDM: CPT | Mod: 25

## 2024-01-14 PROCEDURE — 80053 COMPREHEN METABOLIC PANEL: CPT | Performed by: FAMILY MEDICINE

## 2024-01-14 PROCEDURE — 93005 ELECTROCARDIOGRAM TRACING: CPT

## 2024-01-14 PROCEDURE — 81001 URINALYSIS AUTO W/SCOPE: CPT | Performed by: FAMILY MEDICINE

## 2024-01-14 PROCEDURE — 83690 ASSAY OF LIPASE: CPT | Performed by: FAMILY MEDICINE

## 2024-01-14 PROCEDURE — 83735 ASSAY OF MAGNESIUM: CPT | Performed by: FAMILY MEDICINE

## 2024-01-14 PROCEDURE — 25500020 PHARM REV CODE 255

## 2024-01-14 RX ORDER — AMOXICILLIN AND CLAVULANATE POTASSIUM 875; 125 MG/1; MG/1
1 TABLET, FILM COATED ORAL 2 TIMES DAILY
Qty: 20 TABLET | Refills: 0 | Status: SHIPPED | OUTPATIENT
Start: 2024-01-14 | End: 2024-01-24

## 2024-01-14 RX ORDER — ONDANSETRON 4 MG/1
4 TABLET, ORALLY DISINTEGRATING ORAL EVERY 6 HOURS PRN
Qty: 10 TABLET | Refills: 0 | Status: SHIPPED | OUTPATIENT
Start: 2024-01-14 | End: 2024-01-19

## 2024-01-14 RX ORDER — DICYCLOMINE HYDROCHLORIDE 10 MG/1
10 CAPSULE ORAL EVERY 6 HOURS PRN
Qty: 20 CAPSULE | Refills: 0 | Status: SHIPPED | OUTPATIENT
Start: 2024-01-14 | End: 2024-01-24

## 2024-01-14 RX ADMIN — IOHEXOL 100 ML: 350 INJECTION, SOLUTION INTRAVENOUS at 03:01

## 2024-01-14 NOTE — ED PROVIDER NOTES
Encounter Date: 1/14/2024       History     Chief Complaint   Patient presents with    Abdominal Pain     Pt presents to ed with reports of midline upper abd pain with n/v/d starting late last night. Pt denies dysuria. Pt denies weakness/fatigue at present.      Patient is a 83-year-old female presents emergency room for evaluation with reports of diarrhea that began tonight.  Daughter currently with the patient.  Reports she was in her normal state of health yesterday.  Patient's mother has dementia, and has a sitter which stays with her.  Early this morning, patient had episode of diarrhea while in the bed, so daughter brought her to the emergency room for evaluation.  Patient reports that her appetite is good, and denies abdominal pain.  Reports feeling good otherwise.  Currently appears in no acute distress.    The history is provided by the patient.     Review of patient's allergies indicates:   Allergen Reactions    Propoxyphene     Tramadol Nausea And Vomiting     Past Medical History:   Diagnosis Date    HLD (hyperlipidemia)     HTN (hypertension)     Memory loss     Osteoporosis      Past Surgical History:   Procedure Laterality Date    hemorroidectomy      HYSTERECTOMY       Family History   Problem Relation Age of Onset    Anxiety disorder Father      Social History     Tobacco Use    Smoking status: Every Day     Current packs/day: 1.00     Average packs/day: 1 pack/day for 60.0 years (60.0 ttl pk-yrs)     Types: Cigarettes    Smokeless tobacco: Never    Tobacco comments:     Not ready to quit   Substance Use Topics    Alcohol use: Not Currently    Drug use: Never     Review of Systems   Constitutional:  Negative for chills, fatigue and fever.   HENT:  Negative for ear pain, rhinorrhea and sore throat.    Eyes:  Negative for photophobia and pain.   Respiratory:  Negative for cough, shortness of breath and wheezing.    Cardiovascular:  Negative for chest pain.   Gastrointestinal:  Positive for diarrhea.  Negative for abdominal pain, nausea and vomiting.   Genitourinary:  Negative for dysuria.   Neurological:  Negative for dizziness, weakness and headaches.   All other systems reviewed and are negative.      Physical Exam     Initial Vitals [01/14/24 0241]   BP Pulse Resp Temp SpO2   104/70 93 17 97.7 °F (36.5 °C) 99 %      MAP       --         Physical Exam    Nursing note and vitals reviewed.  Constitutional: She appears well-developed and well-nourished. No distress.   HENT:   Head: Normocephalic and atraumatic.   Eyes: Conjunctivae and EOM are normal. Pupils are equal, round, and reactive to light.   Neck: Neck supple.   Normal range of motion.  Cardiovascular:  Normal rate, regular rhythm and normal heart sounds.           Pulmonary/Chest: No respiratory distress. She has no wheezes. She has no rhonchi. She has no rales.   Abdominal: Abdomen is soft. Bowel sounds are normal. She exhibits no distension. There is abdominal tenderness.   Patient was moderate tenderness to palpation right lower quadrant, suprapubic.  Patient has mild tenderness to palpation left lower quadrant.  No rebound or guarding. There is no rebound and no guarding.   Musculoskeletal:         General: Normal range of motion.      Cervical back: Normal range of motion and neck supple.     Neurological: She is alert and oriented to person, place, and time.   Skin: Skin is warm and dry. Capillary refill takes less than 2 seconds. No erythema.   Psychiatric: She has a normal mood and affect. Her behavior is normal. Judgment and thought content normal.         ED Course   Procedures  Labs Reviewed   COMPREHENSIVE METABOLIC PANEL - Abnormal; Notable for the following components:       Result Value    Chloride 110 (*)     Glucose Level 136 (*)     Blood Urea Nitrogen 21.7 (*)     All other components within normal limits   URINALYSIS, REFLEX TO URINE CULTURE - Abnormal; Notable for the following components:    Protein, UA Trace (*)     Mucous, UA  Trace (*)     Unclassified Cast, UA 3-5 (*)     Unclassified Crystal, UA Trace (*)     All other components within normal limits   CBC WITH DIFFERENTIAL - Abnormal; Notable for the following components:    WBC 14.15 (*)     MCV 94.9 (*)     MCH 32.1 (*)     MPV 12.3 (*)     Neut # 11.63 (*)     IG# 0.08 (*)     All other components within normal limits   MAGNESIUM - Normal   LIPASE - Normal   CBC W/ AUTO DIFFERENTIAL    Narrative:     The following orders were created for panel order CBC Auto Differential.  Procedure                               Abnormality         Status                     ---------                               -----------         ------                     CBC with Differential[[id number]]       Abnormal            Final result                 Please view results for these tests on the individual orders.     EKG Readings: (Independently Interpreted)   My Independent EKG Interpretation  01/14/2024 3:12 AM  Rate: 78 bpm  Rhythm: Sinus  Axis: Normal  Intervals: Normal  ST Changes: Nonspecific  Impression: Normal sinus rhythm, with nonspecific ST changes           Imaging Results              CT Abdomen Pelvis With IV Contrast NO Oral Contrast (Preliminary result)  Result time 01/14/24 04:43:13      Preliminary result by Jeronimo Wadsworth MD (01/14/24 04:43:13)                   Narrative:    START OF REPORT:  Technique: CT of the abdomen and pelvis was performed with axial images as well as sagittal and coronal reconstruction images with intravenous contrast.    Comparison: Comparison is with study dated 2023-05-18 17:51:06.    Clinical History: RLQ abdominal pain.    Dosage Information: Automated Exposure Control was utilized 228.99 mGy.cm.    Findings:  Lines and Tubes: None.  Thorax:  Lungs: Hazy and linear opacity is present at the visualized lung bases, consistent with nonspecific dependent changes scarring and atelectasis. No focal infiltrate or consolidation is seen.  Pleura: No effusions or  thickening.  Heart: The heart size is within normal limits.  Abdomen:  Abdominal Wall: No abdominal wall pathology is seen.  Liver: The liver appears unremarkable.  Biliary System: No intrahepatic or extrahepatic biliary duct dilatation is seen.  Gallbladder: The gallbladder appears unremarkable.  Pancreas: Mild pancreatic atrophy is seen.  Spleen: The spleen appears unremarkable.  Adrenals: The adrenal glands appear unremarkable.  Kidneys: The right kidney appears unremarkable with no stones cysts masses or hydronephrosis. The left kidney appears unremarkable with no stones cysts masses or hydronephrosis.  Aorta: There is moderate calcification of the abdominal aorta and its branches. There is a stable appearing fusiform aneurysm of the infrarenal abdominal aorta measuring3.7 x 4.5 x 4.3 cm (AP x T x CC, Image 49 series 2). There is no evidence of dissection.  IVC: Unremarkable.  Bowel:  Esophagus: The visualized esophagus appears unremarkable. There is a small hiatal hernia.  Stomach: The stomach appears unremarkable.  Duodenum: Unremarkable appearing duodenum.  Small Bowel: The small bowel appears unremarkable.  Colon: Multiple diverticula are again seen in the cecum hepatic flexure splenic flexure and descending through to the sigmoid colon. No associated inflammatory stranding is seen to suggest diverticulitis.  Appendix: The appendix is not identified but no inflammatory changes are seen in the right lower quadrant to suggest appendicitis.    Pelvis:  Bladder: The bladder is nondistended but appears otherwise unremarkable.  Male:  Prostate gland: The prostate gland appears unremarkable.  Inguinal Findings:  Inguinal Hernia: Incidental note is made of small uncomplicated mesenteric fat containing bilateral inguinal hernias.    Bony structures:  Dorsal Spine: There is moderate spondylosis of the visualized dorsal spine.  Bony Pelvis: The visualized bony structures of the pelvis appear  unremarkable.      Impression:  1. No acute intraabdominal or pelvic solid organ or bowel pathology identified. Details and other findings as discussed above.                                         Medications   iohexoL (OMNIPAQUE 350) 350 mg iodine/mL injection (100 mLs Intravenous Given 1/14/24 0345)     Medical Decision Making  Patient is an 83-year-old female presents emergency room with 1 episode of diarrhea, currently appears well in no distress, physical examination noted to have moderate tenderness to palpation in the right lower quadrant especially, but also some suprapubic and very mild left lower quadrant abdominal pain.  Will obtain laboratory evaluation including CBC CMP and urinalysis.  Due to the abdominal pain, will obtain a CT scan of the abdomen pelvis for further evaluation.      Differential diagnosis:  Acute cystitis, colitis, diverticulitis, appendicitis, nonspecific abdominal pain, gastroenteritis    Amount and/or Complexity of Data Reviewed  Labs: ordered.  Radiology: ordered.    Risk  Prescription drug management.               ED Course as of 01/14/24 0519   Sun Jan 14, 2024   0517 Patient currently feeling improved nontoxic appearing.  Stable for discharge to home.  Discussed with patient's daughter.  Will place on Augmentin for diverticulitis.  ER precautions for any acute worsening. [MW]      ED Course User Index  [MW] Zachariah Osborn MD                           Clinical Impression:  Final diagnoses:  [R10.13] Epigastric abdominal pain  [K57.32] Diverticulitis large intestine w/o perforation or abscess w/o bleeding (Primary)          ED Disposition Condition    Discharge Stable          ED Prescriptions       Medication Sig Dispense Start Date End Date Auth. Provider    amoxicillin-clavulanate 875-125mg (AUGMENTIN) 875-125 mg per tablet Take 1 tablet by mouth 2 (two) times daily. for 10 days 20 tablet 1/14/2024 1/24/2024 Zachariah Osborn MD    ondansetron (ZOFRAN-ODT) 4 MG  TbDL Take 1 tablet (4 mg total) by mouth every 6 (six) hours as needed (nausea vomiting). 10 tablet 1/14/2024 1/19/2024 Zachariah Osborn MD    dicyclomine (BENTYL) 10 MG capsule Take 1 capsule (10 mg total) by mouth every 6 (six) hours as needed (abdominal cramping). 20 capsule 1/14/2024 1/24/2024 Zachariah Osborn MD          Follow-up Information       Follow up With Specialties Details Why Contact Info    [facility] Emergency Medicine  As needed, If symptoms worsen [address] [facility] [address] [phone]    Primary Care Physician  In 5 days               Zachariah Osborn MD  01/14/24 0555

## 2024-01-16 DIAGNOSIS — F03.90 DEMENTIA, UNSPECIFIED DEMENTIA SEVERITY, UNSPECIFIED DEMENTIA TYPE, UNSPECIFIED WHETHER BEHAVIORAL, PSYCHOTIC, OR MOOD DISTURBANCE OR ANXIETY: ICD-10-CM

## 2024-01-16 RX ORDER — MEMANTINE HYDROCHLORIDE 28 MG/1
1 CAPSULE, EXTENDED RELEASE ORAL DAILY
Qty: 90 CAPSULE | Refills: 2 | Status: CANCELLED | OUTPATIENT
Start: 2024-01-16

## 2024-02-15 DIAGNOSIS — E78.5 HYPERLIPIDEMIA, UNSPECIFIED HYPERLIPIDEMIA TYPE: ICD-10-CM

## 2024-02-15 RX ORDER — PRAVASTATIN SODIUM 40 MG/1
40 TABLET ORAL DAILY
Qty: 90 TABLET | Refills: 1 | Status: SHIPPED | OUTPATIENT
Start: 2024-02-15 | End: 2024-04-22 | Stop reason: SDUPTHER

## 2024-02-29 ENCOUNTER — OFFICE VISIT (OUTPATIENT)
Dept: FAMILY MEDICINE | Facility: CLINIC | Age: 84
End: 2024-02-29
Payer: MEDICARE

## 2024-02-29 VITALS
DIASTOLIC BLOOD PRESSURE: 80 MMHG | RESPIRATION RATE: 20 BRPM | HEIGHT: 68 IN | BODY MASS INDEX: 21.92 KG/M2 | WEIGHT: 144.63 LBS | OXYGEN SATURATION: 96 % | SYSTOLIC BLOOD PRESSURE: 125 MMHG | TEMPERATURE: 98 F | HEART RATE: 79 BPM

## 2024-02-29 DIAGNOSIS — F01.50 MIXED VASCULAR AND NEURODEGENERATIVE DEMENTIA WITHOUT BEHAVIORAL DISTURBANCE: Primary | ICD-10-CM

## 2024-02-29 DIAGNOSIS — E21.0 PRIMARY HYPERPARATHYROIDISM: ICD-10-CM

## 2024-02-29 PROBLEM — I71.40 ABDOMINAL AORTIC ANEURYSM (AAA) WITHOUT RUPTURE, UNSPECIFIED PART: Status: ACTIVE | Noted: 2024-02-29

## 2024-02-29 PROCEDURE — 99214 OFFICE O/P EST MOD 30 MIN: CPT | Mod: PBBFAC

## 2024-02-29 NOTE — PROGRESS NOTES
[facility]  [facility] Note    DOS: 2/29/2024      Subjective:  Chief Complaint:    Chief Complaint   Patient presents with    Follow-up    Memory Loss       History of Present Illness:  Guillermina Stewart is a 83 y.o. female with a PMH of dementia, hypercalcemia, hyperparathyroid, HLD, HTN, osteoporosis, tobacco user. Last seen 10/18/23  PCP Maurizio  Here w/ Daughter, Leila is primary caretaker     Dementia  Started Namenda in 12/2022; incr to 28mg 10/18/2023; no side effects   Memory has been relatively stable, some gradual worsening    Forgets relatives and daughter names, locations of obj in house; recog faces   Has some issues w/ elopements every 2-3 months, daughter has alarms on the house and doors locks, she has never gotten outside   No aud/visual halluciations, tremor, gait distrurbance  No other behavioral symptoms   Never unattended   MRI brain 2022, moderate chronic ischemic changes and global atrophy     Nidhi assessment:  No recent falls  Appetite is good, she has gained some weight recently   Sleep is good  No bladder or bowel incontinence, no significant nocturia   ADLs/iADLS -   able to dress herself, transfer  needs assistance with   bathing, cleaning, cooking, finances  no longer driving  ambulates indep  Vision with glasses  No hearing aids   dentures  lives with son, Tommy, in [place],  of 15y     Past Medical History:   Diagnosis Date    HLD (hyperlipidemia)     HTN (hypertension)     Memory loss     Osteoporosis       Past Surgical History:   Procedure Laterality Date    hemorroidectomy      HYSTERECTOMY        Family History   Problem Relation Age of Onset    Anxiety disorder Father       Social History     Socioeconomic History    Marital status:     Number of children: 3   Occupational History    Occupation: retired   Tobacco Use    Smoking status: Every Day     Current packs/day: 1.00     Average packs/day: 1 pack/day for 60.0 years  (60.0 ttl pk-yrs)     Types: Cigarettes    Smokeless tobacco: Never    Tobacco comments:     Not ready to quit   Substance and Sexual Activity    Alcohol use: Not Currently    Drug use: Never    Sexual activity: Not Currently     Partners: Female     Social Determinants of Health     Financial Resource Strain: Low Risk  (5/19/2023)    Overall Financial Resource Strain (CARDIA)     Difficulty of Paying Living Expenses: Not hard at all   Food Insecurity: No Food Insecurity (5/19/2023)    Hunger Vital Sign     Worried About Running Out of Food in the Last Year: Never true     Ran Out of Food in the Last Year: Never true   Transportation Needs: No Transportation Needs (6/23/2022)    PRAPARE - Transportation     Lack of Transportation (Medical): No     Lack of Transportation (Non-Medical): No   Physical Activity: Inactive (5/19/2023)    Exercise Vital Sign     Days of Exercise per Week: 0 days     Minutes of Exercise per Session: 0 min   Stress: No Stress Concern Present (5/19/2023)    [employer] [place] [employer] - Occupational Stress Questionnaire     Feeling of Stress : Not at all   Social Connections: Socially Isolated (5/19/2023)    Social Connection and Isolation Panel [NHANES]     Frequency of Communication with Friends and Family: More than three times a week     Frequency of Social Gatherings with Friends and Family: More than three times a week     Attends [employer] Services: Never     Active Member of Clubs or Organizations: No     Attends Club or Organization Meetings: Never     Marital Status:    Housing Stability: Low Risk  (5/19/2023)    Housing Stability Vital Sign     Unable to Pay for Housing in the Last Year: No     Number of Places Lived in the Last Year: 1     Unstable Housing in the Last Year: No      Health Maintenance Reviewed:    There is no immunization history on file for this patient.       Review of patient's allergies indicates:   Allergen Reactions    Propoxyphene      "Tramadol Nausea And Vomiting        Current Outpatient Medications:     aspirin (ECOTRIN) 81 MG EC tablet, Take 1 tablet (81 mg total) by mouth once daily., Disp: 90 tablet, Rfl: 1    lisinopriL 10 MG tablet, Take 1 tablet (10 mg total) by mouth once daily., Disp: 90 tablet, Rfl: 3    memantine (NAMENDA XR) 28 mg CSpX, Take 1 capsule (28 mg total) by mouth once daily., Disp: 30 capsule, Rfl: 2    polyethylene glycol (GLYCOLAX) 17 gram PwPk, Take 17 g by mouth once daily., Disp: , Rfl: 0    pravastatin (PRAVACHOL) 40 MG tablet, Take 1 tablet (40 mg total) by mouth once daily., Disp: 90 tablet, Rfl: 1    PROLIA 60 mg/mL Syrg, Inject into skin of abdomen, upper arm, or upper thigh every 6 months, Disp: 1 each, Rfl: 0     Review of Systems   Constitutional:  Negative for fever.   Respiratory:  Negative for shortness of breath.    Cardiovascular:  Negative for palpitations and leg swelling.   Gastrointestinal:  Negative for abdominal pain and diarrhea.   Genitourinary:  Negative for dysuria.        Objective:   Vitals:    02/29/24 1358   BP: 125/80   BP Location: Left arm   Patient Position: Sitting   BP Method: Large (Automatic)   Pulse: 79   Resp: 20   Temp: 98.2 °F (36.8 °C)   TempSrc: Oral   SpO2: 96%   Weight: 65.6 kg (144 lb 9.6 oz)   Height: 5' 7.99" (1.727 m)        Physical Exam  Constitutional:       Comments: Pleasant elderly female, intermittent confusion, answers most questions appropriately    Eyes:      Conjunctiva/sclera: Conjunctivae normal.   Cardiovascular:      Rate and Rhythm: Normal rate and regular rhythm.      Heart sounds: No murmur heard.     No friction rub. No gallop.   Pulmonary:      Effort: Pulmonary effort is normal.      Breath sounds: No wheezing or rales.      Comments: Decreased breath sounds bilaterally, diffuse mild expiratory rhonchi   Abdominal:      General: Abdomen is flat. Bowel sounds are normal. There is no distension.      Palpations: Abdomen is soft.      Tenderness: There " is no abdominal tenderness. There is no guarding.   Musculoskeletal:      Right lower leg: No edema.      Left lower leg: No edema.   Skin:     General: Skin is warm.   Neurological:      General: No focal deficit present.      Mental Status: She is oriented to person, place, and time.      Comments: Strength 5/5 in upper/lower extremity, no tremor, no rigidity         Cognitive Assessment:  SLUMS performed date: 10/18/23 and scanned to patient's chart in media, Score 2/30    Depression Assessment:       2/29/2024     1:57 PM 11/1/2023     9:05 AM 10/18/2023     9:37 AM 10/18/2023     8:20 AM 10/10/2023     8:06 AM 8/4/2023     8:08 AM 3/30/2023     1:35 PM   Depression Patient Health Questionnaire   Over the last two weeks how often have you been bothered by little interest or pleasure in doing things Not at all Not at all Not at all Not at all Not at all Not at all Not at all   Over the last two weeks how often have you been bothered by feeling down, depressed or hopeless Not at all Not at all Not at all Not at all Not at all Not at all Not at all   PHQ-2 Total Score 0 0 0 0 0 0 0     Recent labs:  CBC:  Lab Results   Component Value Date    WBC 14.15 (H) 01/14/2024    RBC 4.30 01/14/2024    HGB 13.8 01/14/2024    HCT 40.8 01/14/2024    MCV 94.9 (H) 01/14/2024    MCH 32.1 (H) 01/14/2024    MCHC 33.8 01/14/2024    RDW 13.2 01/14/2024     01/14/2024    MPV 12.3 (H) 01/14/2024      CMP:  Sodium Level   Date Value Ref Range Status   01/14/2024 143 136 - 145 mmol/L Final     Potassium Level   Date Value Ref Range Status   01/14/2024 4.0 3.5 - 5.1 mmol/L Final     Carbon Dioxide   Date Value Ref Range Status   01/14/2024 23 23 - 31 mmol/L Final     Blood Urea Nitrogen   Date Value Ref Range Status   01/14/2024 21.7 (H) 9.8 - 20.1 mg/dL Final     Creatinine   Date Value Ref Range Status   01/14/2024 0.89 0.55 - 1.02 mg/dL Final     Calcium Level Total   Date Value Ref Range Status   01/14/2024 9.8 8.4 - 10.2 mg/dL  "Final     Albumin Level   Date Value Ref Range Status   01/14/2024 3.7 3.4 - 4.8 g/dL Final     Bilirubin Total   Date Value Ref Range Status   01/14/2024 0.7 <=1.5 mg/dL Final     Alkaline Phosphatase   Date Value Ref Range Status   01/14/2024 81 40 - 150 unit/L Final     Aspartate Aminotransferase   Date Value Ref Range Status   01/14/2024 18 5 - 34 unit/L Final     Alanine Aminotransferase   Date Value Ref Range Status   01/14/2024 16 0 - 55 unit/L Final     Estimated GFR-Non    Date Value Ref Range Status   06/23/2022 >60 mls/min/1.73/m2 Final      BMP:  Lab Results   Component Value Date     01/14/2024    K 4.0 01/14/2024    CO2 23 01/14/2024    BUN 21.7 (H) 01/14/2024    CREATININE 0.89 01/14/2024    CALCIUM 9.8 01/14/2024    EGFRNONAA >60 06/23/2022      Lipid Panel:  Lab Results   Component Value Date    CHOL 194 01/30/2023    CHOL 168 10/10/2022    CHOL 175 06/23/2022     Lab Results   Component Value Date    HDL 54 01/30/2023    HDL 46 10/10/2022    HDL 39 06/23/2022     No results found for: "LDLCALC"  Lab Results   Component Value Date    TRIG 120 01/30/2023    TRIG 120 10/10/2022    TRIG 246 (H) 06/23/2022     No results found for: "CHOLHDL"   HbA1c:  Lab Results   Component Value Date    HGBA1C 5.7 01/30/2023      TSH:  Lab Results   Component Value Date    TSH 1.336 01/30/2023       Recent Imaging:  CT Abdomen Pelvis With IV Contrast NO Oral Contrast  Narrative: EXAMINATION:  CT ABDOMEN PELVIS WITH IV CONTRAST    CLINICAL HISTORY:  RLQ abdominal pain (Age >= 14y);    TECHNIQUE:  Low dose axial images, sagittal and coronal reformations were obtained from the lung bases to the pubic symphysis following the IV administration of contrast. Automatic exposure control (AEC) is utilized to reduce patient radiation exposure.    COMPARISON:  05/18/2023    FINDINGS:  There is mild bibasilar atelectasis in the lungs    There is a small hiatal hernia    The liver appears normal.  No liver " mass or lesion is seen.  Portal and hepatic veins appear normal.    The gallbladder appears normal.  No obvious gallstones are seen.  No biliary dilatation is seen.  No pericholecystic fluid is seen.    The pancreas appears normal.  No pancreatic mass or lesion is seen.    The spleen shows no acute abnormality.    The adrenal glands appear normal.  No adrenal nodule is seen.    The kidneys appear normal.  No hydronephrosis is seen.  No hydroureter is seen.  No nephrolithiasis is seen.  No obvious ureteral stones are seen.    Urinary bladder appears grossly unremarkable.    There is an abdominal aortic aneurysm seen that measures 3.7 x 4.5 x 4.3 cm.  It is similar to the prior examination.    No colitis is seen.  No diverticulitis is seen.  No obvious colonic mass or lesion is seen.    No free air is seen.  No free fluid is seen.  Impression: Stable abdominal aortic aneurysm    Small hiatal hernia    There is slight discrepancy with the preliminary report.  Preliminary report described the patient as a male but this is a female patient.  Otherwise there is general concurrence with the preliminary report.    Electronically signed by: Bj Jacobs  Date:    01/14/2024  Time:    08:39       Assessment & Plan:    Guillermina Stewart is presenting as above and will be treated as follows:    1. 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 HTN at goal. Discussed smoking cessation   - Continue routine precautions   - We will see her again in 6 months or sooner if new symptoms arise  - Rest of care per primary     RTC 6 months

## 2024-02-29 NOTE — PROGRESS NOTES
Subjective:       Patient ID: Guillermina Stewart is a 83 y.o. female.    Chief Complaint: No chief complaint on file.    HPI   80yo female PMH osteoporosis, HTN, depression, hyperparathyroidism, hypercalcemia, HLD, memory deficit, tobacco use. Presents to the [facility] for follow up for forgetfulness.     Patient was started on Namenda and has been taking assist December, patient's daughter says that she has not noticed any difference.  Patient is able to dress herself, however she requires help with all her other ADLs.  Patient requires help with bathing, and obtaining food.  Patient's spells are taking care of by patient's daughter.  Patient had an incident where she was left alone and she wandered outside and it was hot.  Patient does not drive.  Since then patient is been with her family who watches over.  Furthermore patient had incident where she was hypotensive and dizzy last month her blood pressure at that time was 180 systolic and required clonidine.  Patient has been on a lower dose of lisinopril for the past 3 months.  When asking patient she is only alert to her person, she does not know her age, where her location is, or the time or the date.  Patient denies any chest pain, palpitations, shortness of breath, wheezing.  Patient's memory issues are still an issue.     Memory deficit:    - two different daughters raised concern over forgetfullness of relatives' names, location of things around her house; pt does not feel she has memory issues but does admit to forgetting where she places things, daughter at today's visit says forgetfulness worsening  - patient not oriented to birthdate or current year  - pt and daughter denied visual/auditory hallucinations, tremors, or gait disturbances  - reported mood swings by daughter, not as bad as before  -  passes away 15y ago; lives in [place] with her son  -  B12 elevated at 914, Folate WNL, Syphilis/HIV/Hep panel negative, LATOYA negative, RF IgA 1 IgG  3 IgM 0 , ESR 13 WNL, CRP 0.336 WNL, all 7/2021  - no side effects of donepezil 10mg qD    US thyroid 8/2021:   IMPRESSION  - Multiple bilateral thyroid nodules, none of which meet biopsy criteria secondary to size below threshold. Annual surveillance thyroid ultrasound recommended for the subcentimeter TI-RADS 5 nodules.  - Hypoechoic posterior right nodule may be extrathyroidal and could represent parathyroid gland, otherwise relatively indistinct and limited characterization. Additional assessment with nuclear medicine imaging and/or parathyroid 4D protocol CT recommended  - pt refused further work-up of of thyroid and parathyroidism; PCP doubts her decision-making capacity considering memory and mood issues. Has not followed up with Endocrinology regarding her hyperparathyroidism; per daughter visit is rescheduled in 8/2022      MRI Brain 2/2022:   - no restricted diffusion, hemorrhage or edema moderate scattered and patchy T2/FLAIR hyperintensities in the subcortical and periventricular white matter, with prior lacunar infarcts in the basal ganglia, thalami and leland.  - no mass effect or midline shift  - moderate parenchymal volume loss   - basal cisterns are patent  - no hydrocephalus or abnormal extra-axial fluid collection  - major intracranial flow voids are patent  - fluid layering in the left maxillary sinus.  IMPRESSION:  1.  No acute intracranial abnormality identified.  2.  Moderate chronic microvascular ischemic changes and parenchymal  volume loss.    SLUMS 5/30, pos screen for dementia as of 6/2022  Education level: 8th grade    Nidhi assessment:   No recent falls  Appetite is good  Sleep is good  Bowel/bladder normal and has no complaints  ADLs/iADLS - able to dress herself, needs assistance with bathing, independent of transferring, cleaning - son cooks but she is able to do so for herself, daughter manages finances but always has   No driving issues, no wrecks or tickets  Ambulates without  issue  Vision is good  Memory similar to previous (daughter present for entire exam),   Pleasant mood at times of visit, daughter reports mood swings at times  Lives with sonJacob      Medications:   Centrum MV   Amlodipine 5mg qD   Prolia q6m   Donepezil 10mg   Lisinopril 20mg qD  Pravastatin 40mg qD     Review of Systems    Denies HA, dizziness, vision changes, edema, chest pain/congestion, palpitations, GI/ issues  Objective:      Physical Exam    There were no vitals taken for this visit.    General: no acute distress, accompanied by daughter   CVS: regular rate, 2+ radial pulses bilaterally, no edema  Resp: effort normal, no respiratory distress on room air  GI: soft, non-tender, non-distended  Psych: appropriate and cooperative        Assessment:       1. Abdominal aortic aneurysm (AAA) without rupture, unspecified part    2. Dementia, unspecified dementia severity, unspecified dementia type, unspecified whether behavioral, psychotic, or mood disturbance or anxiety    3. Primary hyperparathyroidism          Plan:   Dementia, slums today is approximately 2/30.    -patient has been on Namenda, increase dosage to 28    Primary hyperparathyroidism:   -patient follows up with endocrinology, patient received a shot of denosumab.  -Patient has BMP, urine calcium and urine creatinine pending for this month.      Hypertension:   -patient had ED visit where her blood pressure was 180 systolic.  Patient is currently on lisinopril 5 mg.  Patient's blood pressure is currently 131/79.  May up titrate lisinopril and have patient check her blood pressure with her family and come back for follow-up visit.  -patient has a infra abdominal aortic aneurysm that measures proximally 3.9 cm.  Imperative for blood pressure control.      Telephone visit in 2 weeks with BP logs    Jennie Weldon MD  Internal Medicine Resident PGY-III  [facility]  02/29/2024 2/29/2024

## 2024-04-18 ENCOUNTER — LAB VISIT (OUTPATIENT)
Dept: LAB | Facility: HOSPITAL | Age: 84
End: 2024-04-18
Attending: STUDENT IN AN ORGANIZED HEALTH CARE EDUCATION/TRAINING PROGRAM
Payer: MEDICARE

## 2024-04-18 ENCOUNTER — OFFICE VISIT (OUTPATIENT)
Dept: OTOLARYNGOLOGY | Facility: CLINIC | Age: 84
End: 2024-04-18
Payer: MEDICARE

## 2024-04-18 VITALS
HEART RATE: 60 BPM | OXYGEN SATURATION: 97 % | BODY MASS INDEX: 22.29 KG/M2 | SYSTOLIC BLOOD PRESSURE: 135 MMHG | RESPIRATION RATE: 18 BRPM | HEIGHT: 67 IN | DIASTOLIC BLOOD PRESSURE: 77 MMHG | WEIGHT: 142 LBS

## 2024-04-18 DIAGNOSIS — E21.0 PRIMARY HYPERPARATHYROIDISM: ICD-10-CM

## 2024-04-18 DIAGNOSIS — E21.0 PRIMARY HYPERPARATHYROIDISM: Primary | ICD-10-CM

## 2024-04-18 LAB
DEPRECATED CALCIDIOL+CALCIFEROL SERPL-MC: 39.7 NG/ML (ref 30–80)
PTH-INTACT SERPL-MCNC: 130.6 PG/ML (ref 8.7–77)

## 2024-04-18 PROCEDURE — 99213 OFFICE O/P EST LOW 20 MIN: CPT | Mod: PBBFAC | Performed by: STUDENT IN AN ORGANIZED HEALTH CARE EDUCATION/TRAINING PROGRAM

## 2024-04-18 PROCEDURE — 82306 VITAMIN D 25 HYDROXY: CPT

## 2024-04-18 PROCEDURE — 83970 ASSAY OF PARATHORMONE: CPT

## 2024-04-18 PROCEDURE — 36415 COLL VENOUS BLD VENIPUNCTURE: CPT

## 2024-04-18 NOTE — PROGRESS NOTES
[facility]  Otolaryngology Clinic Note    Guillermina Stewart  Encounter Date: 4/18/2024  YOB: 1940  Physician: POLLY Babin MD    Chief Complaint:  Parathyroid adenoma    HPI: Guillermina Stewart is a 83 y.o. female with a past medical history of osteoporosis, dementia, hyperlipidemia, hypertension who presents as a referral from endocrinology for parathyroid adenoma.  Patient underwent CT scan 1 year ago which showed right parathyroid adenoma measuring 7 mm.  Patient also had a history of increased PTH with increased calcium levels.  Patient has been getting Prolia shots for osteoporosis.  Daughter is at bedside and is the patient's primary caregiver.  Daughter notes that patient has not had any kidney stones, constipation, pain, nausea decreased appetite.  Patient does endorse increased urination at night.   Per chart review patient also had a thyroid nodule that is 1 cm in dimension.  Patient has not gotten a follow-up imaging  Patient has never undergone having neck surgery previously.  Patient has a 60 pack-year smoking history.  Denies alcohol use.    4/18/2024:  Presents today in follow up for her hyperparathyroidism.  No new changes.  At baseline level of health.  Denies kidney stones, constipation, pain, nausea, decreased appetite.    ROS:   General: Negative except per HPI  Skin: Denies rash, ulcer, or lesion.  Eyes: Denies vision changes or diplopia.  Ears: Negative except per HPI  Nose: Negative except per HPI  Throat/mouth: Negative except per HPI  Cardiovascular: Negative except per HPI  Respiratory: Negative except per HPI  Neck: Negative except per HPI  Endocrine: Negative except per HPI  Neurologic: Negative except per HPI    Other 10-point review of systems negative except per HPI      Review of patient's allergies indicates:   Allergen Reactions    Propoxyphene     Tramadol Nausea And Vomiting       Past Medical History:   Diagnosis Date    HLD (hyperlipidemia)      HTN (hypertension)     Memory loss     Osteoporosis        Past Surgical History:   Procedure Laterality Date    hemorroidectomy      HYSTERECTOMY         Social History     Socioeconomic History    Marital status:     Number of children: 3   Occupational History    Occupation: retired   Tobacco Use    Smoking status: Every Day     Current packs/day: 1.00     Average packs/day: 1 pack/day for 60.0 years (60.0 ttl pk-yrs)     Types: Cigarettes    Smokeless tobacco: Never    Tobacco comments:     Not ready to quit   Substance and Sexual Activity    Alcohol use: Not Currently    Drug use: Never    Sexual activity: Not Currently     Partners: Female     Social Determinants of Health     Financial Resource Strain: Low Risk  (5/19/2023)    Overall Financial Resource Strain (CARDIA)     Difficulty of Paying Living Expenses: Not hard at all   Food Insecurity: No Food Insecurity (5/19/2023)    Hunger Vital Sign     Worried About Running Out of Food in the Last Year: Never true     Ran Out of Food in the Last Year: Never true   Transportation Needs: No Transportation Needs (6/23/2022)    PRAPARE - Transportation     Lack of Transportation (Medical): No     Lack of Transportation (Non-Medical): No   Physical Activity: Inactive (5/19/2023)    Exercise Vital Sign     Days of Exercise per Week: 0 days     Minutes of Exercise per Session: 0 min   Stress: No Stress Concern Present (5/19/2023)    [place] of Occupational Health - Occupational Stress Questionnaire     Feeling of Stress : Not at all   Social Connections: Socially Isolated (5/19/2023)    Social Connection and Isolation Panel [NHANES]     Frequency of Communication with Friends and Family: More than three times a week     Frequency of Social Gatherings with Friends and Family: More than three times a week     Attends Sabianism Services: Never     Active Member of Clubs or Organizations: No     Attends Club or Organization Meetings: Never     Marital  "Status:    Housing Stability: Low Risk  (5/19/2023)    Housing Stability Vital Sign     Unable to Pay for Housing in the Last Year: No     Number of Places Lived in the Last Year: 1     Unstable Housing in the Last Year: No       Family History   Problem Relation Name Age of Onset    Anxiety disorder Father Tiago Langford        Outpatient Encounter Medications as of 4/18/2024   Medication Sig Dispense Refill    aspirin (ECOTRIN) 81 MG EC tablet Take 1 tablet (81 mg total) by mouth once daily. 90 tablet 1    lisinopriL 10 MG tablet Take 1 tablet (10 mg total) by mouth once daily. 90 tablet 3    memantine (NAMENDA XR) 28 mg CSpX Take 1 capsule (28 mg total) by mouth once daily. 30 capsule 2    pravastatin (PRAVACHOL) 40 MG tablet Take 1 tablet (40 mg total) by mouth once daily. 90 tablet 1    PROLIA 60 mg/mL Syrg Inject into skin of abdomen, upper arm, or upper thigh every 6 months 1 each 0    polyethylene glycol (GLYCOLAX) 17 gram PwPk Take 17 g by mouth once daily. (Patient not taking: Reported on 4/18/2024)  0     No facility-administered encounter medications on file as of 4/18/2024.       Physical Exam:  Vitals:    04/18/24 1318   BP: 135/77   BP Location: Right arm   Patient Position: Sitting   BP Method: Small (Automatic)   Pulse: 60   Resp: 18   SpO2: 97%   Weight: 64.4 kg (142 lb)   Height: 5' 7" (1.702 m)       Constitutional  General Appearance: well nourished, well-developed, able to respond to some questions however is not a reliable   HEENT  Eyes: PEERLA, EOMI, normal conjunctivae  Ears: Hearing well at conversation level;  Nose: septum midline, no inferior turbinate hypertrophy, no polyps, moist mucosa without erythema or blue hue  OC/OP:  Upper and lower dentures, no oral lesions, tonsils are symmetric   Nasopharynx, Hypopharynx, and Larynx:    Indirect: attempted, limited view due to patient intolerance  Neck: soft, non-tender, no palpable lymph nodes   Thyroid region- no nodules or " goiter  Neuro: CN II - XII intact bilaterally  Cardiovascular: peripheral pulses palpable  Respiratory: non-labored respirations  Psychiatric:  Has dementia    Pertinent Data:  ? LABS:  Component Ref Range & Units 3 mo ago  (1/14/24) 6 mo ago  (10/2/23) 7 mo ago  (9/2/23) 8 mo ago  (8/8/23) 8 mo ago  (8/4/23) 11 mo ago  (5/24/23) 11 mo ago  (5/23/23)   Sodium Level 136 - 145 mmol/L 143 144  141 144 142 139   Potassium Level 3.5 - 5.1 mmol/L 4.0 3.6 CM 3.6 3.7 4.1 4.4 4.6   Chloride 98 - 107 mmol/L 110 High  109 High   High  110 High  113 High  108 High  106   Carbon Dioxide 23 - 31 mmol/L 23 27 CM 21 Low  22 Low  24 26 24   Glucose Level 82 - 115 mg/dL 136 High  130 High   High  171 High  93 107 90   Blood Urea Nitrogen 9.8 - 20.1 mg/dL 21.7 High  15.7 CM 15.5 12.6 11.7 20.8 High  18.9   Creatinine 0.55 - 1.02 mg/dL 0.89 0.74 CM 0.67 0.80 0.74 0.87 0.78   Calcium Level Total 8.4 - 10.2 mg/dL 9.8 9.4 CM 9.0 9.1 9.9 10.8 High  10.5 High    Protein Total 5.8 - 7.6 gm/dL 6.8  7.1  7.4     Albumin Level 3.4 - 4.8 g/dL 3.7  3.8 3.7 4.0     Globulin 2.4 - 3.5 gm/dL 3.1  3.3  3.4     Albumin/Globulin Ratio 1.1 - 2.0 ratio 1.2  1.2  1.2     Bilirubin Total <=1.5 mg/dL 0.7  0.6  0.4     Alkaline Phosphatase 40 - 150 unit/L 81  67  114     Alanine Aminotransferase 0 - 55 unit/L 16  15  13     Aspartate Aminotransferase 5 - 34 unit/L 18  21  21     eGFR mls/min/1.73/m2 >60               ? AUDIO:  ? CT Scan:    Imaging:   I personally reviewed the following images:    Summary of Outside Records:      Assessment/Plan:  Guillermina Stewart is a 83 y.o. female who presents for evaluation of a right parathyroid adenoma with symptoms of osteoporosis and nighttime urination.  On labs patient has elevated PTH but normal calcium and vitamin-D    - Discussed observation again with patient's daughter.  Daughter would like to observe if possible  - will re-order PTH, Vit D,  DEXA scan.  - We will also obtain ultrasound of the  thyroid to follow-up nodule on thyroid  - Will arrange tele health visit in 2 months to review.     Mike Tafoya MD  [facility] Department of Otolaryngology  -[facility]

## 2024-04-22 ENCOUNTER — OFFICE VISIT (OUTPATIENT)
Dept: INTERNAL MEDICINE | Facility: CLINIC | Age: 84
End: 2024-04-22
Payer: MEDICARE

## 2024-04-22 VITALS
RESPIRATION RATE: 18 BRPM | HEART RATE: 70 BPM | SYSTOLIC BLOOD PRESSURE: 136 MMHG | BODY MASS INDEX: 23.09 KG/M2 | DIASTOLIC BLOOD PRESSURE: 86 MMHG | OXYGEN SATURATION: 97 % | TEMPERATURE: 98 F | WEIGHT: 147.38 LBS

## 2024-04-22 DIAGNOSIS — L85.3 DRY SKIN: ICD-10-CM

## 2024-04-22 DIAGNOSIS — R73.03 PREDIABETES: Primary | ICD-10-CM

## 2024-04-22 DIAGNOSIS — E78.5 HYPERLIPIDEMIA, UNSPECIFIED HYPERLIPIDEMIA TYPE: ICD-10-CM

## 2024-04-22 LAB — HBA1C MFR BLD: 5.9 %

## 2024-04-22 PROCEDURE — 83036 HEMOGLOBIN GLYCOSYLATED A1C: CPT | Mod: PBBFAC

## 2024-04-22 PROCEDURE — 99213 OFFICE O/P EST LOW 20 MIN: CPT | Mod: PBBFAC

## 2024-04-22 RX ORDER — ASPIRIN 81 MG/1
81 TABLET ORAL DAILY
Start: 2024-04-22 | End: 2025-04-22

## 2024-04-22 RX ORDER — LISINOPRIL 10 MG/1
10 TABLET ORAL DAILY
Qty: 90 TABLET | Refills: 3 | Status: SHIPPED | OUTPATIENT
Start: 2024-04-22 | End: 2025-04-22

## 2024-04-22 RX ORDER — CHOLECALCIFEROL (VITAMIN D3) 25 MCG
1000 TABLET ORAL DAILY
COMMUNITY

## 2024-04-22 RX ORDER — PRAVASTATIN SODIUM 40 MG/1
40 TABLET ORAL DAILY
Qty: 90 TABLET | Refills: 1 | Status: SHIPPED | OUTPATIENT
Start: 2024-04-22

## 2024-04-22 NOTE — PROGRESS NOTES
INTERNAL MEDICINE RESIDENT CLINIC  CLINIC NOTE    Patient Name: Guillermina Stewart  YOB: 1940    PRESENTING HISTORY       History of Present Illness:  Ms. Guillermina Stewart is a 83 y.o. female who is coming in for a follow up visit. She lives by herself but has either her daughter or son there for the majority of the day/night (by herself for a few hours). She has trouble with memory and does not remember her childrens' names.   Patient with no acute complaints today.  Accompanied by daughter. Not oriented at all.  She needs medication refills.  Followed by ENT for possible parathyroidectomy but family would like conservative management at this time due to patient's age and mental status. Continues on prolia.     ROS  Constitutional: no fever/chills  EENT: no sore throat, ear pain, sinus pain/congestion, nasal congestion/drainage  Respiratory: no cough, no wheezing, no shortness of breath  Cardiovascular: no chest pain, no palpitations, no edema  Gastrointestinal: no nausea, vomiting, or diarrhea. No abdominal pain  Genitourinary: no dysuria, no urinary frequency or urgency, no hematuria  Integumentary: no skin rash or abnormal lesion  Neurologic: ao x0, no headache, no dizziness, no weakness or numbness      PAST HISTORY:     Past Medical History:   Diagnosis Date    HLD (hyperlipidemia)     HTN (hypertension)     Memory loss     Osteoporosis        Past Surgical History:   Procedure Laterality Date    hemorroidectomy      HYSTERECTOMY         Family History   Problem Relation Name Age of Onset    Anxiety disorder Father Tiago Langford        Social History     Socioeconomic History    Marital status:     Number of children: 3   Occupational History    Occupation: retired   Tobacco Use    Smoking status: Every Day     Current packs/day: 1.00     Average packs/day: 1 pack/day for 60.0 years (60.0 ttl pk-yrs)     Types: Cigarettes    Smokeless tobacco: Never    Tobacco comments:     Not ready to quit    Substance and Sexual Activity    Alcohol use: Not Currently    Drug use: Never    Sexual activity: Not Currently     Partners: Female     Social Determinants of Health     Financial Resource Strain: Low Risk  (5/19/2023)    Overall Financial Resource Strain (CARDIA)     Difficulty of Paying Living Expenses: Not hard at all   Food Insecurity: No Food Insecurity (5/19/2023)    Hunger Vital Sign     Worried About Running Out of Food in the Last Year: Never true     Ran Out of Food in the Last Year: Never true   Transportation Needs: No Transportation Needs (6/23/2022)    PRAPARE - Transportation     Lack of Transportation (Medical): No     Lack of Transportation (Non-Medical): No   Physical Activity: Inactive (5/19/2023)    Exercise Vital Sign     Days of Exercise per Week: 0 days     Minutes of Exercise per Session: 0 min   Stress: No Stress Concern Present (5/19/2023)    [place] of Occupational Health - Occupational Stress Questionnaire     Feeling of Stress : Not at all   Social Connections: Socially Isolated (5/19/2023)    Social Connection and Isolation Panel [NHANES]     Frequency of Communication with Friends and Family: More than three times a week     Frequency of Social Gatherings with Friends and Family: More than three times a week     Attends Holiness Services: Never     Active Member of Clubs or Organizations: No     Attends Club or Organization Meetings: Never     Marital Status:    Housing Stability: Low Risk  (5/19/2023)    Housing Stability Vital Sign     Unable to Pay for Housing in the Last Year: No     Number of Places Lived in the Last Year: 1     Unstable Housing in the Last Year: No       MEDICATIONS & ALLERGIES:     Current Outpatient Medications   Medication Sig Dispense Refill    aspirin (ECOTRIN) 81 MG EC tablet Take 1 tablet (81 mg total) by mouth once daily. 90 tablet 1    lisinopriL 10 MG tablet Take 1 tablet (10 mg total) by mouth once daily. 90 tablet 3    memantine  "(NAMENDA XR) 28 mg CSpX Take 1 capsule (28 mg total) by mouth once daily. 30 capsule 2    polyethylene glycol (GLYCOLAX) 17 gram PwPk Take 17 g by mouth once daily. (Patient not taking: Reported on 4/18/2024)  0    pravastatin (PRAVACHOL) 40 MG tablet Take 1 tablet (40 mg total) by mouth once daily. 90 tablet 1    PROLIA 60 mg/mL Syrg Inject into skin of abdomen, upper arm, or upper thigh every 6 months 1 each 0     No current facility-administered medications for this visit.       Review of patient's allergies indicates:   Allergen Reactions    Propoxyphene     Tramadol Nausea And Vomiting       OBJECTIVE:   Vital Signs:  There were no vitals filed for this visit.      No results found for this or any previous visit (from the past 24 hour(s)).      Physical Exam    General - Appears comfortable, does not converse much. Daughter answering most questions  Mental Status -not speaking much, oriented to person not place or time   HEENT - no rhinorrhea   Cardiac - RRR, no murmurs, rubs, or gallops; no edema in LE   Respiratory - breathing comfortably; clear to ascultation bilaterally   Abdominal - nondistended, soft, nontender to palpation   Extremities - LE, UE, and joints are nonerythematous and non swollen  Skin - thin skin with no rashes or bruises seen on skin          Laboratory  Lab Results   Component Value Date    WBC 14.15 (H) 01/14/2024    HGB 13.8 01/14/2024    HCT 40.8 01/14/2024    MCV 94.9 (H) 01/14/2024     01/14/2024     @[id number](GLU,NA,K,Cl,CO2,BUN,Creatinine,Calcium,MG)@  No results found for: "INR", "PROTIME"  Lab Results   Component Value Date    HGBA1C 5.7 01/30/2023     No results for input(s): "POCTGLUCOSE" in the last 72 hours.        ASSESSMENT & PLAN:     Recurrent UTI  -has had at least 3 UTI's in last year  -urology appt 10/19/23    Osteoporosis  -likely secondary to smoking history, age and history of hysterectomy; now also has hyperparathyroidism  -Prolia injections started " 7/2016, q6months. Continue  -increase Vit D to 2000 daily.   -DEXA 2022 showed osteoporosis and stable from 2020     Hyperparathyroidism  -has osteoporosis so meets criteria for surgery  -Endocrinology appointment scheduled for August 2023 in about 1 month from now.   -have tried to collect urine calcium and cr before but unable.   -following ENT for parathyroid but not interested in surgery at this time     TIRADS 5 thyroid nodules  -has TIRADS 5 subcentimeter nodules to small to biopsy-rec is for repeat US in 1 year.  -repeat us unchanged    Dementia  Concern for Alzheimer's  -continue memantine 21mg daily  -B12, Folate, Syphilis oracio, LATOYA, RF, ESR, CRP are normal  -Assessed medical decision-making capacity and she does not have capacity. Her oldest daughter, Patricia, would be the medical decision maker. Her number is in the chart  -continue following with Geriatrics clinic    HTN  -continue lisinopril 5mg daily  -d/c'd HCTZ in past b/c hypercalcemia    Tobacco use  -is smoking > 1/2 ppd  -not looking to quit at this time    Hyperlipidemia  -continue pravastatin 40mg    Abdominal Aortic Aneurysm  -Inferior abdominal aortic aneurysm 4.3 cm. - noted on CT scan 11/2022  -CT scan 1/24: does not appear larger    Health maintenance  -due for tdap, pneumovax, shingrix, flu - patient refusing vaccines  -doesn't need routine cancer screenings given age (mammogram, pap smear, low-dose CT).   -patient has >30 pack year smoking history    Other patient information  2021-lives in [place]. Her  passed away over 15 years ago. Grandson passed away. Used to arm-wrestle and likes to watch wrestling on TV. 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 She has 2 daughters and 1 son.  2022-currently lives with her son. She does drive and visit friends' houses.  2023 - Ms. Stewart no longer drives. She is doing OK, living in her house with her son and daughter keeping a close eye on her.    Follow up in 9  months.     Juliet Michelle MD  Internal Medicine PGY-1

## 2024-04-24 ENCOUNTER — OFFICE VISIT (OUTPATIENT)
Dept: UROLOGY | Facility: CLINIC | Age: 84
End: 2024-04-24
Payer: MEDICARE

## 2024-04-24 VITALS
BODY MASS INDEX: 23.39 KG/M2 | TEMPERATURE: 98 F | SYSTOLIC BLOOD PRESSURE: 101 MMHG | HEIGHT: 67 IN | WEIGHT: 149 LBS | RESPIRATION RATE: 20 BRPM | HEART RATE: 75 BPM | DIASTOLIC BLOOD PRESSURE: 66 MMHG | OXYGEN SATURATION: 97 %

## 2024-04-24 DIAGNOSIS — N39.0 RECURRENT UTI: Primary | ICD-10-CM

## 2024-04-24 LAB
BILIRUB SERPL-MCNC: NORMAL MG/DL
BLOOD URINE, POC: NORMAL
COLOR, POC UA: NORMAL
GLUCOSE UR QL STRIP: NORMAL
KETONES UR QL STRIP: NORMAL
LEUKOCYTE ESTERASE URINE, POC: NORMAL
NITRITE, POC UA: NORMAL
PH, POC UA: 5.5
PROTEIN, POC: 100
SPECIFIC GRAVITY, POC UA: >=1.03
UROBILINOGEN, POC UA: 0.2

## 2024-04-24 PROCEDURE — 3078F DIAST BP <80 MM HG: CPT | Mod: CPTII,,, | Performed by: NURSE PRACTITIONER

## 2024-04-24 PROCEDURE — 3288F FALL RISK ASSESSMENT DOCD: CPT | Mod: CPTII,,, | Performed by: NURSE PRACTITIONER

## 2024-04-24 PROCEDURE — 81001 URINALYSIS AUTO W/SCOPE: CPT | Mod: PBBFAC | Performed by: NURSE PRACTITIONER

## 2024-04-24 PROCEDURE — 1159F MED LIST DOCD IN RCRD: CPT | Mod: CPTII,,, | Performed by: NURSE PRACTITIONER

## 2024-04-24 PROCEDURE — 1101F PT FALLS ASSESS-DOCD LE1/YR: CPT | Mod: CPTII,,, | Performed by: NURSE PRACTITIONER

## 2024-04-24 PROCEDURE — 1126F AMNT PAIN NOTED NONE PRSNT: CPT | Mod: CPTII,,, | Performed by: NURSE PRACTITIONER

## 2024-04-24 PROCEDURE — 99214 OFFICE O/P EST MOD 30 MIN: CPT | Mod: PBBFAC | Performed by: NURSE PRACTITIONER

## 2024-04-24 PROCEDURE — 1160F RVW MEDS BY RX/DR IN RCRD: CPT | Mod: CPTII,,, | Performed by: NURSE PRACTITIONER

## 2024-04-24 PROCEDURE — 99213 OFFICE O/P EST LOW 20 MIN: CPT | Mod: S$PBB,,, | Performed by: NURSE PRACTITIONER

## 2024-04-24 PROCEDURE — 3074F SYST BP LT 130 MM HG: CPT | Mod: CPTII,,, | Performed by: NURSE PRACTITIONER

## 2024-04-24 RX ORDER — ACETAMINOPHEN 160 MG/1
160 BAR, CHEWABLE ORAL EVERY 4 HOURS PRN
COMMUNITY

## 2024-04-24 NOTE — PROGRESS NOTES
Chief Complaint:   Chief Complaint   Patient presents with    recurrent UTI       HPI:  Patient is a 82-year-old female referred to Urology for recurrent UTI.  Today patient presents without any episodes of dysuria, urinary frequency, urinary urgency, urinary hesitancy, urinary retention, urinary incontinence, nocturia, gross hematuria.  Instructed patient RTC 6 months for re-evaluation.  Instructed patient if develops any abnormal urologic symptoms notify clinic to be re-evaluated treated or go to emergency room during after hours.    Allergies:  Review of patient's allergies indicates:   Allergen Reactions    Propoxyphene     Tramadol Nausea And Vomiting       Medications:  Current Outpatient Medications   Medication Sig Dispense Refill    aspirin (ECOTRIN) 81 MG EC tablet Take 1 tablet (81 mg total) by mouth once daily.      lisinopriL 10 MG tablet Take 1 tablet (10 mg total) by mouth once daily. 90 tablet 3    memantine (NAMENDA XR) 28 mg CSpX Take 1 capsule (28 mg total) by mouth once daily. 30 capsule 2    polyethylene glycol (GLYCOLAX) 17 gram PwPk Take 17 g by mouth once daily.  0    pravastatin (PRAVACHOL) 40 MG tablet Take 1 tablet (40 mg total) by mouth once daily. 90 tablet 1    PROLIA 60 mg/mL Syrg Inject into skin of abdomen, upper arm, or upper thigh every 6 months 1 each 0    vitamin D (VITAMIN D3) 1000 units Tab Take 1,000 Units by mouth once daily.       No current facility-administered medications for this visit.       Review of Systems:  General: No fever, chills, fatigability, or weight loss.  Skin: No rashes, itching, or changes in color or texture of skin.  Chest: Denies THOMAS, cyanosis, wheezing, cough, and sputum production.  Abdomen: Appetite fine. No weight loss. Denies diarrhea, abdominal pain, hematemesis, or blood in stool.  Musculoskeletal: No joint stiffness or swelling. Denies back pain.  : As above.  All other review of systems negative.    PMH:  Past Medical History:   Diagnosis  Date    HLD (hyperlipidemia)     HTN (hypertension)     Memory loss     Osteoporosis        PSH:  Past Surgical History:   Procedure Laterality Date    hemorroidectomy      HYSTERECTOMY         FamHx:  Family History   Problem Relation Name Age of Onset    Anxiety disorder Father Tiago Langford        SocHx:  Social History     Socioeconomic History    Marital status:     Number of children: 3   Occupational History    Occupation: retired   Tobacco Use    Smoking status: Every Day     Current packs/day: 1.00     Average packs/day: 1 pack/day for 60.0 years (60.0 ttl pk-yrs)     Types: Cigarettes     Passive exposure: Current    Smokeless tobacco: Never    Tobacco comments:     Not ready to quit   Substance and Sexual Activity    Alcohol use: Not Currently    Drug use: Never    Sexual activity: Not Currently     Partners: Female     Social Determinants of Health     Financial Resource Strain: Low Risk  (4/22/2024)    Overall Financial Resource Strain (CARDIA)     Difficulty of Paying Living Expenses: Not hard at all   Food Insecurity: No Food Insecurity (4/22/2024)    Hunger Vital Sign     Worried About Running Out of Food in the Last Year: Never true     Ran Out of Food in the Last Year: Never true   Transportation Needs: No Transportation Needs (4/22/2024)    PRAPARE - Transportation     Lack of Transportation (Medical): No     Lack of Transportation (Non-Medical): No   Physical Activity: Insufficiently Active (4/22/2024)    Exercise Vital Sign     Days of Exercise per Week: 5 days     Minutes of Exercise per Session: 20 min   Stress: No Stress Concern Present (4/22/2024)    [employer] [place] [employer] - Occupational Stress Questionnaire     Feeling of Stress : Not at all   Social Connections: Moderately Isolated (4/22/2024)    Social Connection and Isolation Panel [NHANES]     Frequency of Communication with Friends and Family: Twice a week     Frequency of Social Gatherings with Friends and  Family: More than three times a week     Attends Sikh Services: More than 4 times per year     Active Member of Clubs or Organizations: No     Attends Club or Organization Meetings: Never     Marital Status:    Housing Stability: Unknown (4/22/2024)    Housing Stability Vital Sign     Unable to Pay for Housing in the Last Year: No     Homeless in the Last Year: No       Physical Exam:  There were no vitals filed for this visit.  General: A&Ox3, no apparent distress, no deformities  Neck: No masses, normal thyroid  Lungs: CTA sonido, no use of accessory muscles  Heart: RRR, no arrhythmias  Abdomen: Soft, NT, ND, no masses, no hernias, no hepatosplenomegaly  Lymphatic: Neck and groin nodes negative  Skin: The skin is warm and dry. No jaundice.  Ext: No c/c/e.    Urinalysis:      Component 11:11   Color, UA Dark Yellow   Spec Grav UA >=1.030   pH, UA 5.5   WBC, UA trace   Nitrite, UA neg   Protein,    Glucose, UA neg   Ketones, UA trace   Urobilinogen, UA 0.2   Bilirubin, POC small   Blood, UA neg      Microscopic urinalysis revealed negative blood, nitrites, trace WBCs.      Impression:  1. Recurrent UTI  - POCT URINE DIPSTICK WITH MICROSCOPE, AUTOMATED      Plan:  Instructed patient RTC 6 months for re-evaluation.  If develops any abnormal urologic symptoms notify clinic to be re-evaluate treated or go to emergency room during after hours.

## 2024-04-25 ENCOUNTER — HOSPITAL ENCOUNTER (OUTPATIENT)
Dept: RADIOLOGY | Facility: HOSPITAL | Age: 84
Discharge: HOME | End: 2024-04-25
Attending: STUDENT IN AN ORGANIZED HEALTH CARE EDUCATION/TRAINING PROGRAM
Payer: MEDICARE

## 2024-04-25 DIAGNOSIS — E21.0 PRIMARY HYPERPARATHYROIDISM: ICD-10-CM

## 2024-04-25 PROCEDURE — 77080 DXA BONE DENSITY AXIAL: CPT | Mod: TC

## 2024-04-25 PROCEDURE — 76536 US EXAM OF HEAD AND NECK: CPT | Mod: TC

## 2024-05-11 NOTE — PROGRESS NOTES
Date of Service: 2/29/2024    Attending Attestation: Patient discussed with geriatric fellow Dr. Samuel . The chart was reviewed thoroughly including pertinent vitals, labs, imaging, medications, prior notes, and consultant/specialist recommendations.  I participated in the management of the patient, examined the patient, reviewed the summary of the plan, and was immediately available at all times throughout the encounter. Services were furnished in a primary care center located in the outpatient department of a teaching hospital. I agree with the fellow's findings and plan as documented in their note.    Vidya Livingston MD  Attending - Family Medicine / Geriatric Medicine  [facility] [place], [facility]

## 2024-05-16 DIAGNOSIS — F03.90 DEMENTIA, UNSPECIFIED DEMENTIA SEVERITY, UNSPECIFIED DEMENTIA TYPE, UNSPECIFIED WHETHER BEHAVIORAL, PSYCHOTIC, OR MOOD DISTURBANCE OR ANXIETY: ICD-10-CM

## 2024-05-16 RX ORDER — MEMANTINE HYDROCHLORIDE 28 MG/1
1 CAPSULE, EXTENDED RELEASE ORAL DAILY
Qty: 90 CAPSULE | Refills: 1 | Status: SHIPPED | OUTPATIENT
Start: 2024-05-16

## 2024-05-28 NOTE — PROGRESS NOTES
I have reviewed and agree with the resident's findings, including all diagnostic interpretations and plans as written.     Yaron Duran M.D.

## 2024-06-12 ENCOUNTER — CLINICAL SUPPORT (OUTPATIENT)
Dept: OTOLARYNGOLOGY | Facility: CLINIC | Age: 84
End: 2024-06-12
Payer: MEDICARE

## 2024-06-12 DIAGNOSIS — E21.0 PRIMARY HYPERPARATHYROIDISM: Primary | ICD-10-CM

## 2024-06-12 NOTE — PROGRESS NOTES
Established Patient - Audio Only Telehealth Visit     The patient location is: home  The chief complaint leading to consultation is: hyperparathyroidism  Visit type: Virtual visit with audio only (telephone)  Total time spent with patient: 10    The reason for the audio only service rather than synchronous audio and video virtual visit was related to technical difficulties or patient preference/necessity.     Each patient to whom I provide medical services by telemedicine is:  (1) informed of the relationship between the physician and patient and the respective role of any other health care provider with respect to management of the patient; and (2) notified that they may decline to receive medical services by telemedicine and may withdraw from such care at any time. Patient verbally consented to receive this service via voice-only telephone call.       HPI:  83 y.o. female who presents for evaluation of a right parathyroid adenoma with symptoms of osteoporosis and nighttime urination.  On labs patient has elevated PTH but normal calcium and vitamin-D.      Assessment and plan:    83 y.o. female who presents for evaluation of a right parathyroid adenoma with symptoms of osteoporosis and nighttime urination.  On labs patient has elevated PTH but normal calcium and vitamin-D     Recent PTH elevated to 130. Vit D WNL.   DEXA with osteoporosis.   Thyroid US without need for biopsy.    Patient and daughter would like to observe without proceeding with surgery at this time.   Rtc 6 months.     Mike Tafoya MD  [facility], PGY-3     This service was not originating from a related E/M service provided within the previous 7 days nor will  to an E/M service or procedure within the next 24 hours or my soonest available appointment.  Prevailing standard of care was able to be met in this audio-only visit.

## 2024-08-29 ENCOUNTER — OFFICE VISIT (OUTPATIENT)
Dept: FAMILY MEDICINE | Facility: CLINIC | Age: 84
End: 2024-08-29
Payer: MEDICARE

## 2024-08-29 VITALS
RESPIRATION RATE: 20 BRPM | HEART RATE: 83 BPM | TEMPERATURE: 98 F | DIASTOLIC BLOOD PRESSURE: 70 MMHG | HEIGHT: 66 IN | WEIGHT: 148.63 LBS | BODY MASS INDEX: 23.89 KG/M2 | SYSTOLIC BLOOD PRESSURE: 106 MMHG | OXYGEN SATURATION: 96 %

## 2024-08-29 DIAGNOSIS — Z72.0 TOBACCO USER: ICD-10-CM

## 2024-08-29 DIAGNOSIS — F03.90 ADVANCING DEMENTIA: Primary | ICD-10-CM

## 2024-08-29 DIAGNOSIS — I10 PRIMARY HYPERTENSION: ICD-10-CM

## 2024-08-29 DIAGNOSIS — Z72.0 NOT CONSIDERING QUITTING USE OF TOBACCO: ICD-10-CM

## 2024-08-29 DIAGNOSIS — K59.00 CONSTIPATION, UNSPECIFIED CONSTIPATION TYPE: ICD-10-CM

## 2024-08-29 DIAGNOSIS — F01.50 MIXED VASCULAR AND NEURODEGENERATIVE DEMENTIA WITHOUT BEHAVIORAL DISTURBANCE: ICD-10-CM

## 2024-08-29 DIAGNOSIS — M81.0 OSTEOPOROSIS, UNSPECIFIED OSTEOPOROSIS TYPE, UNSPECIFIED PATHOLOGICAL FRACTURE PRESENCE: ICD-10-CM

## 2024-08-29 DIAGNOSIS — E78.5 HYPERLIPIDEMIA, UNSPECIFIED HYPERLIPIDEMIA TYPE: ICD-10-CM

## 2024-08-29 PROCEDURE — 99214 OFFICE O/P EST MOD 30 MIN: CPT | Mod: PBBFAC

## 2024-08-29 RX ORDER — MEMANTINE HYDROCHLORIDE 28 MG/1
28 CAPSULE, EXTENDED RELEASE ORAL DAILY
Qty: 30 CAPSULE | Refills: 11 | Status: SHIPPED | OUTPATIENT
Start: 2024-08-29 | End: 2024-08-29

## 2024-08-29 RX ORDER — AMOXICILLIN 250 MG
1 CAPSULE ORAL DAILY
Qty: 30 TABLET | Refills: 11 | Status: SHIPPED | OUTPATIENT
Start: 2024-08-29

## 2024-08-29 RX ORDER — MEMANTINE HYDROCHLORIDE 28 MG/1
28 CAPSULE, EXTENDED RELEASE ORAL DAILY
Qty: 30 CAPSULE | Refills: 11 | Status: SHIPPED | OUTPATIENT
Start: 2024-08-29 | End: 2025-08-24

## 2024-08-29 NOTE — PROGRESS NOTES
[facility]  [facility] Geriatric Assessment Clinic Note    DOS: 8/29/2024    PCP Maurizio    Subjective:    Chief Complaint:  dementia f/u, c/o constipation    History of Present Illness:  Guillermina Stewart is an 84-year-old white female who presents to Geriatrics Clinic for follow-up of dementia.  Patient is currently taking Namenda XR 28 mg daily.  Is also taking aspirin 81 mg, lisinopril 10 mg, pravastatin 40 mg, vitamin-D 1000 units.  She is here today with her son, Nay.  He manages her medications.      Patient lives in a single-story house with a porch that has a ramp.  Patient's son stays with her at her house every night.  He has children that he leaves at home.  During the daytime patient's daughter,  Leila, takes care of her therefore she is hardly ever alone.    Dementia has worsened since last visit.  Ms. Stewart is able to recognize faces unable to recall names, including the names of her children.  Son was the historian, patient looks to  him with every question.  No side effects from Namenda.  Patient has been taking Namenda 28 mg since 10/18/2023.  Although memory continues to worsen son can see a difference in when she is taking Namenda.  Pill packets are made for patient, son picks them up and her son and Leila give them to her.  No auditory or visual hallucinations, tremor, gait disturbance.  Patient continues to smoke cigarettes, she goes outside on the front porch she smoke however she does not leave porch.  No issues with elopement since daughter installed alarms in patient's home.  No behavioral issues just worsening of memory.  Son states she has most confused in the morning and after innings    Acute issues:  Constipation.  Patient is having infrequent bowel movements, used to go daily when taking MiraLax.  Longest patient has gone without a bowel movement is 4 days.  Appetite is good no weight loss.  No melena, no hematochezia, no dark tarry stools.  Would like to  try a medication other than MiraLax for constipation.  Patient has gained 4 lb since she was last seen by a provider.      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  Tobacco use, doesn't want to quit        Past Surgical History:   Procedure Laterality Date    hemorroidectomy      HYSTERECTOMY        Family History   Problem Relation Name Age of Onset    Anxiety disorder Father Tiago Langford       Health Maintenance Reviewed:  HIV Screening:  Nonreactive 03/04/2021  Hepatitis Screening:   Lab Results   Component Value Date    HEPAIGM Nonreactive 03/04/2021    HEPBIGM Nonreactive 03/04/2021    HEPCAB Nonreactive 03/04/2021      Osteoporosis Screening: DEXA showed osteoporosis, taking vitamin-D and Prolia    Review of patient's allergies indicates:   Allergen Reactions    Propoxyphene     Tramadol Nausea And Vomiting      Current Outpatient Medications:     acetaminophen (TYLENOL) 160 MG Chew, Take 160 mg by mouth every 4 (four) hours as needed., Disp: , Rfl:     aspirin (ECOTRIN) 81 MG EC tablet, Take 1 tablet (81 mg total) by mouth once daily., Disp: , Rfl:     lisinopriL 10 MG tablet, Take 1 tablet (10 mg total) by mouth once daily., Disp: 90 tablet, Rfl: 3    memantine (NAMENDA XR) 28 mg CSpX, Take 1 capsule (28 mg total) by mouth once daily., Disp: 30 capsule, Rfl: 11    polyethylene glycol (GLYCOLAX) 17 gram PwPk, Take 17 g by mouth once daily., Disp: , Rfl: 0    pravastatin (PRAVACHOL) 40 MG tablet, Take 1 tablet (40 mg total) by mouth once daily., Disp: 90 tablet, Rfl: 1    PROLIA 60 mg/mL Syrg, Inject into skin of abdomen, upper arm, or upper thigh every 6 months, Disp: 1 each, Rfl: 0    vitamin D (VITAMIN D3) 1000 units Tab, Take 1,000 Units by mouth once daily., Disp: , Rfl:     senna-docusate 8.6-50  "mg (SENNA WITH DOCUSATE SODIUM) 8.6-50 mg per tablet, Take 1 tablet by mouth once daily., Disp: 30 tablet, Rfl: 11     Review of Systems   Constitutional:  Negative for chills, fever and weight loss.   HENT:  Negative for ear pain, hearing loss, nosebleeds, sinus pain and sore throat.    Eyes:  Negative for blurred vision and double vision.   Respiratory:  Negative for cough, sputum production, shortness of breath and wheezing.    Cardiovascular:  Negative for chest pain, palpitations, claudication and leg swelling.   Gastrointestinal:  Positive for constipation. Negative for abdominal pain, blood in stool, diarrhea, melena, nausea and vomiting.   Genitourinary:  Negative for dysuria, flank pain, frequency, hematuria and urgency.   Musculoskeletal:  Negative for falls, joint pain and myalgias.   Skin:  Negative for rash.   Neurological:  Negative for dizziness, tingling, tremors, sensory change, speech change, focal weakness and headaches.   Endo/Heme/Allergies:  Bruises/bleeds easily.   Psychiatric/Behavioral: Negative.          Objective:   Vitals:    08/29/24 1339   BP: 106/70   BP Location: Right arm   Patient Position: Sitting   BP Method: Medium (Manual)   Pulse: 83   Resp: 20   Temp: 97.6 °F (36.4 °C)   TempSrc: Oral   SpO2: 96%   Weight: 67.4 kg (148 lb 9.6 oz)   Height: 5' 6" (1.676 m)        Physical Exam  Vitals and nursing note reviewed.   HENT:      Head: Normocephalic and atraumatic.      Comments: Cerum present but not causing occlusion, no problems with hearing     Right Ear: Tympanic membrane and ear canal normal.      Left Ear: Tympanic membrane and ear canal normal.      Nose: Nose normal. No congestion or rhinorrhea.      Mouth/Throat:      Mouth: Mucous membranes are moist.      Pharynx: Oropharynx is clear. No oropharyngeal exudate or posterior oropharyngeal erythema.   Eyes:      General: No scleral icterus.     Extraocular Movements: Extraocular movements intact.      Conjunctiva/sclera: " Conjunctivae normal.      Pupils: Pupils are equal, round, and reactive to light.   Cardiovascular:      Rate and Rhythm: Normal rate and regular rhythm.      Heart sounds: No murmur heard.  Pulmonary:      Effort: Pulmonary effort is normal.      Breath sounds: Normal breath sounds. No wheezing, rhonchi or rales.   Abdominal:      General: Abdomen is flat. Bowel sounds are normal. There is no distension.      Palpations: Abdomen is soft.      Tenderness: There is no abdominal tenderness. There is no guarding.   Musculoskeletal:         General: No swelling, tenderness, deformity or signs of injury.      Cervical back: No rigidity or tenderness.      Right lower leg: No edema.      Left lower leg: No edema.   Lymphadenopathy:      Cervical: No cervical adenopathy.   Skin:     General: Skin is warm and dry.      Capillary Refill: Capillary refill takes less than 2 seconds.      Findings: Bruising present. No erythema or rash.   Neurological:      General: No focal deficit present.      Mental Status: She is alert.      Cranial Nerves: Cranial nerves 2-12 are intact. No cranial nerve deficit, dysarthria or facial asymmetry.      Sensory: No sensory deficit.      Motor: No weakness, tremor or atrophy.      Coordination: Coordination normal.      Gait: Gait is intact. Gait normal.      Comments: Pt is oriented to self and place       Geriatric Assessment:   Geriatric Assessment  6/2/22  SLUMS 5/30, positive screen for dementia  Education level: 8th grade    No falls   Does not use any assistive devices   Good appetite, gained 4 lb  Constipation see above, bladder incontinence, no dysuria.  Sleeping well  Does not drive  Patient has dentures and glasses.  No difficulty with hearing. Saw Opthalmologic a few months ago.  ADLs/iADL:  Takes baths, able to get in and out of the bathtub without assistance, does not need assistance with bathing, picks outfits and dresses herself.  Good mobility, transfers independently. Folds  laundry, assistance with washing and drying.   Patient's children provide assistance with bathing cleaning cooking and finances.    Activities of Daily Living (ADLs):  Walking independent  Transferring independent  Feeding independent  Bathing independent  Toileting independent  Dressing/Grooming independent    Instrumental Activities of Daily Living (IADLs):  Finances fully dependent  Transportation fully dependent  Cooking fully dependent  Cleaning/Laundry   Shopping fully dependent  Telephone / Communication fully dependent  Medications fully dependent    Cognitive Assessment:  SLUMS performed date: 6/2/22 and scanned to patient's chart in media, Score 5/30  positive screen for dementia    Depression Assessment: PHQ-2: 0, negative screen    Mobility Assessment:   Falls in the past 2 years? Yes  none recently    Social support/Living Situation:   Housing: yes  Family support: yes 1 son Nay and 2 daughters, Haven and Leila  Driving? No  Left appliances on/ water running / doors open / other safety concerns?  No   Polypharmacy Identified? (>9 meds) No    Advanced Care Planning:  - [place] POST: Not done, no documents in system  - Medical POA:   - Full Code    Lab Results   Component Value Date    WBC 14.15 (H) 01/14/2024    RBC 4.30 01/14/2024    HGB 13.8 01/14/2024    HCT 40.8 01/14/2024    MCV 94.9 (H) 01/14/2024    MCH 32.1 (H) 01/14/2024    MCHC 33.8 01/14/2024    RDW 13.2 01/14/2024     01/14/2024    MPV 12.3 (H) 01/14/2024      CMP:  Sodium   Date Value Ref Range Status   01/14/2024 143 136 - 145 mmol/L Final     Potassium   Date Value Ref Range Status   01/14/2024 4.0 3.5 - 5.1 mmol/L Final     Chloride   Date Value Ref Range Status   01/14/2024 110 (H) 98 - 107 mmol/L Final     CO2   Date Value Ref Range Status   01/14/2024 23 23 - 31 mmol/L Final     Blood Urea Nitrogen   Date Value Ref Range Status   01/14/2024 21.7 (H) 9.8 - 20.1 mg/dL Final     Creatinine   Date Value Ref Range Status   01/14/2024  0.89 0.55 - 1.02 mg/dL Final     Calcium   Date Value Ref Range Status   01/14/2024 9.8 8.4 - 10.2 mg/dL Final     Albumin   Date Value Ref Range Status   01/14/2024 3.7 3.4 - 4.8 g/dL Final     Bilirubin Total   Date Value Ref Range Status   01/14/2024 0.7 <=1.5 mg/dL Final     ALP   Date Value Ref Range Status   01/14/2024 81 40 - 150 unit/L Final     AST   Date Value Ref Range Status   01/14/2024 18 5 - 34 unit/L Final     ALT   Date Value Ref Range Status   01/14/2024 16 0 - 55 unit/L Final     Estimated GFR-Non    Date Value Ref Range Status   06/23/2022 >60 mls/min/1.73/m2 Final        Lab Results   Component Value Date     01/14/2024    K 4.0 01/14/2024     (H) 01/14/2024    CO2 23 01/14/2024    BUN 21.7 (H) 01/14/2024    CREATININE 0.89 01/14/2024    CALCIUM 9.8 01/14/2024    EGFRNONAA >60 06/23/2022      Lab Results   Component Value Date    HGBA1C 5.7 01/30/2023      TSH:  Lab Results   Component Value Date    TSH 1.336 01/30/2023     Recent Imaging:    DXA Bone Density Axial Skeleton 1 or more sites  Narrative: EXAMINATION:  DXA BONE DENSITY AXIAL SKELETON 1 OR MORE SITES    CLINICAL HISTORY:  Primary hyperparathyroidism    COMPARISON:  02/24/2022    FINDINGS:  L1-3 vertebral bone mineral density is equal to 0.810 g/cm squared with a T score of -3.0.  This has not significantly changed compared to the prior study.    Femoral neck bone mineral density is equal to 0.581 g/cm squared with a T score of -3.3.  This is decreased from the prior study.  Impression: 1. Osteoporosis according to WHO criteria.    Electronically signed by: Angus Alvarez MD  Date:    04/25/2024  Time:      17:27  US Thyroid 4/25/24  FINDINGS: Right thyroid lobe measures 3.1 cm.  Left thyroid lobe measures 3.2 cm.  Isthmus measures 0.4 cm.There are subcentimeter hypoechoic nodules bilaterally.  No dominant nodule.  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  Between son and daughter patient is rarely alone.  Continue getting pharmacy to use blister packs her patient's medication.  No significant behavorial disturbances, continue to monitor.   Risk factor modification. HTN at goal. Still smoking not motivated to quit    Constipation, unspecified constipation type  Senna-docusate 8.6-50 mg   Continue Miralax for 1-2 weeks then stop and continue Senna-Docusate    Osteoporosis, unspecified osteoporosis type, unspecified pathological fracture presence  DEXA above, continue to discuss with PCP, on Prolia and taking vitamin-D    Tobacco user  Not considering quitting use of tobacco  Tried to speak to patient about tobacco use, did not say anything, son also encouraging her to quit.    Primary hypertension  Continue lisinopril per PCP    Hyperlipidemia  Continue pravastatin per PCP    Follow Up in [facility] in 1 year or sooner PRN

## 2024-09-12 ENCOUNTER — TELEPHONE (OUTPATIENT)
Dept: INTERNAL MEDICINE | Facility: CLINIC | Age: 84
End: 2024-09-12
Payer: MEDICARE

## 2024-09-19 ENCOUNTER — OFFICE VISIT (OUTPATIENT)
Dept: INTERNAL MEDICINE | Facility: CLINIC | Age: 84
End: 2024-09-19
Payer: MEDICARE

## 2024-09-19 ENCOUNTER — LAB VISIT (OUTPATIENT)
Dept: LAB | Facility: HOSPITAL | Age: 84
End: 2024-09-19
Attending: STUDENT IN AN ORGANIZED HEALTH CARE EDUCATION/TRAINING PROGRAM
Payer: MEDICARE

## 2024-09-19 VITALS
BODY MASS INDEX: 23.78 KG/M2 | SYSTOLIC BLOOD PRESSURE: 134 MMHG | HEART RATE: 67 BPM | TEMPERATURE: 98 F | WEIGHT: 148 LBS | OXYGEN SATURATION: 95 % | DIASTOLIC BLOOD PRESSURE: 83 MMHG | HEIGHT: 66 IN

## 2024-09-19 DIAGNOSIS — F03.90 DEMENTIA, UNSPECIFIED DEMENTIA SEVERITY, UNSPECIFIED DEMENTIA TYPE, UNSPECIFIED WHETHER BEHAVIORAL, PSYCHOTIC, OR MOOD DISTURBANCE OR ANXIETY: ICD-10-CM

## 2024-09-19 DIAGNOSIS — M81.0 OSTEOPOROSIS, UNSPECIFIED OSTEOPOROSIS TYPE, UNSPECIFIED PATHOLOGICAL FRACTURE PRESENCE: ICD-10-CM

## 2024-09-19 DIAGNOSIS — I71.40 ABDOMINAL AORTIC ANEURYSM (AAA) WITHOUT RUPTURE, UNSPECIFIED PART: ICD-10-CM

## 2024-09-19 DIAGNOSIS — E78.5 HYPERLIPIDEMIA, UNSPECIFIED HYPERLIPIDEMIA TYPE: ICD-10-CM

## 2024-09-19 DIAGNOSIS — I10 PRIMARY HYPERTENSION: ICD-10-CM

## 2024-09-19 DIAGNOSIS — N39.0 RECURRENT UTI: Primary | ICD-10-CM

## 2024-09-19 PROCEDURE — 83520 IMMUNOASSAY QUANT NOS NONAB: CPT

## 2024-09-19 RX ORDER — CETIRIZINE HYDROCHLORIDE 10 MG/1
10 TABLET ORAL DAILY
COMMUNITY

## 2024-09-23 DIAGNOSIS — N39.0 RECURRENT UTI: ICD-10-CM

## 2024-09-23 DIAGNOSIS — F03.90 DEMENTIA, UNSPECIFIED DEMENTIA SEVERITY, UNSPECIFIED DEMENTIA TYPE, UNSPECIFIED WHETHER BEHAVIORAL, PSYCHOTIC, OR MOOD DISTURBANCE OR ANXIETY: Primary | ICD-10-CM

## 2024-09-23 LAB — MAYO GENERIC ORDERABLE RESULT: ABNORMAL

## 2024-09-23 NOTE — PROGRESS NOTES
Please let patient know of results. 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

## 2024-09-26 ENCOUNTER — PATIENT MESSAGE (OUTPATIENT)
Dept: INTERNAL MEDICINE | Facility: CLINIC | Age: 84
End: 2024-09-26
Payer: MEDICARE

## 2024-09-26 DIAGNOSIS — M81.0 OSTEOPOROSIS, UNSPECIFIED OSTEOPOROSIS TYPE, UNSPECIFIED PATHOLOGICAL FRACTURE PRESENCE: ICD-10-CM

## 2024-09-26 RX ORDER — DENOSUMAB 60 MG/ML
INJECTION SUBCUTANEOUS
Qty: 1 EACH | Refills: 0 | Status: SHIPPED | OUTPATIENT
Start: 2024-09-26

## 2024-10-04 NOTE — PROGRESS NOTES
Subjective:      Guillermina Stewart  10/04/2024  [id number]      Chief Complaint: Establish Care (NP EST MD)       HPI:  Ms Sarmiento is a 83 y/o female patient who is here to establish care. Patient has hypertension, hyperlipidemia, dementia, osteoporosis. Patient is concerned about having recurrent UTI, would like referral to Urology. Family has noticed worsening memory impairment, would like to know if it is possible that is due to Alzheimers, discussed checking for tau protein in serum.     Past Medical History:   Diagnosis Date    HLD (hyperlipidemia)     HTN (hypertension)     Memory loss     Osteoporosis      Past Surgical History:   Procedure Laterality Date    hemorroidectomy      HYSTERECTOMY       Family History   Problem Relation Name Age of Onset    Anxiety disorder Father Tiago Langford      Social History     Tobacco Use    Smoking status: Every Day     Current packs/day: 1.00     Average packs/day: 1 pack/day for 60.0 years (60.0 ttl pk-yrs)     Types: Cigarettes     Passive exposure: Current    Smokeless tobacco: Never    Tobacco comments:     Not ready to quit   Substance and Sexual Activity    Alcohol use: Not Currently    Drug use: Never    Sexual activity: Not Currently     Partners: Female     Review of patient's allergies indicates:   Allergen Reactions    Propoxyphene     Tramadol Nausea And Vomiting       The following were reviewed at this visit: active problem list, medication list, allergies, family history, social history, and health maintenance.    Medications:    Current Outpatient Medications:     acetaminophen (TYLENOL) 160 MG Chew, Take 160 mg by mouth every 4 (four) hours as needed., Disp: , Rfl:     aspirin (ECOTRIN) 81 MG EC tablet, Take 1 tablet (81 mg total) by mouth once daily., Disp: , Rfl:     cetirizine (ZYRTEC) 10 MG tablet, Take 10 mg by mouth once daily., Disp: , Rfl:     lisinopriL 10 MG tablet, Take 1 tablet (10 mg total) by mouth once daily., Disp: 90 tablet, Rfl: 3    memantine  (NAMENDA XR) 28 mg CSpX, Take 1 capsule (28 mg total) by mouth once daily., Disp: 30 capsule, Rfl: 11    polyethylene glycol (GLYCOLAX) 17 gram PwPk, Take 17 g by mouth once daily., Disp: , Rfl: 0    pravastatin (PRAVACHOL) 40 MG tablet, Take 1 tablet (40 mg total) by mouth once daily., Disp: 90 tablet, Rfl: 1    senna-docusate 8.6-50 mg (SENNA WITH DOCUSATE SODIUM) 8.6-50 mg per tablet, Take 1 tablet by mouth once daily., Disp: 30 tablet, Rfl: 11    vitamin D (VITAMIN D3) 1000 units Tab, Take 1,000 Units by mouth once daily., Disp: , Rfl:     PROLIA 60 mg/mL Syrg, Inject into skin of abdomen, upper arm, or upper thigh every 6 months, Disp: 1 each, Rfl: 0      Medications have been reviewed and reconciled with patient at this visit.  Barriers to medications reviewed with patient.    Adverse reactions to current medications reviewed with patient..    Over the counter medications reviewed and reconciled with patient.  Review of Systems   Constitutional:  Negative for chills, fever, malaise/fatigue and weight loss.   HENT:  Negative for congestion, ear discharge, ear pain, hearing loss, sinus pain and sore throat.    Eyes:  Negative for photophobia, pain, discharge and redness.   Respiratory:  Negative for cough, shortness of breath and wheezing.    Cardiovascular:  Negative for chest pain, palpitations and leg swelling.   Gastrointestinal:  Negative for constipation, diarrhea, heartburn, nausea and vomiting.   Genitourinary:  Negative for dysuria, frequency and urgency.   Musculoskeletal:  Negative for falls, joint pain and myalgias.   Skin:  Negative for itching and rash.   Neurological:  Negative for dizziness, focal weakness, weakness and headaches.   Psychiatric/Behavioral:  Negative for depression and memory loss. The patient is not nervous/anxious and does not have insomnia.            Objective:      Vitals:    09/19/24 0853   BP: 134/83   BP Location: Left arm   Pulse: 67   Temp: 97.8 °F (36.6 °C)   SpO2: 95%  "  Weight: 67.1 kg (148 lb)   Height: 5' 6" (1.676 m)       Physical Exam  Constitutional:       General: She is not in acute distress.     Appearance: Normal appearance.   HENT:      Head: Normocephalic and atraumatic.   Eyes:      Extraocular Movements: Extraocular movements intact.      Pupils: Pupils are equal, round, and reactive to light.   Cardiovascular:      Rate and Rhythm: Normal rate and regular rhythm.      Pulses: Normal pulses.      Heart sounds: Normal heart sounds.   Pulmonary:      Effort: Pulmonary effort is normal.      Breath sounds: Normal breath sounds.   Abdominal:      General: Abdomen is flat. Bowel sounds are normal. There is no distension.      Palpations: Abdomen is soft.      Tenderness: There is no abdominal tenderness.   Musculoskeletal:         General: Normal range of motion.      Cervical back: Normal range of motion and neck supple.      Right lower leg: No edema.      Left lower leg: No edema.   Lymphadenopathy:      Cervical: No cervical adenopathy.   Skin:     Findings: No lesion or rash.   Neurological:      General: No focal deficit present.      Mental Status: She is alert and oriented to person, place, and time.               Assessment and Plan:       1. Recurrent UTI  Assessment & Plan:  No symptoms of UTI at this time   Will refer to Urology     Orders:  -     Cancel: Ambulatory referral/consult to Neurology; Future; Expected date: 09/26/2024    2. Dementia, unspecified dementia severity, unspecified dementia type, unspecified whether behavioral, psychotic, or mood disturbance or anxiety  Assessment & Plan:  Will refer to Neurology  Will check tau protein in serum     Orders:  -     Cancel: Ambulatory referral/consult to Urology; Future; Expected date: 09/26/2024  -     [place] GENERIC ORDERABLE ; Future; Expected date: 09/19/2024    3. Osteoporosis, unspecified osteoporosis type, unspecified pathological fracture presence  Assessment & Plan:  On Prolia injections  Last " DXA scan done in 04/2024      4. Primary hypertension  Assessment & Plan:  Controlled  Continue lisinopril       5. Hyperlipidemia, unspecified hyperlipidemia type  Assessment & Plan:  Continue pravastatin       6. Abdominal aortic aneurysm (AAA) without rupture, unspecified part  Assessment & Plan:  Stable per Ct abdomen and pelvis done in 01/2024                Follow up: Follow up in about 2 months (around 11/19/2024) for wellness, Labs check (lipid panel).

## 2024-11-07 ENCOUNTER — HOSPITAL ENCOUNTER (OUTPATIENT)
Dept: RADIOLOGY | Facility: HOSPITAL | Age: 84
Discharge: HOME | End: 2024-11-07
Payer: MEDICARE

## 2024-11-07 DIAGNOSIS — R05.9 COUGH: ICD-10-CM

## 2024-11-07 PROCEDURE — 71046 X-RAY EXAM CHEST 2 VIEWS: CPT | Mod: TC

## 2024-11-19 ENCOUNTER — TELEPHONE (OUTPATIENT)
Dept: INTERNAL MEDICINE | Facility: CLINIC | Age: 84
End: 2024-11-19
Payer: MEDICARE

## 2024-11-19 DIAGNOSIS — E83.52 HYPERCALCEMIA: ICD-10-CM

## 2024-11-19 DIAGNOSIS — R73.03 PREDIABETES: ICD-10-CM

## 2024-11-19 DIAGNOSIS — I71.40 ABDOMINAL AORTIC ANEURYSM (AAA) WITHOUT RUPTURE, UNSPECIFIED PART: ICD-10-CM

## 2024-11-19 DIAGNOSIS — I10 PRIMARY HYPERTENSION: ICD-10-CM

## 2024-11-19 DIAGNOSIS — E78.5 HYPERLIPIDEMIA, UNSPECIFIED HYPERLIPIDEMIA TYPE: ICD-10-CM

## 2024-11-19 DIAGNOSIS — E21.3 HYPERPARATHYROIDISM: ICD-10-CM

## 2024-11-19 DIAGNOSIS — I10 HYPERTENSION, UNSPECIFIED TYPE: ICD-10-CM

## 2024-11-19 DIAGNOSIS — T14.8XXA BRUISING: ICD-10-CM

## 2024-11-19 DIAGNOSIS — M81.0 OSTEOPOROSIS, UNSPECIFIED OSTEOPOROSIS TYPE, UNSPECIFIED PATHOLOGICAL FRACTURE PRESENCE: Primary | ICD-10-CM

## 2024-11-19 DIAGNOSIS — F03.90 DEMENTIA, UNSPECIFIED DEMENTIA SEVERITY, UNSPECIFIED DEMENTIA TYPE, UNSPECIFIED WHETHER BEHAVIORAL, PSYCHOTIC, OR MOOD DISTURBANCE OR ANXIETY: ICD-10-CM

## 2024-11-22 ENCOUNTER — LAB VISIT (OUTPATIENT)
Dept: LAB | Facility: HOSPITAL | Age: 84
End: 2024-11-22
Attending: STUDENT IN AN ORGANIZED HEALTH CARE EDUCATION/TRAINING PROGRAM
Payer: MEDICARE

## 2024-11-22 DIAGNOSIS — E83.52 HYPERCALCEMIA: ICD-10-CM

## 2024-11-22 DIAGNOSIS — T14.8XXA BRUISING: ICD-10-CM

## 2024-11-22 DIAGNOSIS — I71.40 ABDOMINAL AORTIC ANEURYSM (AAA) WITHOUT RUPTURE, UNSPECIFIED PART: ICD-10-CM

## 2024-11-22 DIAGNOSIS — M81.0 OSTEOPOROSIS, UNSPECIFIED OSTEOPOROSIS TYPE, UNSPECIFIED PATHOLOGICAL FRACTURE PRESENCE: ICD-10-CM

## 2024-11-22 DIAGNOSIS — I10 PRIMARY HYPERTENSION: ICD-10-CM

## 2024-11-22 DIAGNOSIS — E78.5 HYPERLIPIDEMIA, UNSPECIFIED HYPERLIPIDEMIA TYPE: ICD-10-CM

## 2024-11-22 DIAGNOSIS — F03.90 DEMENTIA, UNSPECIFIED DEMENTIA SEVERITY, UNSPECIFIED DEMENTIA TYPE, UNSPECIFIED WHETHER BEHAVIORAL, PSYCHOTIC, OR MOOD DISTURBANCE OR ANXIETY: ICD-10-CM

## 2024-11-22 DIAGNOSIS — E21.3 HYPERPARATHYROIDISM: ICD-10-CM

## 2024-11-22 DIAGNOSIS — R73.03 PREDIABETES: ICD-10-CM

## 2024-11-22 DIAGNOSIS — I10 HYPERTENSION, UNSPECIFIED TYPE: ICD-10-CM

## 2024-11-22 LAB
25(OH)D3+25(OH)D2 SERPL-MCNC: 28 NG/ML (ref 30–80)
ALBUMIN SERPL-MCNC: 3.7 G/DL (ref 3.4–4.8)
ALBUMIN/GLOB SERPL: 1.1 RATIO (ref 1.1–2)
ALP SERPL-CCNC: 99 UNIT/L (ref 40–150)
ALT SERPL-CCNC: 11 UNIT/L (ref 0–55)
ANION GAP SERPL CALC-SCNC: 5 MEQ/L
AST SERPL-CCNC: 17 UNIT/L (ref 5–34)
BASOPHILS # BLD AUTO: 0.04 X10(3)/MCL
BASOPHILS NFR BLD AUTO: 0.4 %
BILIRUB SERPL-MCNC: 0.5 MG/DL
BUN SERPL-MCNC: 12.4 MG/DL (ref 9.8–20.1)
CALCIUM SERPL-MCNC: 8.7 MG/DL (ref 8.4–10.2)
CHLORIDE SERPL-SCNC: 113 MMOL/L (ref 98–107)
CHOLEST SERPL-MCNC: 180 MG/DL
CHOLEST/HDLC SERPL: 4 {RATIO} (ref 0–5)
CO2 SERPL-SCNC: 24 MMOL/L (ref 23–31)
CREAT SERPL-MCNC: 0.7 MG/DL (ref 0.55–1.02)
CREAT/UREA NIT SERPL: 18
EOSINOPHIL # BLD AUTO: 0.16 X10(3)/MCL (ref 0–0.9)
EOSINOPHIL NFR BLD AUTO: 1.7 %
ERYTHROCYTE [DISTWIDTH] IN BLOOD BY AUTOMATED COUNT: 13.2 % (ref 11.5–17)
EST. AVERAGE GLUCOSE BLD GHB EST-MCNC: 111.2 MG/DL
GFR SERPLBLD CREATININE-BSD FMLA CKD-EPI: >60 ML/MIN/1.73/M2
GLOBULIN SER-MCNC: 3.4 GM/DL (ref 2.4–3.5)
GLUCOSE SERPL-MCNC: 101 MG/DL (ref 82–115)
HBA1C MFR BLD: 5.5 %
HCT VFR BLD AUTO: 42.8 % (ref 37–47)
HDLC SERPL-MCNC: 44 MG/DL (ref 35–60)
HGB BLD-MCNC: 14.5 G/DL (ref 12–16)
IMM GRANULOCYTES # BLD AUTO: 0.06 X10(3)/MCL (ref 0–0.04)
IMM GRANULOCYTES NFR BLD AUTO: 0.6 %
LDLC SERPL CALC-MCNC: 110 MG/DL (ref 50–140)
LYMPHOCYTES # BLD AUTO: 2.48 X10(3)/MCL (ref 0.6–4.6)
LYMPHOCYTES NFR BLD AUTO: 26.6 %
MCH RBC QN AUTO: 33 PG (ref 27–31)
MCHC RBC AUTO-ENTMCNC: 33.9 G/DL (ref 33–36)
MCV RBC AUTO: 97.3 FL (ref 80–94)
MONOCYTES # BLD AUTO: 0.89 X10(3)/MCL (ref 0.1–1.3)
MONOCYTES NFR BLD AUTO: 9.6 %
NEUTROPHILS # BLD AUTO: 5.68 X10(3)/MCL (ref 2.1–9.2)
NEUTROPHILS NFR BLD AUTO: 61.1 %
NRBC BLD AUTO-RTO: 0 %
PLATELET # BLD AUTO: 232 X10(3)/MCL (ref 130–400)
PMV BLD AUTO: 12.9 FL (ref 7.4–10.4)
POTASSIUM SERPL-SCNC: 3.7 MMOL/L (ref 3.5–5.1)
PROT SERPL-MCNC: 7.1 GM/DL (ref 5.8–7.6)
RBC # BLD AUTO: 4.4 X10(6)/MCL (ref 4.2–5.4)
SODIUM SERPL-SCNC: 142 MMOL/L (ref 136–145)
TRIGL SERPL-MCNC: 128 MG/DL (ref 37–140)
TSH SERPL-ACNC: 0.99 UIU/ML (ref 0.35–4.94)
VLDLC SERPL CALC-MCNC: 26 MG/DL
WBC # BLD AUTO: 9.31 X10(3)/MCL (ref 4.5–11.5)

## 2024-11-22 PROCEDURE — 85025 COMPLETE CBC W/AUTO DIFF WBC: CPT

## 2024-11-22 PROCEDURE — 83036 HEMOGLOBIN GLYCOSYLATED A1C: CPT

## 2024-11-22 PROCEDURE — 36415 COLL VENOUS BLD VENIPUNCTURE: CPT

## 2024-11-22 PROCEDURE — 84443 ASSAY THYROID STIM HORMONE: CPT

## 2024-11-22 PROCEDURE — 82306 VITAMIN D 25 HYDROXY: CPT

## 2024-11-22 PROCEDURE — 80053 COMPREHEN METABOLIC PANEL: CPT

## 2024-11-22 PROCEDURE — 80061 LIPID PANEL: CPT

## 2024-11-26 ENCOUNTER — OFFICE VISIT (OUTPATIENT)
Dept: INTERNAL MEDICINE | Facility: CLINIC | Age: 84
End: 2024-11-26
Payer: MEDICARE

## 2024-11-26 VITALS
SYSTOLIC BLOOD PRESSURE: 112 MMHG | BODY MASS INDEX: 23.78 KG/M2 | TEMPERATURE: 98 F | OXYGEN SATURATION: 94 % | HEART RATE: 73 BPM | WEIGHT: 148 LBS | DIASTOLIC BLOOD PRESSURE: 66 MMHG | HEIGHT: 66 IN

## 2024-11-26 DIAGNOSIS — I10 PRIMARY HYPERTENSION: ICD-10-CM

## 2024-11-26 DIAGNOSIS — Z00.00 MEDICARE ANNUAL WELLNESS VISIT, SUBSEQUENT: ICD-10-CM

## 2024-11-26 DIAGNOSIS — M81.0 OSTEOPOROSIS, UNSPECIFIED OSTEOPOROSIS TYPE, UNSPECIFIED PATHOLOGICAL FRACTURE PRESENCE: Primary | ICD-10-CM

## 2024-11-26 DIAGNOSIS — E78.5 HYPERLIPIDEMIA, UNSPECIFIED HYPERLIPIDEMIA TYPE: ICD-10-CM

## 2024-11-26 DIAGNOSIS — F03.90 DEMENTIA, UNSPECIFIED DEMENTIA SEVERITY, UNSPECIFIED DEMENTIA TYPE, UNSPECIFIED WHETHER BEHAVIORAL, PSYCHOTIC, OR MOOD DISTURBANCE OR ANXIETY: ICD-10-CM

## 2024-11-26 PROCEDURE — 1126F AMNT PAIN NOTED NONE PRSNT: CPT | Mod: CPTII,,, | Performed by: STUDENT IN AN ORGANIZED HEALTH CARE EDUCATION/TRAINING PROGRAM

## 2024-11-26 PROCEDURE — 1101F PT FALLS ASSESS-DOCD LE1/YR: CPT | Mod: CPTII,,, | Performed by: STUDENT IN AN ORGANIZED HEALTH CARE EDUCATION/TRAINING PROGRAM

## 2024-11-26 PROCEDURE — 3074F SYST BP LT 130 MM HG: CPT | Mod: CPTII,,, | Performed by: STUDENT IN AN ORGANIZED HEALTH CARE EDUCATION/TRAINING PROGRAM

## 2024-11-26 PROCEDURE — 3288F FALL RISK ASSESSMENT DOCD: CPT | Mod: CPTII,,, | Performed by: STUDENT IN AN ORGANIZED HEALTH CARE EDUCATION/TRAINING PROGRAM

## 2024-11-26 PROCEDURE — 3078F DIAST BP <80 MM HG: CPT | Mod: CPTII,,, | Performed by: STUDENT IN AN ORGANIZED HEALTH CARE EDUCATION/TRAINING PROGRAM

## 2024-11-26 PROCEDURE — G0439 PPPS, SUBSEQ VISIT: HCPCS | Mod: ,,, | Performed by: STUDENT IN AN ORGANIZED HEALTH CARE EDUCATION/TRAINING PROGRAM

## 2024-12-10 ENCOUNTER — OFFICE VISIT (OUTPATIENT)
Dept: NEUROLOGY | Facility: CLINIC | Age: 84
End: 2024-12-10
Payer: MEDICARE

## 2024-12-10 VITALS
BODY MASS INDEX: 23.78 KG/M2 | WEIGHT: 148 LBS | DIASTOLIC BLOOD PRESSURE: 62 MMHG | SYSTOLIC BLOOD PRESSURE: 104 MMHG | HEIGHT: 66 IN

## 2024-12-10 DIAGNOSIS — F03.90 DEMENTIA, UNSPECIFIED DEMENTIA SEVERITY, UNSPECIFIED DEMENTIA TYPE, UNSPECIFIED WHETHER BEHAVIORAL, PSYCHOTIC, OR MOOD DISTURBANCE OR ANXIETY: ICD-10-CM

## 2024-12-10 PROCEDURE — 3074F SYST BP LT 130 MM HG: CPT | Mod: CPTII,S$GLB,, | Performed by: SPECIALIST

## 2024-12-10 PROCEDURE — 99999 PR PBB SHADOW E&M-EST. PATIENT-LVL III: CPT | Mod: PBBFAC,,, | Performed by: SPECIALIST

## 2024-12-10 PROCEDURE — 1159F MED LIST DOCD IN RCRD: CPT | Mod: CPTII,S$GLB,, | Performed by: SPECIALIST

## 2024-12-10 PROCEDURE — 1101F PT FALLS ASSESS-DOCD LE1/YR: CPT | Mod: CPTII,S$GLB,, | Performed by: SPECIALIST

## 2024-12-10 PROCEDURE — 99205 OFFICE O/P NEW HI 60 MIN: CPT | Mod: S$GLB,,, | Performed by: SPECIALIST

## 2024-12-10 PROCEDURE — 3288F FALL RISK ASSESSMENT DOCD: CPT | Mod: CPTII,S$GLB,, | Performed by: SPECIALIST

## 2024-12-10 PROCEDURE — 3078F DIAST BP <80 MM HG: CPT | Mod: CPTII,S$GLB,, | Performed by: SPECIALIST

## 2024-12-10 NOTE — PROGRESS NOTES
"Subjective:      @Patient ID: Guillermina Stewart is a 84 y.o. female.    Chief Complaint/referral reason/HPI:   Chief Complaint   Patient presents with    Dementia     Pt here w daughter fernandez and son la ref by Jesenia Yoo. Pt son states that she has no memory of names, but can recognize faces. Pt forgets tasks in the middle of doing them. Pt often misplaces items and can't remember whereabouts. Pt often forgets where she is and "goes into a daze" Pt has frequent jeovanny changes of irritability. Pt unable to bathe, but can dress herself, feed herself. Pt unable to do cognitive tasks such as puzzles, crosswords, etc. Unable to complete MMSE.     9.2024   [id number]          0.943   (pos greater than 0.325)    [place] PASTOR hx comments:  Most nights ok per son La '[place] [employer]'   she had been rx'ed something in past but would sleep too late and they worried about her fall risk     Family also says she has gi or stool incont but alternates with constipation   getting senna     See also 'facesheet' under media for handwritten notes     Review of Systems         Marital status: w    Does not drive     -------------------------------------    HLD (hyperlipidemia)    HTN (hypertension)    Memory loss    Osteoporosis     ..  Current Outpatient Medications   Medication Instructions    acetaminophen (TYLENOL) 160 mg, Every 4 hours PRN    aspirin (ECOTRIN) 81 mg, Oral, Daily    cetirizine (ZYRTEC) 10 mg, Daily    lisinopriL 10 mg, Oral, Daily    memantine (NAMENDA XR) 28 mg, Oral, Daily    pravastatin (PRAVACHOL) 40 mg, Oral, Daily    PROLIA 60 mg/mL Syrg Inject into skin of abdomen, upper arm, or upper thigh every 6 months    senna-docusate 8.6-50 mg (SENNA WITH DOCUSATE SODIUM) 8.6-50 mg per tablet 1 tablet, Oral, Daily    vitamin D (VITAMIN D3) 1,000 Units, Daily      Objective:      Exam:   Visit Vitals  /62 (BP Location: Left arm, Patient Position: Sitting)   Ht 5' 6" (1.676 m)   Wt 67.1 kg (148 lb)   BMI 23.89 kg/m² " "    General Exam  If Accompanied, by__       body habitus_ Body mass index is 23.89 kg/m².  Gen exam     Neurological:  Exam comments:  CN's: VF EOM's ok   Speech: aphasic   cannot name her children   quickly said "two" for VF confrontation but could not say "one" but indicated with her fingers   Motor: moves all extr's well   Coord:  Reflexes:  Plantars:  Sensation:   Gait: walks well  and wanted to dance for me       Neuroimaging: Images and imaging reports reviewed.   Rads summary:Mod atrophy with periventr, subcortical wm changes most compatible with chronic small vessel ischemic ds.   My comments: asymm atrophy bifrontal but L hemisp perisylv atrophy more than R   Ct 5.2023      Labs:    New Patient             Complexity of Data:   High    Risks:  High     MDM:    High         Assessment/Plan:         ICD-10-CM ICD-9-CM   1. Dementia, unspecified dementia severity, unspecified dementia type, unspecified whether behavioral, psychotic, or mood disturbance or anxiety  F03.90 294.20         Other Comments / Follow Up:          Discussed that I do not recommend donepezil here   I also explained that I do not recommend anti amyloid ab's at present (IV meds)   They asked about dementia vs alz's ds and we discussed terminology   She could even have FTD and AD pathology   Bottom line I do not want to put her in harms way with meds that are unlikely to help   Palliative care discussed   DNR suggested     They can return here if they were to call       Eugene Jenkins MD CONNER FAAN, [facility]  [facility] Medical Director   [facility]     "

## 2024-12-21 PROBLEM — Z00.00 MEDICARE ANNUAL WELLNESS VISIT, SUBSEQUENT: Status: ACTIVE | Noted: 2024-12-21

## 2024-12-21 NOTE — PROGRESS NOTES
Subjective:      Guillermina Stewart  12/21/2024  [id number]    Tereza Lao MD  Patient Care Team:  Tereza Lao MD as PCP - General (Internal Medicine)  Will Edward MD as Consulting Physician (Endocrinology)          Visit Type:a scheduled routine follow-up visit    Chief Complaint: Medicare AWV Follow Up    HPI  Ms Sarmiento presents for Medicare wellness visit. Patient does not have new complaints since last visit.     Past Medical History:   Diagnosis Date    HLD (hyperlipidemia)     HTN (hypertension)     Memory loss     Osteoporosis      Past Surgical History:   Procedure Laterality Date    EYE SURGERY      hemorroidectomy      HYSTERECTOMY       Family History   Problem Relation Name Age of Onset    Anxiety disorder Father Tiago Langford      Social History     Tobacco Use    Smoking status: Every Day     Current packs/day: 1.00     Average packs/day: 1 pack/day for 60.0 years (60.0 ttl pk-yrs)     Types: Cigarettes     Passive exposure: Current    Smokeless tobacco: Never    Tobacco comments:     Not ready to quit   Substance and Sexual Activity    Alcohol use: Never    Drug use: Never    Sexual activity: Not Currently     Partners: Female     Active Problem List with Overview Notes    Diagnosis Date Noted    Medicare annual wellness visit, subsequent 12/21/2024    Abdominal aortic aneurysm (AAA) without rupture, unspecified part 02/29/2024    Recurrent UTI 07/20/2023    Dementia 12/01/2022    Hyperparathyroidism 06/23/2022    Forgetfulness 06/02/2022    Hypercalcemia 06/02/2022    Hyperlipidemia 06/02/2022    Hypertension 06/02/2022    Osteoporosis 06/02/2022    Tobacco user 06/02/2022     Review of patient's allergies indicates:   Allergen Reactions    Propoxyphene     Tramadol Nausea And Vomiting       The following were reviewed at this visit: active problem list, medication list, allergies, family history, social history, and health maintenance.    Medications:    Current Outpatient  Medications:     acetaminophen (TYLENOL) 160 MG Chew, Take 160 mg by mouth every 4 (four) hours as needed., Disp: , Rfl:     aspirin (ECOTRIN) 81 MG EC tablet, Take 1 tablet (81 mg total) by mouth once daily., Disp: , Rfl:     cetirizine (ZYRTEC) 10 MG tablet, Take 10 mg by mouth once daily., Disp: , Rfl:     lisinopriL 10 MG tablet, Take 1 tablet (10 mg total) by mouth once daily., Disp: 90 tablet, Rfl: 3    memantine (NAMENDA XR) 28 mg CSpX, Take 1 capsule (28 mg total) by mouth once daily., Disp: 30 capsule, Rfl: 11    pravastatin (PRAVACHOL) 40 MG tablet, Take 1 tablet (40 mg total) by mouth once daily., Disp: 90 tablet, Rfl: 1    PROLIA 60 mg/mL Syrg, Inject into skin of abdomen, upper arm, or upper thigh every 6 months, Disp: 1 each, Rfl: 0    senna-docusate 8.6-50 mg (SENNA WITH DOCUSATE SODIUM) 8.6-50 mg per tablet, Take 1 tablet by mouth once daily., Disp: 30 tablet, Rfl: 11    vitamin D (VITAMIN D3) 1000 units Tab, Take 1,000 Units by mouth once daily., Disp: , Rfl:       Medications have been reviewed and reconciled with patient at this visit.  Barriers to medications reviewed with patient.    Adverse reactions to current medications reviewed with patient..    Over the counter medications reviewed and reconciled with patient.    Opioid Screening: Patient medication list reviewed, patient is not taking prescription opioids. Patient is not using additional opioids than prescribed. Patient is at low risk of substance abuse based on this opioid use history.     Review of Systems   Constitutional:  Negative for chills, fever, malaise/fatigue and weight loss.   HENT:  Negative for congestion, ear discharge, ear pain, hearing loss, sinus pain and sore throat.    Eyes:  Negative for photophobia, pain, discharge and redness.   Respiratory:  Negative for cough, shortness of breath and wheezing.    Cardiovascular:  Negative for chest pain, palpitations and leg swelling.   Gastrointestinal:  Negative for  "constipation, diarrhea, heartburn, nausea and vomiting.   Genitourinary:  Negative for dysuria, frequency and urgency.   Musculoskeletal:  Negative for falls, joint pain and myalgias.   Skin:  Negative for itching and rash.   Neurological:  Negative for dizziness, focal weakness, weakness and headaches.   Psychiatric/Behavioral:  Negative for depression and memory loss. The patient is not nervous/anxious and does not have insomnia.                   Objective:      Vitals:    11/26/24 1000   BP: 112/66   BP Location: Right arm   Patient Position: Sitting   Pulse: 73   Temp: 97.7 °F (36.5 °C)   SpO2: (!) 94%   Weight: 67.1 kg (148 lb)   Height: 5' 6" (1.676 m)       Physical Exam  Constitutional:       General: She is not in acute distress.     Appearance: Normal appearance.   HENT:      Head: Normocephalic and atraumatic.   Eyes:      Extraocular Movements: Extraocular movements intact.      Pupils: Pupils are equal, round, and reactive to light.   Cardiovascular:      Rate and Rhythm: Normal rate and regular rhythm.      Pulses: Normal pulses.      Heart sounds: Normal heart sounds.   Pulmonary:      Effort: Pulmonary effort is normal.      Breath sounds: Normal breath sounds.   Abdominal:      General: Abdomen is flat. Bowel sounds are normal. There is no distension.      Palpations: Abdomen is soft.      Tenderness: There is no abdominal tenderness.   Musculoskeletal:      Cervical back: Normal range of motion and neck supple.      Right lower leg: No edema.      Left lower leg: No edema.   Skin:     Findings: No lesion or rash.   Neurological:      General: No focal deficit present.      Mental Status: She is alert and oriented to person, place, and time.           A comprehensive HEALTH RISK ASSESSMENT was completed today. Results are summarized below:    There are NO EMOTIONAL/SOCIAL CONCERNS identified on today's screening for Social Isolation, Depression and Anxiety.    There are NO COGNITIVE FUNCTION " CONCERNS identified on today's screening.  The following FUNCTIONAL AND/OR SAFETY CONCERNS were identified on today's screening for Physical Symptoms, Nutritional, Home Safety/Living Situation, Fall Risk, Activities of Daily Living, Independent Activities of Daily Living, Physical Activity,Timed Up and Go test and Whisper test::   *Patient reports she NEEDS ASSISTANCE WITH SOME ACTIVITIES OF DAILY LIVING. ( )  *Patient reports she NEEDS ASSISTANCE WITH SOME INDEPENDENT ACTIVITIES OF DAILY LIVING. ( )     The patient reports NO OPIOID PRESCRIPTIONS. This was confirmed through medication reconciliation and the [facility] website.    The patient is A CURRENT TOBACCO USER.  Tobacco Use: High Risk (12/10/2024)    Patient History     Smoking Tobacco Use: Every Day     Smokeless Tobacco Use: Never     Passive Exposure: Current     The patient reports NO SIGNIFICANT ALCOHOL USE.     All Questions regarding food, transportation or housing were not answered today.        Laboratory Reviewed ({Yes)  Lab Results   Component Value Date    WBC 9.31 11/22/2024    HGB 14.5 11/22/2024    HCT 42.8 11/22/2024     11/22/2024    CHOL 180 11/22/2024    TRIG 128 11/22/2024    HDL 44 11/22/2024    ALT 11 11/22/2024    AST 17 11/22/2024     11/22/2024    K 3.7 11/22/2024     (H) 11/22/2024    CREATININE 0.70 11/22/2024    BUN 12.4 11/22/2024    CO2 24 11/22/2024    TSH 0.988 11/22/2024    HGBA1C 5.5 11/22/2024         Assessment and Plan:       Problem List Items Addressed This Visit          Neuro    Dementia     Positive tau protein in serum  Has been referred to Neurology             Cardiac/Vascular    Hyperlipidemia     Continue pravastatin          Hypertension     Controlled  Continue lisinopril             Endocrine    Osteoporosis - Primary     On Prolia injections  Last DXA done 04/2024             Other    Medicare annual wellness visit, subsequent     DXA: done 04/2024  Labs: done and reviewed in office                Care Plan/Goals: Reviewed    Goals    None         Follow up: Follow up in about 1 year (around 11/26/2025) for wellness, Labs check.    No orders of the defined types were placed in this encounter.      Medicare Annual Wellness and Personalized Prevention Plan:   Fall Risk + Home Safety + Hearing Impairment + Depression Screen + Cognitive Impairment Screen + Health Risk Assessment all reviewed.     Health Maintenance Topics with due status: Not Due       Topic Last Completion Date    DEXA Scan 04/25/2024    Lipid Panel 11/22/2024      The patient's Health Maintenance was reviewed and the following appears to be due at this time:   Health Maintenance Due   Topic Date Due    Influenza Vaccine (1) 09/01/2024    COVID-19 Vaccine (1 - 2024-25 season) Never done       Advance Care Planning   I attest to discussing Advance Care Planning with patient and/or family member.  Education was provided including the importance of the Health Care Power of , Advance Directives, and/or [place] documentation.  The patient expressed understanding to the importance of this information and discussion.

## 2024-12-21 NOTE — ASSESSMENT & PLAN NOTE
DXA: done 04/2024  Labs: done and reviewed in office    Occlusion: No Photosensitizer: Levulan Arms Incubation Time: 2 Hours Detail Level: Zone Face, Ears And  Scalp Incubation Time: 1 Hour Scalp Incubation Time: 0 min Consent: The procedure and risks were reviewed with the patient including but not limited to: burning, pigmentary changes, pain, blistering, scabbing, redness, and the possibility of needing numerous treatments. Strict photoprotection after the procedure was also discussed. Frequency Of Pdt: Single Treatment Pdt Type: NATHAN-U Face And Ears Incubation Time: 0 Location: Face and Ears

## 2025-01-10 ENCOUNTER — HOSPITAL ENCOUNTER (EMERGENCY)
Facility: HOSPITAL | Age: 85
Discharge: HOME | End: 2025-01-10
Attending: EMERGENCY MEDICINE
Payer: MEDICARE

## 2025-01-10 VITALS
RESPIRATION RATE: 15 BRPM | SYSTOLIC BLOOD PRESSURE: 144 MMHG | TEMPERATURE: 96 F | OXYGEN SATURATION: 98 % | HEART RATE: 66 BPM | DIASTOLIC BLOOD PRESSURE: 81 MMHG

## 2025-01-10 DIAGNOSIS — M54.50 ACUTE MIDLINE LOW BACK PAIN WITHOUT SCIATICA: Primary | ICD-10-CM

## 2025-01-10 LAB
BACTERIA #/AREA URNS AUTO: ABNORMAL /HPF
BILIRUB UR QL STRIP.AUTO: NEGATIVE
CLARITY UR: CLEAR
COLOR UR AUTO: YELLOW
GLUCOSE UR QL STRIP: NORMAL
HGB UR QL STRIP: NEGATIVE
KETONES UR QL STRIP: NEGATIVE
LEUKOCYTE ESTERASE UR QL STRIP: 25
MUCOUS THREADS URNS QL MICRO: ABNORMAL /LPF
NITRITE UR QL STRIP: NEGATIVE
PH UR STRIP: 6.5 [PH]
PROT UR QL STRIP: ABNORMAL
RBC #/AREA URNS AUTO: ABNORMAL /HPF
SP GR UR STRIP.AUTO: 1.03 (ref 1–1.03)
SQUAMOUS #/AREA URNS LPF: ABNORMAL /HPF
UROBILINOGEN UR STRIP-ACNC: NORMAL
WBC #/AREA URNS AUTO: ABNORMAL /HPF

## 2025-01-10 PROCEDURE — 81001 URINALYSIS AUTO W/SCOPE: CPT

## 2025-01-10 PROCEDURE — 99283 EMERGENCY DEPT VISIT LOW MDM: CPT | Mod: 25

## 2025-01-10 PROCEDURE — 25000003 PHARM REV CODE 250

## 2025-01-10 RX ORDER — TIZANIDINE 4 MG/1
4 TABLET ORAL
Status: COMPLETED | OUTPATIENT
Start: 2025-01-10 | End: 2025-01-10

## 2025-01-10 RX ORDER — TIZANIDINE 4 MG/1
4 TABLET ORAL EVERY 12 HOURS PRN
Qty: 12 TABLET | Refills: 0 | Status: SHIPPED | OUTPATIENT
Start: 2025-01-10

## 2025-01-10 RX ORDER — ACETAMINOPHEN 500 MG
1000 TABLET ORAL
Status: COMPLETED | OUTPATIENT
Start: 2025-01-10 | End: 2025-01-10

## 2025-01-10 RX ADMIN — TIZANIDINE 4 MG: 4 TABLET ORAL at 01:01

## 2025-01-10 RX ADMIN — ACETAMINOPHEN 1000 MG: 500 TABLET ORAL at 01:01

## 2025-01-10 NOTE — ED PROVIDER NOTES
Encounter Date: 1/10/2025       History     Chief Complaint   Patient presents with    Back Pain     Daughter reports lower back pain that started this morning. Denies injury/trauma & dysuria. Hx dementia      84 y.o. White female with a history of Dementia, Hypertension, and Hyperlipidemia presents to Emergency Department with a chief complaint of atraumatic back pain. Symptoms began today and have been constant since onset. Associated symptoms include myalgias. Symptoms are aggravated with palpation and there are no alleviating factors. Denies CP, SOB, recent injury, incontinence, numbness/tingling, or vomiting. No other reported symptoms at this time      The history is provided by the patient. No  was used.   Back Pain   This is a new problem. The current episode started today. The problem occurs throughout the day. The problem has been unchanged. The pain is associated with no known injury. The pain is present in the lumbar spine. The pain does not radiate. The pain is The same all the time. Stiffness is present All day. Pertinent negatives include no chest pain, no fever, no numbness, no abdominal pain, no abdominal swelling, no bowel incontinence, no perianal numbness, no bladder incontinence, no dysuria, no paresthesias, no paresis, no tingling and no weakness. She has tried nothing for the symptoms.     Review of patient's allergies indicates:   Allergen Reactions    Propoxyphene     Tramadol Nausea And Vomiting     Past Medical History:   Diagnosis Date    Dementia without behavioral disturbance     HLD (hyperlipidemia)     HTN (hypertension)     Memory loss     Osteoporosis      Past Surgical History:   Procedure Laterality Date    EYE SURGERY      hemorroidectomy      HYSTERECTOMY       Family History   Problem Relation Name Age of Onset    Anxiety disorder Father Tiago Anastasiya      Social History     Tobacco Use    Smoking status: Every Day     Current packs/day: 1.00     Average  packs/day: 1 pack/day for 60.0 years (60.0 ttl pk-yrs)     Types: Cigarettes     Passive exposure: Current    Smokeless tobacco: Never    Tobacco comments:     Not ready to quit   Substance Use Topics    Alcohol use: Never    Drug use: Never     Review of Systems   Constitutional:  Negative for diaphoresis, fatigue and fever.   Eyes:  Negative for photophobia and visual disturbance.   Respiratory:  Negative for cough, shortness of breath, wheezing and stridor.    Cardiovascular:  Negative for chest pain.   Gastrointestinal:  Negative for abdominal pain, bowel incontinence, nausea and vomiting.   Genitourinary:  Negative for bladder incontinence, dysuria, flank pain and frequency.   Musculoskeletal:  Positive for back pain and myalgias. Negative for gait problem and joint swelling.   Neurological:  Negative for dizziness, tingling, syncope, weakness, numbness and paresthesias.   All other systems reviewed and are negative.      Physical Exam     Initial Vitals [01/10/25 1021]   BP Pulse Resp Temp SpO2   124/81 73 18 96.2 °F (35.7 °C) 99 %      MAP       --         Physical Exam    Nursing note and vitals reviewed.  Constitutional: She appears well-developed and well-nourished. She is not diaphoretic. She is cooperative.  Non-toxic appearance. No distress.   HENT:   Head: Normocephalic and atraumatic.   Right Ear: External ear normal.   Left Ear: External ear normal.   Nose: Nose normal.   Eyes: Conjunctivae and EOM are normal. Pupils are equal, round, and reactive to light.   Neck: Neck supple.   Normal range of motion.  Cardiovascular:  Normal rate, regular rhythm, S1 normal, S2 normal, normal heart sounds, intact distal pulses and normal pulses.           Pulses:       Radial pulses are 2+ on the right side and 2+ on the left side.   Pulmonary/Chest: Effort normal and breath sounds normal. No tachypnea and no bradypnea. No respiratory distress. She has no decreased breath sounds. She has no wheezes. She has no  rhonchi. She has no rales. She exhibits no tenderness.   Abdominal: Abdomen is soft. Bowel sounds are normal. She exhibits no distension. There is no abdominal tenderness. There is no rebound.   Genitourinary:    Rectum normal.   Rectum:      No tenderness, internal hemorrhoid or abnormal anal tone.      Genitourinary Comments: Good rectal tone noted; exam performed with SONALI Hillman as chaperone. Patient tolerated well.      Musculoskeletal:         General: Tenderness present. Normal range of motion.      Cervical back: Normal, normal range of motion and neck supple.      Thoracic back: Normal.      Lumbar back: Tenderness present.        Back:       Comments: Tenderness noted to outlined area. No step offs or deformities noted. CMS intact. FROM noted. All other adjacent joints otherwise normal.        Neurological: She is alert. She has normal strength. She displays a negative Romberg sign. Coordination and gait normal. GCS eye subscore is 4. GCS verbal subscore is 4. GCS motor subscore is 6.   GCS 14, hx of dementia.    Skin: Skin is warm and dry. Capillary refill takes less than 2 seconds.   Psychiatric: She has a normal mood and affect. Thought content normal.         ED Course   Procedures  Labs Reviewed   URINALYSIS, REFLEX TO URINE CULTURE - Abnormal       Result Value    Color, UA Yellow      Appearance, UA Clear      Specific Gravity, UA 1.032 (*)     pH, UA 6.5      Protein, UA Trace (*)     Glucose, UA Normal      Ketones, UA Negative      Blood, UA Negative      Bilirubin, UA Negative      Urobilinogen, UA Normal      Nitrites, UA Negative      Leukocyte Esterase, UA 25 (*)     RBC, UA 0-5      WBC, UA 0-5      Bacteria, UA None Seen      Squamous Epithelial Cells, UA Trace      Mucous, UA Trace (*)           Imaging Results              X-Ray Lumbar Spine 2 Or 3 Views (Final result)  Result time 01/10/25 11:18:54      Final result by Adan Bell MD (01/10/25 11:18:54)                   Narrative:     EXAMINATION  XR LUMBAR SPINE 2 OR 3 VIEWS    CLINICAL HISTORY  low back pain;    TECHNIQUE  A total of 3 images submitted of the lumbosacral spine.    COMPARISON  13 November 2022    FINDINGS  Vertebral segments: There are 5 non rib-bearing lumbar-type vertebral bodies. No cortical displacement, acute compression deformity, or focal lesion. There are similar degenerative endplate and facet changes throughout the spinal column.  No evidence of traumatic subluxation. Multilevel intervertebral space narrowing is present, chronic and degenerative in appearance.    Curvature: Normal curvature is maintained.    Other findings: The partially visualized pelvic joint spaces are congruent and no focal irregularity of the bony pelvis is identified.  No acute or focal soft tissue abnormality.  Diffuse aortoiliac calcifications similar in comparison.  There is focal dilatation of the lower abdominal aorta measuring up to 4.5 cm in diameter, similar to the prior.    IMPRESSION  1. Similar advanced lumbar spine degenerative changes without evidence of acute abnormality.  2. Redemonstrated diffuse aortoiliac calcification with aneurysmal dilatation.      Electronically signed by: Adan Bell  Date:    01/10/2025  Time:    11:18                                     Medications   acetaminophen tablet 1,000 mg (1,000 mg Oral Given 1/10/25 1323)   tiZANidine tablet 4 mg (4 mg Oral Given 1/10/25 1324)     Medical Decision Making  Patient awake, alert, at baseline, and follows commands. Arrived to ED due to atraumatic lower back pain. Symptoms began today. Denies injury/trauma. Afebrile. NAD Noted.     Judging by the patient's chief complaint and pertinent history, the patient has the following possible differential diagnoses, including but not limited to the following: Lumbar Strain, Back Pain, UTI     Some of these are deemed to be lower likelihood and some more likely based on my physical exam and history combined with possible lab  work and/or imaging studies. Please see the pertinent studies, and refer to the HPI. Some of these diagnoses will take further evaluation to fully rule out, perhaps as an outpatient and the patient was encouraged to follow up when discharged for more comprehensive evaluation.       Amount and/or Complexity of Data Reviewed  Independent Historian: caregiver     Details: Patient's daughter at bedside providing history and reason for visit.   External Data Reviewed: radiology.     Details: Previous imaging revealed: There is an abdominal aortic aneurysm seen that measures 3.7 x 4.5 x 4.3 cm.  It is similar to the prior examination.  Labs: ordered.     Details: UA- unremarkable. Informed patient and family of results.   Radiology: ordered. Decision-making details documented in ED Course.     Details: Informed patient and family of results.   Discussion of management or test interpretation with external provider(s): Patient's symptoms likely related advanced lumbar changes. UA unremarkable. Due to hx of aortic aneurysm, although no changes based on imaging, offered additional imaging, patient and family declined. Patient has no stroke like symptoms, increased confusion, CP, SOB, or abdominal pain, dissection unlikely. Since symptoms improved after med admin, prescribed meds at home. Cautioned family heavily on side effects. Educated on when to return to ER. Discussed plan of care and interventions with patient and family. Agreed to and aware of plan of care. Comfortable being discharged home. Patient discharged home. Patient denies new or additional complaints; no further tests indicated at this time. Patient and family verbalized understanding of instructions. No emergent or apparent distress noted prior to discharge. To follow up with PCP in 1 week as needed. Strict ER return precautions given.       Risk  OTC drugs.  Prescription drug management.               ED Course as of 01/10/25 1433   Fri Antonio 10, 2025   0740  "X-Ray Lumbar Spine 2 Or 3 Views  1. Similar advanced lumbar spine degenerative changes without evidence of acute abnormality.  2. Redemonstrated diffuse aortoiliac calcification with aneurysmal dilatation.   [JA]   1414 Patient reports improvement in symptoms after med admin. Ambulatory without difficulty. Discussed results with patient and family. States due to dementia patient has been getting in the car "weird" ways and possibly could have strained a muscle. Offered additional imaging, patient and family declined, states they are ready to be discharged. Will discharge home with short course of muscle relaxer agent and to f/u with PCP. Cautioned on side effects. At disposition, pain improved, ambulatory, has no step offs or deformities, has no red flags, and has outpatient f/u.  [JA]      ED Course User Index  [JA] Buffy Rico NP                           Clinical Impression:  Final diagnoses:  [M54.50] Acute midline low back pain without sciatica (Primary)          ED Disposition Condition    Discharge Stable          ED Prescriptions       Medication Sig Dispense Start Date End Date Auth. Provider    tiZANidine (ZANAFLEX) 4 MG tablet Take 1 tablet (4 mg total) by mouth every 12 (twelve) hours as needed. 12 tablet 1/10/2025 -- Buffy Rico, NP          Follow-up Information       Follow up With Specialties Details Why Contact Info    Tereza Lao MD Internal Medicine Call on 1/13/2025 to schedule an appointment. [address] [address]  [phone]      [facility] - Emergency Dept Emergency Medicine Go to  If symptoms worsen, As needed [address] [facility] [place] [id number] [phone]             Buffy Rico NP  01/10/25 1448    "

## 2025-01-10 NOTE — DISCHARGE INSTRUCTIONS
Thanks for letting us take care of you today!  It is our goal to give you courteous care and to keep you comfortable and informed, if you have any questions before you leave I will be happy to try and answer them.    Here is some advice after your visit:      Your visit in the emergency department is NOT definitive care - please follow-up with your primary care doctor and/or specialist within 1 week.  Please return if you have any worsening in your condition or if you have any other concerns.    If you had radiology exams like an XRAY or CT in the emergency Department the interpreation on them may be preliminary - there may be less time sensitive findings on the reports please obtain these reports within 24 hours from the hospital or by using your out on your mobile phone to access records.  Bring these to your primary care doctor and/or specialist for further review of incidental findings.    Please review any LAB WORK from your visit today with your primary care physician.    You have been prescribed Tizanidine for muscle spasms/pain. Please do not take this medication while working, drinking alcohol, swimming, or while driving/operating heavy machinery. This medication may cause drowsiness, dizziness, impair judgment, and reduce physical capabilities.You should not drive, operate heavy machinery, or make life changing decisions while taking this medication.      While in the Emergency Department you received medication that may cause drowsiness, dizziness, impaired judgment, and reduced physical capabilities. You should not drive, operate heavy machinery, swim, or make life  changing decisions within 24 hours of receiving this medication.

## 2025-01-10 NOTE — FIRST PROVIDER EVALUATION
Medical screening examination initiated.  I have conducted a focused provider triage encounter, findings are as follows:    Brief history of present illness:  84 year old female presents to ER with c/o lower back pain onset this morning. Denies fall. Patient has history of dementia    Vitals:    01/10/25 1021   BP: 124/81   Pulse: 73   Resp: 18   Temp: 96.2 °F (35.7 °C)   TempSrc: Temporal   SpO2: 99%       Pertinent physical exam:  awake and alert, nad    Brief workup plan:  imaging, ua     Preliminary workup initiated; this workup will be continued and followed by the physician or advanced practice provider that is assigned to the patient when roomed.

## 2025-01-25 ENCOUNTER — PATIENT MESSAGE (OUTPATIENT)
Dept: INTERNAL MEDICINE | Facility: CLINIC | Age: 85
End: 2025-01-25
Payer: MEDICARE

## 2025-01-27 DIAGNOSIS — E78.5 HYPERLIPIDEMIA, UNSPECIFIED HYPERLIPIDEMIA TYPE: ICD-10-CM

## 2025-01-27 RX ORDER — PRAVASTATIN SODIUM 40 MG/1
40 TABLET ORAL DAILY
Qty: 90 TABLET | Refills: 1 | Status: SHIPPED | OUTPATIENT
Start: 2025-01-27

## 2025-04-24 RX ORDER — LISINOPRIL 10 MG/1
10 TABLET ORAL DAILY
Qty: 90 TABLET | Refills: 3 | OUTPATIENT
Start: 2025-04-24 | End: 2026-04-24

## 2025-05-23 ENCOUNTER — PATIENT MESSAGE (OUTPATIENT)
Dept: INTERNAL MEDICINE | Facility: CLINIC | Age: 85
End: 2025-05-23
Payer: MEDICARE

## 2025-05-23 DIAGNOSIS — I10 PRIMARY HYPERTENSION: Primary | ICD-10-CM

## 2025-05-23 DIAGNOSIS — E78.5 HYPERLIPIDEMIA, UNSPECIFIED HYPERLIPIDEMIA TYPE: ICD-10-CM

## 2025-05-26 RX ORDER — PRAVASTATIN SODIUM 40 MG/1
40 TABLET ORAL DAILY
Qty: 90 TABLET | Refills: 1 | Status: SHIPPED | OUTPATIENT
Start: 2025-05-26

## 2025-05-26 RX ORDER — LISINOPRIL 10 MG/1
10 TABLET ORAL DAILY
Qty: 90 TABLET | Refills: 3 | Status: SHIPPED | OUTPATIENT
Start: 2025-05-26 | End: 2026-05-26